# Patient Record
Sex: FEMALE | Race: WHITE | NOT HISPANIC OR LATINO | Employment: OTHER | URBAN - METROPOLITAN AREA
[De-identification: names, ages, dates, MRNs, and addresses within clinical notes are randomized per-mention and may not be internally consistent; named-entity substitution may affect disease eponyms.]

---

## 2016-06-28 LAB
HPV_EXT - HISTORICAL: NORMAL
PAP SMEAR - HIM PATIENT REPORTED: NORMAL

## 2017-02-07 ENCOUNTER — COMMUNICATION - HEALTHEAST (OUTPATIENT)
Dept: FAMILY MEDICINE | Facility: CLINIC | Age: 72
End: 2017-02-07

## 2017-02-07 DIAGNOSIS — F32.A DEPRESSION: ICD-10-CM

## 2017-02-16 ENCOUNTER — RECORDS - HEALTHEAST (OUTPATIENT)
Dept: ADMINISTRATIVE | Facility: OTHER | Age: 72
End: 2017-02-16

## 2017-02-19 ENCOUNTER — COMMUNICATION - HEALTHEAST (OUTPATIENT)
Dept: FAMILY MEDICINE | Facility: CLINIC | Age: 72
End: 2017-02-19

## 2017-02-19 DIAGNOSIS — N94.819 VULVODYNIA: ICD-10-CM

## 2017-04-11 ENCOUNTER — COMMUNICATION - HEALTHEAST (OUTPATIENT)
Dept: FAMILY MEDICINE | Facility: CLINIC | Age: 72
End: 2017-04-11

## 2017-04-11 DIAGNOSIS — N94.819 VULVODYNIA: ICD-10-CM

## 2017-06-05 ENCOUNTER — OFFICE VISIT - HEALTHEAST (OUTPATIENT)
Dept: FAMILY MEDICINE | Facility: CLINIC | Age: 72
End: 2017-06-05

## 2017-06-05 DIAGNOSIS — Z13.9 SCREENING: ICD-10-CM

## 2017-06-05 DIAGNOSIS — N94.819 VULVODYNIA: ICD-10-CM

## 2017-06-05 DIAGNOSIS — Z78.0 POSTMENOPAUSAL: ICD-10-CM

## 2017-06-05 DIAGNOSIS — F32.A DEPRESSION: ICD-10-CM

## 2017-06-05 DIAGNOSIS — G47.00 INSOMNIA: ICD-10-CM

## 2017-06-05 DIAGNOSIS — R53.83 FATIGUE: ICD-10-CM

## 2017-06-21 ENCOUNTER — RECORDS - HEALTHEAST (OUTPATIENT)
Dept: BONE DENSITY | Facility: CLINIC | Age: 72
End: 2017-06-21

## 2017-06-21 ENCOUNTER — RECORDS - HEALTHEAST (OUTPATIENT)
Dept: ADMINISTRATIVE | Facility: OTHER | Age: 72
End: 2017-06-21

## 2017-06-21 ENCOUNTER — HOSPITAL ENCOUNTER (OUTPATIENT)
Dept: MAMMOGRAPHY | Facility: CLINIC | Age: 72
Discharge: HOME | End: 2017-06-21
Attending: FAMILY MEDICINE

## 2017-06-21 DIAGNOSIS — Z78.0 ASYMPTOMATIC MENOPAUSAL STATE: ICD-10-CM

## 2017-06-21 DIAGNOSIS — Z13.9 SCREENING: ICD-10-CM

## 2017-07-19 ENCOUNTER — RECORDS - HEALTHEAST (OUTPATIENT)
Dept: ADMINISTRATIVE | Facility: OTHER | Age: 72
End: 2017-07-19

## 2017-07-19 LAB
HPV_EXT - HISTORICAL: NORMAL
PAP SMEAR - HIM PATIENT REPORTED: NORMAL

## 2017-08-19 ENCOUNTER — COMMUNICATION - HEALTHEAST (OUTPATIENT)
Dept: FAMILY MEDICINE | Facility: CLINIC | Age: 72
End: 2017-08-19

## 2017-08-19 DIAGNOSIS — N94.819 VULVODYNIA: ICD-10-CM

## 2017-09-07 ENCOUNTER — OFFICE VISIT - HEALTHEAST (OUTPATIENT)
Dept: FAMILY MEDICINE | Facility: CLINIC | Age: 72
End: 2017-09-07

## 2017-09-07 DIAGNOSIS — R63.4 WEIGHT LOSS: ICD-10-CM

## 2017-09-07 DIAGNOSIS — R19.7 DIARRHEA: ICD-10-CM

## 2017-09-07 ASSESSMENT — MIFFLIN-ST. JEOR: SCORE: 1049.72

## 2017-09-11 ENCOUNTER — HOSPITAL ENCOUNTER (OUTPATIENT)
Dept: CT IMAGING | Facility: CLINIC | Age: 72
Discharge: HOME | End: 2017-09-11
Attending: FAMILY MEDICINE

## 2017-09-11 ENCOUNTER — AMBULATORY - HEALTHEAST (OUTPATIENT)
Dept: LAB | Facility: CLINIC | Age: 72
End: 2017-09-11

## 2017-09-11 DIAGNOSIS — R19.7 DIARRHEA: ICD-10-CM

## 2017-09-11 DIAGNOSIS — R63.4 WEIGHT LOSS: ICD-10-CM

## 2017-09-11 LAB
GLIADIN IGA SER-ACNC: 0.9 U/ML
GLIADIN IGG SER-ACNC: <0.4 U/ML
IGA SERPL-MCNC: 377 MG/DL (ref 65–400)
TTG IGA SER-ACNC: 0.6 U/ML
TTG IGG SER-ACNC: <0.6 U/ML

## 2017-09-14 ENCOUNTER — AMBULATORY - HEALTHEAST (OUTPATIENT)
Dept: FAMILY MEDICINE | Facility: CLINIC | Age: 72
End: 2017-09-14

## 2017-09-14 DIAGNOSIS — R63.4 WEIGHT LOSS: ICD-10-CM

## 2017-09-14 DIAGNOSIS — R19.7 DIARRHEA: ICD-10-CM

## 2017-10-16 ENCOUNTER — RECORDS - HEALTHEAST (OUTPATIENT)
Dept: ADMINISTRATIVE | Facility: OTHER | Age: 72
End: 2017-10-16

## 2017-10-30 ENCOUNTER — COMMUNICATION - HEALTHEAST (OUTPATIENT)
Dept: FAMILY MEDICINE | Facility: CLINIC | Age: 72
End: 2017-10-30

## 2017-10-30 DIAGNOSIS — N94.819 VULVODYNIA: ICD-10-CM

## 2017-12-01 ENCOUNTER — COMMUNICATION - HEALTHEAST (OUTPATIENT)
Dept: FAMILY MEDICINE | Facility: CLINIC | Age: 72
End: 2017-12-01

## 2017-12-01 DIAGNOSIS — F32.A DEPRESSION: ICD-10-CM

## 2017-12-13 ENCOUNTER — OFFICE VISIT - HEALTHEAST (OUTPATIENT)
Dept: INTERNAL MEDICINE | Facility: CLINIC | Age: 72
End: 2017-12-13

## 2017-12-13 DIAGNOSIS — M75.41 IMPINGEMENT SYNDROME OF RIGHT SHOULDER: ICD-10-CM

## 2017-12-13 DIAGNOSIS — M75.42 IMPINGEMENT SYNDROME OF LEFT SHOULDER: ICD-10-CM

## 2018-01-16 ENCOUNTER — OFFICE VISIT - HEALTHEAST (OUTPATIENT)
Dept: FAMILY MEDICINE | Facility: CLINIC | Age: 73
End: 2018-01-16

## 2018-01-16 DIAGNOSIS — F32.A DEPRESSION: ICD-10-CM

## 2018-01-16 DIAGNOSIS — N94.819 VULVODYNIA: ICD-10-CM

## 2018-03-24 ENCOUNTER — COMMUNICATION - HEALTHEAST (OUTPATIENT)
Dept: FAMILY MEDICINE | Facility: CLINIC | Age: 73
End: 2018-03-24

## 2018-03-24 DIAGNOSIS — N94.819 VULVODYNIA: ICD-10-CM

## 2018-05-10 ENCOUNTER — OFFICE VISIT - HEALTHEAST (OUTPATIENT)
Dept: FAMILY MEDICINE | Facility: CLINIC | Age: 73
End: 2018-05-10

## 2018-05-10 DIAGNOSIS — N94.819 VULVODYNIA: ICD-10-CM

## 2018-05-10 DIAGNOSIS — F32.A DEPRESSION: ICD-10-CM

## 2018-06-21 ENCOUNTER — RECORDS - HEALTHEAST (OUTPATIENT)
Dept: ADMINISTRATIVE | Facility: OTHER | Age: 73
End: 2018-06-21

## 2018-07-19 ENCOUNTER — RECORDS - HEALTHEAST (OUTPATIENT)
Dept: ADMINISTRATIVE | Facility: OTHER | Age: 73
End: 2018-07-19

## 2018-08-07 ENCOUNTER — COMMUNICATION - HEALTHEAST (OUTPATIENT)
Dept: FAMILY MEDICINE | Facility: CLINIC | Age: 73
End: 2018-08-07

## 2018-09-04 ENCOUNTER — OFFICE VISIT - HEALTHEAST (OUTPATIENT)
Dept: FAMILY MEDICINE | Facility: CLINIC | Age: 73
End: 2018-09-04

## 2018-09-04 DIAGNOSIS — Z01.810 PREOP CARDIOVASCULAR EXAM: ICD-10-CM

## 2018-09-04 DIAGNOSIS — M75.101 TEAR OF RIGHT ROTATOR CUFF, UNSPECIFIED TEAR EXTENT: ICD-10-CM

## 2018-09-04 DIAGNOSIS — Z76.89 ENCOUNTER TO ESTABLISH CARE: ICD-10-CM

## 2018-09-04 DIAGNOSIS — N94.819 VULVODYNIA: ICD-10-CM

## 2018-09-04 DIAGNOSIS — F33.41 RECURRENT MAJOR DEPRESSIVE DISORDER, IN PARTIAL REMISSION (H): ICD-10-CM

## 2018-09-04 DIAGNOSIS — L94.0 CIRCUMSCRIBED SCLERODERMA: ICD-10-CM

## 2018-09-04 LAB
ANION GAP SERPL CALCULATED.3IONS-SCNC: 11 MMOL/L (ref 5–18)
ATRIAL RATE - MUSE: 76 BPM
BUN SERPL-MCNC: 15 MG/DL (ref 8–28)
CALCIUM SERPL-MCNC: 9.3 MG/DL (ref 8.5–10.5)
CHLORIDE BLD-SCNC: 104 MMOL/L (ref 98–107)
CO2 SERPL-SCNC: 27 MMOL/L (ref 22–31)
CREAT SERPL-MCNC: 0.79 MG/DL (ref 0.6–1.1)
DIASTOLIC BLOOD PRESSURE - MUSE: NORMAL MMHG
ERYTHROCYTE [DISTWIDTH] IN BLOOD BY AUTOMATED COUNT: 12.5 % (ref 11–14.5)
GFR SERPL CREATININE-BSD FRML MDRD: >60 ML/MIN/1.73M2
GLUCOSE BLD-MCNC: 71 MG/DL (ref 70–125)
HCT VFR BLD AUTO: 44.4 % (ref 35–47)
HGB BLD-MCNC: 15 G/DL (ref 12–16)
INTERPRETATION ECG - MUSE: NORMAL
MCH RBC QN AUTO: 31.9 PG (ref 27–34)
MCHC RBC AUTO-ENTMCNC: 33.7 G/DL (ref 32–36)
MCV RBC AUTO: 95 FL (ref 80–100)
P AXIS - MUSE: 56 DEGREES
PLATELET # BLD AUTO: 245 THOU/UL (ref 140–440)
PMV BLD AUTO: 6.9 FL (ref 7–10)
POTASSIUM BLD-SCNC: 4.3 MMOL/L (ref 3.5–5)
PR INTERVAL - MUSE: 122 MS
QRS DURATION - MUSE: 80 MS
QT - MUSE: 376 MS
QTC - MUSE: 423 MS
R AXIS - MUSE: 44 DEGREES
RBC # BLD AUTO: 4.69 MILL/UL (ref 3.8–5.4)
SODIUM SERPL-SCNC: 142 MMOL/L (ref 136–145)
SYSTOLIC BLOOD PRESSURE - MUSE: NORMAL MMHG
T AXIS - MUSE: 49 DEGREES
VENTRICULAR RATE- MUSE: 76 BPM
WBC: 5.7 THOU/UL (ref 4–11)

## 2018-09-04 ASSESSMENT — MIFFLIN-ST. JEOR: SCORE: 1078.52

## 2018-09-06 ENCOUNTER — COMMUNICATION - HEALTHEAST (OUTPATIENT)
Dept: FAMILY MEDICINE | Facility: CLINIC | Age: 73
End: 2018-09-06

## 2018-09-28 ENCOUNTER — RECORDS - HEALTHEAST (OUTPATIENT)
Dept: ADMINISTRATIVE | Facility: OTHER | Age: 73
End: 2018-09-28
Payer: COMMERCIAL

## 2018-10-15 ENCOUNTER — COMMUNICATION - HEALTHEAST (OUTPATIENT)
Dept: FAMILY MEDICINE | Facility: CLINIC | Age: 73
End: 2018-10-15

## 2018-10-15 DIAGNOSIS — N94.819 VULVODYNIA: ICD-10-CM

## 2018-10-16 ENCOUNTER — RECORDS - HEALTHEAST (OUTPATIENT)
Dept: ADMINISTRATIVE | Facility: OTHER | Age: 73
End: 2018-10-16

## 2018-10-16 ENCOUNTER — AMBULATORY - HEALTHEAST (OUTPATIENT)
Dept: ADMINISTRATIVE | Facility: REHABILITATION | Age: 73
End: 2018-10-16

## 2018-10-16 DIAGNOSIS — Z98.890 STATUS POST ARTHROSCOPY OF SHOULDER: ICD-10-CM

## 2018-11-01 ENCOUNTER — OFFICE VISIT - HEALTHEAST (OUTPATIENT)
Dept: PHYSICAL THERAPY | Facility: REHABILITATION | Age: 73
End: 2018-11-01

## 2018-11-01 ENCOUNTER — COMMUNICATION - HEALTHEAST (OUTPATIENT)
Dept: FAMILY MEDICINE | Facility: CLINIC | Age: 73
End: 2018-11-01

## 2018-11-01 DIAGNOSIS — M25.611 SHOULDER STIFFNESS, RIGHT: ICD-10-CM

## 2018-11-01 DIAGNOSIS — Z98.890 S/P ROTATOR CUFF REPAIR: ICD-10-CM

## 2018-11-01 DIAGNOSIS — G89.18 ACUTE POST-OPERATIVE PAIN: ICD-10-CM

## 2018-11-01 DIAGNOSIS — R53.1 DECREASED STRENGTH: ICD-10-CM

## 2018-11-05 ENCOUNTER — OFFICE VISIT - HEALTHEAST (OUTPATIENT)
Dept: PHYSICAL THERAPY | Facility: REHABILITATION | Age: 73
End: 2018-11-05

## 2018-11-05 DIAGNOSIS — G89.18 ACUTE POST-OPERATIVE PAIN: ICD-10-CM

## 2018-11-05 DIAGNOSIS — R53.1 DECREASED STRENGTH: ICD-10-CM

## 2018-11-05 DIAGNOSIS — M25.611 SHOULDER STIFFNESS, RIGHT: ICD-10-CM

## 2018-11-05 DIAGNOSIS — Z98.890 S/P ROTATOR CUFF REPAIR: ICD-10-CM

## 2018-11-07 ENCOUNTER — AMBULATORY - HEALTHEAST (OUTPATIENT)
Dept: NURSING | Facility: CLINIC | Age: 73
End: 2018-11-07

## 2018-11-07 DIAGNOSIS — Z23 FLU VACCINE NEED: ICD-10-CM

## 2018-11-08 ENCOUNTER — OFFICE VISIT - HEALTHEAST (OUTPATIENT)
Dept: PHYSICAL THERAPY | Facility: REHABILITATION | Age: 73
End: 2018-11-08

## 2018-11-08 DIAGNOSIS — R53.1 DECREASED STRENGTH: ICD-10-CM

## 2018-11-08 DIAGNOSIS — M25.611 SHOULDER STIFFNESS, RIGHT: ICD-10-CM

## 2018-11-08 DIAGNOSIS — Z98.890 S/P ROTATOR CUFF REPAIR: ICD-10-CM

## 2018-11-08 DIAGNOSIS — G89.18 ACUTE POST-OPERATIVE PAIN: ICD-10-CM

## 2018-11-12 ENCOUNTER — OFFICE VISIT - HEALTHEAST (OUTPATIENT)
Dept: PHYSICAL THERAPY | Facility: REHABILITATION | Age: 73
End: 2018-11-12

## 2018-11-12 DIAGNOSIS — R53.1 DECREASED STRENGTH: ICD-10-CM

## 2018-11-12 DIAGNOSIS — Z98.890 S/P ROTATOR CUFF REPAIR: ICD-10-CM

## 2018-11-12 DIAGNOSIS — M25.611 SHOULDER STIFFNESS, RIGHT: ICD-10-CM

## 2018-11-12 DIAGNOSIS — G89.18 ACUTE POST-OPERATIVE PAIN: ICD-10-CM

## 2018-11-13 ENCOUNTER — RECORDS - HEALTHEAST (OUTPATIENT)
Dept: ADMINISTRATIVE | Facility: OTHER | Age: 73
End: 2018-11-13

## 2018-11-16 ENCOUNTER — OFFICE VISIT - HEALTHEAST (OUTPATIENT)
Dept: PHYSICAL THERAPY | Facility: REHABILITATION | Age: 73
End: 2018-11-16

## 2018-11-16 DIAGNOSIS — R53.1 DECREASED STRENGTH: ICD-10-CM

## 2018-11-16 DIAGNOSIS — Z98.890 S/P ROTATOR CUFF REPAIR: ICD-10-CM

## 2018-11-16 DIAGNOSIS — G89.18 ACUTE POST-OPERATIVE PAIN: ICD-10-CM

## 2018-11-16 DIAGNOSIS — M25.611 SHOULDER STIFFNESS, RIGHT: ICD-10-CM

## 2018-11-19 ENCOUNTER — OFFICE VISIT - HEALTHEAST (OUTPATIENT)
Dept: PHYSICAL THERAPY | Facility: REHABILITATION | Age: 73
End: 2018-11-19

## 2018-11-19 DIAGNOSIS — R53.1 DECREASED STRENGTH: ICD-10-CM

## 2018-11-19 DIAGNOSIS — M25.611 SHOULDER STIFFNESS, RIGHT: ICD-10-CM

## 2018-11-19 DIAGNOSIS — G89.18 ACUTE POST-OPERATIVE PAIN: ICD-10-CM

## 2018-11-19 DIAGNOSIS — Z98.890 S/P ROTATOR CUFF REPAIR: ICD-10-CM

## 2018-11-23 ENCOUNTER — OFFICE VISIT - HEALTHEAST (OUTPATIENT)
Dept: PHYSICAL THERAPY | Facility: REHABILITATION | Age: 73
End: 2018-11-23

## 2018-11-23 DIAGNOSIS — M25.611 SHOULDER STIFFNESS, RIGHT: ICD-10-CM

## 2018-11-23 DIAGNOSIS — Z98.890 S/P ROTATOR CUFF REPAIR: ICD-10-CM

## 2018-11-23 DIAGNOSIS — G89.18 ACUTE POST-OPERATIVE PAIN: ICD-10-CM

## 2018-11-23 DIAGNOSIS — R53.1 DECREASED STRENGTH: ICD-10-CM

## 2018-11-26 ENCOUNTER — OFFICE VISIT - HEALTHEAST (OUTPATIENT)
Dept: PHYSICAL THERAPY | Facility: REHABILITATION | Age: 73
End: 2018-11-26

## 2018-11-26 ENCOUNTER — OFFICE VISIT - HEALTHEAST (OUTPATIENT)
Dept: FAMILY MEDICINE | Facility: CLINIC | Age: 73
End: 2018-11-26

## 2018-11-26 DIAGNOSIS — R53.1 DECREASED STRENGTH: ICD-10-CM

## 2018-11-26 DIAGNOSIS — M25.611 SHOULDER STIFFNESS, RIGHT: ICD-10-CM

## 2018-11-26 DIAGNOSIS — F11.90 CHRONIC, CONTINUOUS USE OF OPIOIDS: ICD-10-CM

## 2018-11-26 DIAGNOSIS — N94.819 VULVODYNIA: ICD-10-CM

## 2018-11-26 DIAGNOSIS — Z76.89 ENCOUNTER TO ESTABLISH CARE: ICD-10-CM

## 2018-11-26 DIAGNOSIS — Z98.890 S/P ROTATOR CUFF REPAIR: ICD-10-CM

## 2018-11-26 DIAGNOSIS — G89.18 ACUTE POST-OPERATIVE PAIN: ICD-10-CM

## 2018-11-26 ASSESSMENT — MIFFLIN-ST. JEOR: SCORE: 1076.94

## 2018-11-28 ENCOUNTER — COMMUNICATION - HEALTHEAST (OUTPATIENT)
Dept: FAMILY MEDICINE | Facility: CLINIC | Age: 73
End: 2018-11-28

## 2018-11-28 DIAGNOSIS — N94.819 VULVODYNIA: ICD-10-CM

## 2018-11-29 ENCOUNTER — OFFICE VISIT - HEALTHEAST (OUTPATIENT)
Dept: PHYSICAL THERAPY | Facility: REHABILITATION | Age: 73
End: 2018-11-29

## 2018-11-29 DIAGNOSIS — R53.1 DECREASED STRENGTH: ICD-10-CM

## 2018-11-29 DIAGNOSIS — G89.18 ACUTE POST-OPERATIVE PAIN: ICD-10-CM

## 2018-11-29 DIAGNOSIS — M25.611 SHOULDER STIFFNESS, RIGHT: ICD-10-CM

## 2018-11-29 DIAGNOSIS — Z98.890 S/P ROTATOR CUFF REPAIR: ICD-10-CM

## 2018-12-03 ENCOUNTER — OFFICE VISIT - HEALTHEAST (OUTPATIENT)
Dept: PHYSICAL THERAPY | Facility: REHABILITATION | Age: 73
End: 2018-12-03

## 2018-12-03 DIAGNOSIS — M25.611 SHOULDER STIFFNESS, RIGHT: ICD-10-CM

## 2018-12-03 DIAGNOSIS — Z98.890 S/P ROTATOR CUFF REPAIR: ICD-10-CM

## 2018-12-03 DIAGNOSIS — G89.18 ACUTE POST-OPERATIVE PAIN: ICD-10-CM

## 2018-12-03 DIAGNOSIS — R53.1 DECREASED STRENGTH: ICD-10-CM

## 2018-12-07 ENCOUNTER — OFFICE VISIT - HEALTHEAST (OUTPATIENT)
Dept: PHYSICAL THERAPY | Facility: REHABILITATION | Age: 73
End: 2018-12-07

## 2018-12-07 DIAGNOSIS — M25.611 SHOULDER STIFFNESS, RIGHT: ICD-10-CM

## 2018-12-07 DIAGNOSIS — R53.1 DECREASED STRENGTH: ICD-10-CM

## 2018-12-07 DIAGNOSIS — G89.18 ACUTE POST-OPERATIVE PAIN: ICD-10-CM

## 2018-12-07 DIAGNOSIS — Z98.890 S/P ROTATOR CUFF REPAIR: ICD-10-CM

## 2018-12-10 ENCOUNTER — OFFICE VISIT - HEALTHEAST (OUTPATIENT)
Dept: PHYSICAL THERAPY | Facility: REHABILITATION | Age: 73
End: 2018-12-10

## 2018-12-10 DIAGNOSIS — M25.611 SHOULDER STIFFNESS, RIGHT: ICD-10-CM

## 2018-12-10 DIAGNOSIS — Z98.890 S/P ROTATOR CUFF REPAIR: ICD-10-CM

## 2018-12-10 DIAGNOSIS — G89.18 ACUTE POST-OPERATIVE PAIN: ICD-10-CM

## 2018-12-14 ENCOUNTER — OFFICE VISIT - HEALTHEAST (OUTPATIENT)
Dept: PHYSICAL THERAPY | Facility: REHABILITATION | Age: 73
End: 2018-12-14

## 2018-12-14 DIAGNOSIS — G89.18 ACUTE POST-OPERATIVE PAIN: ICD-10-CM

## 2018-12-14 DIAGNOSIS — Z98.890 S/P ROTATOR CUFF REPAIR: ICD-10-CM

## 2018-12-14 DIAGNOSIS — M25.611 SHOULDER STIFFNESS, RIGHT: ICD-10-CM

## 2018-12-14 DIAGNOSIS — R53.1 DECREASED STRENGTH: ICD-10-CM

## 2018-12-18 ENCOUNTER — OFFICE VISIT - HEALTHEAST (OUTPATIENT)
Dept: PHYSICAL THERAPY | Facility: REHABILITATION | Age: 73
End: 2018-12-18

## 2018-12-18 ENCOUNTER — RECORDS - HEALTHEAST (OUTPATIENT)
Dept: ADMINISTRATIVE | Facility: OTHER | Age: 73
End: 2018-12-18

## 2018-12-18 DIAGNOSIS — Z98.890 S/P ROTATOR CUFF REPAIR: ICD-10-CM

## 2018-12-18 DIAGNOSIS — G89.18 ACUTE POST-OPERATIVE PAIN: ICD-10-CM

## 2018-12-18 DIAGNOSIS — M25.611 SHOULDER STIFFNESS, RIGHT: ICD-10-CM

## 2018-12-18 DIAGNOSIS — R53.1 DECREASED STRENGTH: ICD-10-CM

## 2018-12-21 ENCOUNTER — OFFICE VISIT - HEALTHEAST (OUTPATIENT)
Dept: PHYSICAL THERAPY | Facility: REHABILITATION | Age: 73
End: 2018-12-21

## 2018-12-21 DIAGNOSIS — Z98.890 S/P ROTATOR CUFF REPAIR: ICD-10-CM

## 2018-12-21 DIAGNOSIS — M25.611 SHOULDER STIFFNESS, RIGHT: ICD-10-CM

## 2018-12-21 DIAGNOSIS — G89.18 ACUTE POST-OPERATIVE PAIN: ICD-10-CM

## 2018-12-21 DIAGNOSIS — R53.1 DECREASED STRENGTH: ICD-10-CM

## 2018-12-28 ENCOUNTER — OFFICE VISIT - HEALTHEAST (OUTPATIENT)
Dept: PHYSICAL THERAPY | Facility: REHABILITATION | Age: 73
End: 2018-12-28

## 2018-12-28 DIAGNOSIS — R53.1 DECREASED STRENGTH: ICD-10-CM

## 2018-12-28 DIAGNOSIS — G89.18 ACUTE POST-OPERATIVE PAIN: ICD-10-CM

## 2018-12-28 DIAGNOSIS — M25.611 SHOULDER STIFFNESS, RIGHT: ICD-10-CM

## 2018-12-28 DIAGNOSIS — Z98.890 S/P ROTATOR CUFF REPAIR: ICD-10-CM

## 2019-02-09 ENCOUNTER — COMMUNICATION - HEALTHEAST (OUTPATIENT)
Dept: FAMILY MEDICINE | Facility: CLINIC | Age: 74
End: 2019-02-09

## 2019-02-09 DIAGNOSIS — F33.41 RECURRENT MAJOR DEPRESSIVE DISORDER, IN PARTIAL REMISSION (H): ICD-10-CM

## 2019-03-04 ENCOUNTER — COMMUNICATION - HEALTHEAST (OUTPATIENT)
Dept: FAMILY MEDICINE | Facility: CLINIC | Age: 74
End: 2019-03-04

## 2019-03-04 DIAGNOSIS — F32.A DEPRESSION: ICD-10-CM

## 2019-03-27 ENCOUNTER — OFFICE VISIT - HEALTHEAST (OUTPATIENT)
Dept: FAMILY MEDICINE | Facility: CLINIC | Age: 74
End: 2019-03-27

## 2019-03-27 ENCOUNTER — AMBULATORY - HEALTHEAST (OUTPATIENT)
Dept: FAMILY MEDICINE | Facility: CLINIC | Age: 74
End: 2019-03-27

## 2019-03-27 DIAGNOSIS — Z12.31 VISIT FOR SCREENING MAMMOGRAM: ICD-10-CM

## 2019-03-27 DIAGNOSIS — F11.90 CHRONIC, CONTINUOUS USE OF OPIOIDS: ICD-10-CM

## 2019-03-27 DIAGNOSIS — F40.243 FEAR OF FLYING: ICD-10-CM

## 2019-03-27 DIAGNOSIS — F33.42 RECURRENT MAJOR DEPRESSIVE DISORDER, IN FULL REMISSION (H): ICD-10-CM

## 2019-03-27 DIAGNOSIS — N94.819 VULVODYNIA: ICD-10-CM

## 2019-03-27 LAB
AMPHETAMINES UR QL SCN: NORMAL
BARBITURATES UR QL: NORMAL
BENZODIAZ UR QL: NORMAL
CANNABINOIDS UR QL SCN: NORMAL
COCAINE UR QL: NORMAL
CREAT UR-MCNC: 138 MG/DL
OPIATES UR QL SCN: NORMAL
OXYCODONE UR QL: NORMAL
PCP UR QL SCN: NORMAL

## 2019-03-29 LAB
O-NORTRAMADOL UR CFM-MCNC: NORMAL NG/ML
TRAMADOL UR-MCNC: NORMAL NG/ML

## 2019-04-11 ENCOUNTER — RECORDS - HEALTHEAST (OUTPATIENT)
Dept: HEALTH INFORMATION MANAGEMENT | Facility: CLINIC | Age: 74
End: 2019-04-11

## 2019-05-14 ENCOUNTER — HOSPITAL ENCOUNTER (OUTPATIENT)
Dept: MAMMOGRAPHY | Facility: CLINIC | Age: 74
Discharge: HOME | End: 2019-05-14
Attending: FAMILY MEDICINE

## 2019-05-14 DIAGNOSIS — Z12.31 VISIT FOR SCREENING MAMMOGRAM: ICD-10-CM

## 2019-08-26 ENCOUNTER — COMMUNICATION - HEALTHEAST (OUTPATIENT)
Dept: FAMILY MEDICINE | Facility: CLINIC | Age: 74
End: 2019-08-26

## 2019-08-26 DIAGNOSIS — F11.90 CHRONIC, CONTINUOUS USE OF OPIOIDS: ICD-10-CM

## 2019-08-26 DIAGNOSIS — N94.819 VULVODYNIA: ICD-10-CM

## 2019-08-30 ENCOUNTER — OFFICE VISIT - HEALTHEAST (OUTPATIENT)
Dept: FAMILY MEDICINE | Facility: CLINIC | Age: 74
End: 2019-08-30

## 2019-08-30 DIAGNOSIS — F11.90 CHRONIC, CONTINUOUS USE OF OPIOIDS: ICD-10-CM

## 2019-08-30 DIAGNOSIS — L94.0 CIRCUMSCRIBED SCLERODERMA: ICD-10-CM

## 2019-08-30 DIAGNOSIS — R53.82 CHRONIC FATIGUE: ICD-10-CM

## 2019-08-30 DIAGNOSIS — F40.243 FEAR OF FLYING: ICD-10-CM

## 2019-08-30 DIAGNOSIS — G47.00 INSOMNIA, UNSPECIFIED TYPE: ICD-10-CM

## 2019-08-30 DIAGNOSIS — F33.42 RECURRENT MAJOR DEPRESSIVE DISORDER, IN FULL REMISSION (H): ICD-10-CM

## 2019-08-30 DIAGNOSIS — N94.819 VULVODYNIA: ICD-10-CM

## 2019-08-30 LAB
ANION GAP SERPL CALCULATED.3IONS-SCNC: 9 MMOL/L (ref 5–18)
BUN SERPL-MCNC: 14 MG/DL (ref 8–28)
CALCIUM SERPL-MCNC: 9.5 MG/DL (ref 8.5–10.5)
CHLORIDE BLD-SCNC: 104 MMOL/L (ref 98–107)
CO2 SERPL-SCNC: 29 MMOL/L (ref 22–31)
CREAT SERPL-MCNC: 0.86 MG/DL (ref 0.6–1.1)
ERYTHROCYTE [DISTWIDTH] IN BLOOD BY AUTOMATED COUNT: 12.2 % (ref 11–14.5)
GFR SERPL CREATININE-BSD FRML MDRD: >60 ML/MIN/1.73M2
GLUCOSE BLD-MCNC: 84 MG/DL (ref 70–125)
HCT VFR BLD AUTO: 44.8 % (ref 35–47)
HGB BLD-MCNC: 15.4 G/DL (ref 12–16)
MCH RBC QN AUTO: 32 PG (ref 27–34)
MCHC RBC AUTO-ENTMCNC: 34.3 G/DL (ref 32–36)
MCV RBC AUTO: 93 FL (ref 80–100)
PLATELET # BLD AUTO: 255 THOU/UL (ref 140–440)
PMV BLD AUTO: 6.8 FL (ref 7–10)
POTASSIUM BLD-SCNC: 4.3 MMOL/L (ref 3.5–5)
RBC # BLD AUTO: 4.81 MILL/UL (ref 3.8–5.4)
SODIUM SERPL-SCNC: 142 MMOL/L (ref 136–145)
TSH SERPL DL<=0.005 MIU/L-ACNC: 1.54 UIU/ML (ref 0.3–5)
WBC: 5.6 THOU/UL (ref 4–11)

## 2019-10-10 ENCOUNTER — COMMUNICATION - HEALTHEAST (OUTPATIENT)
Dept: FAMILY MEDICINE | Facility: CLINIC | Age: 74
End: 2019-10-10

## 2019-10-10 DIAGNOSIS — F43.21 GRIEF REACTION: ICD-10-CM

## 2019-11-04 ENCOUNTER — COMMUNICATION - HEALTHEAST (OUTPATIENT)
Dept: FAMILY MEDICINE | Facility: CLINIC | Age: 74
End: 2019-11-04

## 2019-11-04 DIAGNOSIS — F33.41 RECURRENT MAJOR DEPRESSIVE DISORDER, IN PARTIAL REMISSION (H): ICD-10-CM

## 2019-11-06 ENCOUNTER — OFFICE VISIT - HEALTHEAST (OUTPATIENT)
Dept: FAMILY MEDICINE | Facility: CLINIC | Age: 74
End: 2019-11-06

## 2019-11-06 DIAGNOSIS — F43.21 GRIEF REACTION: ICD-10-CM

## 2019-11-06 DIAGNOSIS — F43.21 GRIEF: ICD-10-CM

## 2019-11-06 DIAGNOSIS — F40.243 FEAR OF FLYING: ICD-10-CM

## 2019-11-06 DIAGNOSIS — Z23 NEED FOR VACCINATION: ICD-10-CM

## 2019-11-06 ASSESSMENT — MIFFLIN-ST. JEOR: SCORE: 1083.74

## 2019-11-06 ASSESSMENT — ANXIETY QUESTIONNAIRES
7. FEELING AFRAID AS IF SOMETHING AWFUL MIGHT HAPPEN: SEVERAL DAYS
GAD7 TOTAL SCORE: 18
6. BECOMING EASILY ANNOYED OR IRRITABLE: MORE THAN HALF THE DAYS
3. WORRYING TOO MUCH ABOUT DIFFERENT THINGS: NEARLY EVERY DAY
1. FEELING NERVOUS, ANXIOUS, OR ON EDGE: NEARLY EVERY DAY
2. NOT BEING ABLE TO STOP OR CONTROL WORRYING: NEARLY EVERY DAY
5. BEING SO RESTLESS THAT IT IS HARD TO SIT STILL: NEARLY EVERY DAY
4. TROUBLE RELAXING: NEARLY EVERY DAY
IF YOU CHECKED OFF ANY PROBLEMS ON THIS QUESTIONNAIRE, HOW DIFFICULT HAVE THESE PROBLEMS MADE IT FOR YOU TO DO YOUR WORK, TAKE CARE OF THINGS AT HOME, OR GET ALONG WITH OTHER PEOPLE: VERY DIFFICULT

## 2019-11-06 ASSESSMENT — PATIENT HEALTH QUESTIONNAIRE - PHQ9: SUM OF ALL RESPONSES TO PHQ QUESTIONS 1-9: 19

## 2019-11-25 ENCOUNTER — OFFICE VISIT - HEALTHEAST (OUTPATIENT)
Dept: FAMILY MEDICINE | Facility: CLINIC | Age: 74
End: 2019-11-25

## 2019-11-25 DIAGNOSIS — Z79.899 CONTROLLED SUBSTANCE AGREEMENT SIGNED: ICD-10-CM

## 2019-11-25 DIAGNOSIS — N94.819 VULVODYNIA: ICD-10-CM

## 2019-11-25 DIAGNOSIS — F32.A DEPRESSION: ICD-10-CM

## 2019-11-25 DIAGNOSIS — F11.90 CHRONIC, CONTINUOUS USE OF OPIOIDS: ICD-10-CM

## 2019-11-25 DIAGNOSIS — F33.41 RECURRENT MAJOR DEPRESSIVE DISORDER, IN PARTIAL REMISSION (H): ICD-10-CM

## 2019-11-25 DIAGNOSIS — F43.21 GRIEF REACTION: ICD-10-CM

## 2019-11-25 ASSESSMENT — ANXIETY QUESTIONNAIRES
6. BECOMING EASILY ANNOYED OR IRRITABLE: MORE THAN HALF THE DAYS
2. NOT BEING ABLE TO STOP OR CONTROL WORRYING: NEARLY EVERY DAY
GAD7 TOTAL SCORE: 18
7. FEELING AFRAID AS IF SOMETHING AWFUL MIGHT HAPPEN: MORE THAN HALF THE DAYS
3. WORRYING TOO MUCH ABOUT DIFFERENT THINGS: NEARLY EVERY DAY
1. FEELING NERVOUS, ANXIOUS, OR ON EDGE: NEARLY EVERY DAY
5. BEING SO RESTLESS THAT IT IS HARD TO SIT STILL: MORE THAN HALF THE DAYS
IF YOU CHECKED OFF ANY PROBLEMS ON THIS QUESTIONNAIRE, HOW DIFFICULT HAVE THESE PROBLEMS MADE IT FOR YOU TO DO YOUR WORK, TAKE CARE OF THINGS AT HOME, OR GET ALONG WITH OTHER PEOPLE: VERY DIFFICULT
4. TROUBLE RELAXING: NEARLY EVERY DAY

## 2019-11-25 ASSESSMENT — PATIENT HEALTH QUESTIONNAIRE - PHQ9: SUM OF ALL RESPONSES TO PHQ QUESTIONS 1-9: 17

## 2019-11-27 ENCOUNTER — COMMUNICATION - HEALTHEAST (OUTPATIENT)
Dept: FAMILY MEDICINE | Facility: CLINIC | Age: 74
End: 2019-11-27

## 2019-11-27 DIAGNOSIS — F32.A DEPRESSION: ICD-10-CM

## 2020-01-06 ENCOUNTER — OFFICE VISIT - HEALTHEAST (OUTPATIENT)
Dept: FAMILY MEDICINE | Facility: CLINIC | Age: 75
End: 2020-01-06

## 2020-01-06 DIAGNOSIS — F11.90 CHRONIC, CONTINUOUS USE OF OPIOIDS: ICD-10-CM

## 2020-01-06 DIAGNOSIS — N94.819 VULVODYNIA: ICD-10-CM

## 2020-01-06 DIAGNOSIS — F33.41 RECURRENT MAJOR DEPRESSIVE DISORDER, IN PARTIAL REMISSION (H): ICD-10-CM

## 2020-01-06 ASSESSMENT — PATIENT HEALTH QUESTIONNAIRE - PHQ9: SUM OF ALL RESPONSES TO PHQ QUESTIONS 1-9: 10

## 2020-01-06 ASSESSMENT — ANXIETY QUESTIONNAIRES
2. NOT BEING ABLE TO STOP OR CONTROL WORRYING: SEVERAL DAYS
4. TROUBLE RELAXING: SEVERAL DAYS
1. FEELING NERVOUS, ANXIOUS, OR ON EDGE: SEVERAL DAYS
6. BECOMING EASILY ANNOYED OR IRRITABLE: NOT AT ALL
GAD7 TOTAL SCORE: 5
IF YOU CHECKED OFF ANY PROBLEMS ON THIS QUESTIONNAIRE, HOW DIFFICULT HAVE THESE PROBLEMS MADE IT FOR YOU TO DO YOUR WORK, TAKE CARE OF THINGS AT HOME, OR GET ALONG WITH OTHER PEOPLE: SOMEWHAT DIFFICULT
3. WORRYING TOO MUCH ABOUT DIFFERENT THINGS: SEVERAL DAYS
5. BEING SO RESTLESS THAT IT IS HARD TO SIT STILL: NOT AT ALL
7. FEELING AFRAID AS IF SOMETHING AWFUL MIGHT HAPPEN: SEVERAL DAYS

## 2020-04-06 ENCOUNTER — OFFICE VISIT - HEALTHEAST (OUTPATIENT)
Dept: FAMILY MEDICINE | Facility: CLINIC | Age: 75
End: 2020-04-06

## 2020-04-06 DIAGNOSIS — F11.90 CHRONIC, CONTINUOUS USE OF OPIOIDS: ICD-10-CM

## 2020-04-06 DIAGNOSIS — F33.42 RECURRENT MAJOR DEPRESSIVE DISORDER, IN FULL REMISSION (H): ICD-10-CM

## 2020-04-06 DIAGNOSIS — N94.819 VULVODYNIA: ICD-10-CM

## 2020-04-06 ASSESSMENT — ANXIETY QUESTIONNAIRES
7. FEELING AFRAID AS IF SOMETHING AWFUL MIGHT HAPPEN: SEVERAL DAYS
1. FEELING NERVOUS, ANXIOUS, OR ON EDGE: NOT AT ALL
4. TROUBLE RELAXING: NOT AT ALL
6. BECOMING EASILY ANNOYED OR IRRITABLE: SEVERAL DAYS
GAD7 TOTAL SCORE: 4
IF YOU CHECKED OFF ANY PROBLEMS ON THIS QUESTIONNAIRE, HOW DIFFICULT HAVE THESE PROBLEMS MADE IT FOR YOU TO DO YOUR WORK, TAKE CARE OF THINGS AT HOME, OR GET ALONG WITH OTHER PEOPLE: NOT DIFFICULT AT ALL
3. WORRYING TOO MUCH ABOUT DIFFERENT THINGS: SEVERAL DAYS
2. NOT BEING ABLE TO STOP OR CONTROL WORRYING: SEVERAL DAYS
5. BEING SO RESTLESS THAT IT IS HARD TO SIT STILL: NOT AT ALL

## 2020-04-06 ASSESSMENT — PATIENT HEALTH QUESTIONNAIRE - PHQ9: SUM OF ALL RESPONSES TO PHQ QUESTIONS 1-9: 6

## 2020-08-12 ENCOUNTER — COMMUNICATION - HEALTHEAST (OUTPATIENT)
Dept: FAMILY MEDICINE | Facility: CLINIC | Age: 75
End: 2020-08-12

## 2020-08-12 DIAGNOSIS — N94.819 VULVODYNIA: ICD-10-CM

## 2020-08-12 DIAGNOSIS — F11.90 CHRONIC, CONTINUOUS USE OF OPIOIDS: ICD-10-CM

## 2020-08-20 ENCOUNTER — COMMUNICATION - HEALTHEAST (OUTPATIENT)
Dept: FAMILY MEDICINE | Facility: CLINIC | Age: 75
End: 2020-08-20

## 2020-08-20 DIAGNOSIS — F32.A DEPRESSION: ICD-10-CM

## 2020-09-16 ENCOUNTER — COMMUNICATION - HEALTHEAST (OUTPATIENT)
Dept: FAMILY MEDICINE | Facility: CLINIC | Age: 75
End: 2020-09-16

## 2020-09-16 DIAGNOSIS — N94.819 VULVODYNIA: ICD-10-CM

## 2020-09-16 DIAGNOSIS — F11.90 CHRONIC, CONTINUOUS USE OF OPIOIDS: ICD-10-CM

## 2020-10-20 ENCOUNTER — COMMUNICATION - HEALTHEAST (OUTPATIENT)
Dept: FAMILY MEDICINE | Facility: CLINIC | Age: 75
End: 2020-10-20

## 2020-10-20 DIAGNOSIS — N94.819 VULVODYNIA: ICD-10-CM

## 2020-10-20 DIAGNOSIS — F11.90 CHRONIC, CONTINUOUS USE OF OPIOIDS: ICD-10-CM

## 2020-11-20 ENCOUNTER — OFFICE VISIT - HEALTHEAST (OUTPATIENT)
Dept: FAMILY MEDICINE | Facility: CLINIC | Age: 75
End: 2020-11-20

## 2020-11-20 DIAGNOSIS — N94.819 VULVODYNIA: ICD-10-CM

## 2020-11-20 DIAGNOSIS — L85.3 XEROSIS CUTIS: ICD-10-CM

## 2020-11-20 DIAGNOSIS — J45.20 MILD INTERMITTENT ASTHMA WITHOUT COMPLICATION: ICD-10-CM

## 2020-11-20 DIAGNOSIS — F11.90 CHRONIC, CONTINUOUS USE OF OPIOIDS: ICD-10-CM

## 2020-11-20 LAB
ALBUMIN SERPL-MCNC: 4.1 G/DL (ref 3.5–5)
ALP SERPL-CCNC: 68 U/L (ref 45–120)
ALT SERPL W P-5'-P-CCNC: 17 U/L (ref 0–45)
AMPHETAMINES UR QL SCN: NORMAL
ANION GAP SERPL CALCULATED.3IONS-SCNC: 9 MMOL/L (ref 5–18)
AST SERPL W P-5'-P-CCNC: 18 U/L (ref 0–40)
BARBITURATES UR QL: NORMAL
BENZODIAZ UR QL: NORMAL
BILIRUB SERPL-MCNC: 0.3 MG/DL (ref 0–1)
BUN SERPL-MCNC: 14 MG/DL (ref 8–28)
CALCIUM SERPL-MCNC: 9.8 MG/DL (ref 8.5–10.5)
CANNABINOIDS UR QL SCN: NORMAL
CHLORIDE BLD-SCNC: 101 MMOL/L (ref 98–107)
CO2 SERPL-SCNC: 29 MMOL/L (ref 22–31)
COCAINE UR QL: NORMAL
CREAT SERPL-MCNC: 0.87 MG/DL (ref 0.6–1.1)
CREAT UR-MCNC: 133.1 MG/DL
ERYTHROCYTE [DISTWIDTH] IN BLOOD BY AUTOMATED COUNT: 12.1 % (ref 11–14.5)
GFR SERPL CREATININE-BSD FRML MDRD: >60 ML/MIN/1.73M2
GLUCOSE BLD-MCNC: 84 MG/DL (ref 70–125)
HCT VFR BLD AUTO: 44.8 % (ref 35–47)
HGB BLD-MCNC: 15 G/DL (ref 12–16)
MCH RBC QN AUTO: 32.9 PG (ref 27–34)
MCHC RBC AUTO-ENTMCNC: 33.6 G/DL (ref 32–36)
MCV RBC AUTO: 98 FL (ref 80–100)
OPIATES UR QL SCN: NORMAL
OXYCODONE UR QL: NORMAL
PCP UR QL SCN: NORMAL
PLATELET # BLD AUTO: 241 THOU/UL (ref 140–440)
PMV BLD AUTO: 7.2 FL (ref 7–10)
POTASSIUM BLD-SCNC: 5 MMOL/L (ref 3.5–5)
PROT SERPL-MCNC: 6.8 G/DL (ref 6–8)
RBC # BLD AUTO: 4.57 MILL/UL (ref 3.8–5.4)
SODIUM SERPL-SCNC: 139 MMOL/L (ref 136–145)
WBC: 6.1 THOU/UL (ref 4–11)

## 2020-11-20 ASSESSMENT — ANXIETY QUESTIONNAIRES
6. BECOMING EASILY ANNOYED OR IRRITABLE: NOT AT ALL
3. WORRYING TOO MUCH ABOUT DIFFERENT THINGS: NOT AT ALL
4. TROUBLE RELAXING: NOT AT ALL
2. NOT BEING ABLE TO STOP OR CONTROL WORRYING: NOT AT ALL
1. FEELING NERVOUS, ANXIOUS, OR ON EDGE: SEVERAL DAYS
7. FEELING AFRAID AS IF SOMETHING AWFUL MIGHT HAPPEN: NOT AT ALL
5. BEING SO RESTLESS THAT IT IS HARD TO SIT STILL: NOT AT ALL
GAD7 TOTAL SCORE: 1
IF YOU CHECKED OFF ANY PROBLEMS ON THIS QUESTIONNAIRE, HOW DIFFICULT HAVE THESE PROBLEMS MADE IT FOR YOU TO DO YOUR WORK, TAKE CARE OF THINGS AT HOME, OR GET ALONG WITH OTHER PEOPLE: NOT DIFFICULT AT ALL

## 2020-11-20 ASSESSMENT — MIFFLIN-ST. JEOR: SCORE: 1081.02

## 2020-11-20 ASSESSMENT — PATIENT HEALTH QUESTIONNAIRE - PHQ9: SUM OF ALL RESPONSES TO PHQ QUESTIONS 1-9: 3

## 2020-11-22 LAB
O-NORTRAMADOL UR CFM-MCNC: 9219 NG/ML
TRAMADOL UR-MCNC: NORMAL NG/ML
TSH SERPL DL<=0.005 MIU/L-ACNC: 2.34 UIU/ML (ref 0.3–5)

## 2020-12-15 ENCOUNTER — COMMUNICATION - HEALTHEAST (OUTPATIENT)
Dept: FAMILY MEDICINE | Facility: CLINIC | Age: 75
End: 2020-12-15

## 2020-12-15 DIAGNOSIS — J45.20 MILD INTERMITTENT ASTHMA WITHOUT COMPLICATION: ICD-10-CM

## 2020-12-15 RX ORDER — ALBUTEROL SULFATE 90 UG/1
2 AEROSOL, METERED RESPIRATORY (INHALATION) EVERY 6 HOURS PRN
Qty: 1 EACH | Refills: 0 | Status: SHIPPED | OUTPATIENT
Start: 2020-12-15 | End: 2024-03-27

## 2020-12-23 ENCOUNTER — COMMUNICATION - HEALTHEAST (OUTPATIENT)
Dept: FAMILY MEDICINE | Facility: CLINIC | Age: 75
End: 2020-12-23

## 2020-12-23 DIAGNOSIS — F11.90 CHRONIC, CONTINUOUS USE OF OPIOIDS: ICD-10-CM

## 2020-12-23 DIAGNOSIS — N94.819 VULVODYNIA: ICD-10-CM

## 2021-01-24 ENCOUNTER — COMMUNICATION - HEALTHEAST (OUTPATIENT)
Dept: FAMILY MEDICINE | Facility: CLINIC | Age: 76
End: 2021-01-24

## 2021-01-28 ENCOUNTER — RECORDS - HEALTHEAST (OUTPATIENT)
Dept: FAMILY MEDICINE | Facility: CLINIC | Age: 76
End: 2021-01-28

## 2021-02-15 ENCOUNTER — COMMUNICATION - HEALTHEAST (OUTPATIENT)
Dept: FAMILY MEDICINE | Facility: CLINIC | Age: 76
End: 2021-02-15

## 2021-02-18 ENCOUNTER — COMMUNICATION - HEALTHEAST (OUTPATIENT)
Dept: FAMILY MEDICINE | Facility: CLINIC | Age: 76
End: 2021-02-18

## 2021-02-18 DIAGNOSIS — F33.41 RECURRENT MAJOR DEPRESSIVE DISORDER, IN PARTIAL REMISSION (H): ICD-10-CM

## 2021-02-18 RX ORDER — SERTRALINE HYDROCHLORIDE 100 MG/1
100 TABLET, FILM COATED ORAL DAILY
Qty: 90 TABLET | Refills: 3 | Status: SHIPPED | OUTPATIENT
Start: 2021-02-18 | End: 2022-03-08

## 2021-03-02 ENCOUNTER — COMMUNICATION - HEALTHEAST (OUTPATIENT)
Dept: SCHEDULING | Facility: CLINIC | Age: 76
End: 2021-03-02

## 2021-03-02 DIAGNOSIS — F11.90 CHRONIC, CONTINUOUS USE OF OPIOIDS: ICD-10-CM

## 2021-03-02 DIAGNOSIS — N94.819 VULVODYNIA: ICD-10-CM

## 2021-03-05 ENCOUNTER — AMBULATORY - HEALTHEAST (OUTPATIENT)
Dept: NURSING | Facility: CLINIC | Age: 76
End: 2021-03-05

## 2021-03-26 ENCOUNTER — AMBULATORY - HEALTHEAST (OUTPATIENT)
Dept: NURSING | Facility: CLINIC | Age: 76
End: 2021-03-26

## 2021-03-30 ENCOUNTER — COMMUNICATION - HEALTHEAST (OUTPATIENT)
Dept: SCHEDULING | Facility: CLINIC | Age: 76
End: 2021-03-30

## 2021-03-30 DIAGNOSIS — N94.819 VULVODYNIA: ICD-10-CM

## 2021-03-30 DIAGNOSIS — F11.90 CHRONIC, CONTINUOUS USE OF OPIOIDS: ICD-10-CM

## 2021-04-14 ENCOUNTER — OFFICE VISIT - HEALTHEAST (OUTPATIENT)
Dept: FAMILY MEDICINE | Facility: CLINIC | Age: 76
End: 2021-04-14

## 2021-04-14 DIAGNOSIS — F43.21 GRIEF REACTION: ICD-10-CM

## 2021-04-14 DIAGNOSIS — Z78.0 POST-MENOPAUSAL: ICD-10-CM

## 2021-04-14 DIAGNOSIS — J45.20 MILD INTERMITTENT ASTHMA WITHOUT COMPLICATION: ICD-10-CM

## 2021-04-14 DIAGNOSIS — F11.90 CHRONIC, CONTINUOUS USE OF OPIOIDS: ICD-10-CM

## 2021-04-14 DIAGNOSIS — F32.A DEPRESSION: ICD-10-CM

## 2021-04-14 DIAGNOSIS — Z00.00 MEDICARE ANNUAL WELLNESS VISIT, SUBSEQUENT: ICD-10-CM

## 2021-04-14 DIAGNOSIS — N94.819 VULVODYNIA: ICD-10-CM

## 2021-04-14 DIAGNOSIS — F40.243 FEAR OF FLYING: ICD-10-CM

## 2021-04-14 DIAGNOSIS — L94.0 CIRCUMSCRIBED SCLERODERMA: ICD-10-CM

## 2021-04-14 DIAGNOSIS — F33.41 RECURRENT MAJOR DEPRESSIVE DISORDER, IN PARTIAL REMISSION (H): ICD-10-CM

## 2021-04-14 DIAGNOSIS — I73.00 RAYNAUD'S DISEASE WITHOUT GANGRENE: ICD-10-CM

## 2021-04-14 DIAGNOSIS — F33.42 RECURRENT MAJOR DEPRESSIVE DISORDER, IN FULL REMISSION (H): ICD-10-CM

## 2021-04-14 LAB
CHOLEST SERPL-MCNC: 222 MG/DL
FASTING STATUS PATIENT QL REPORTED: YES
FASTING STATUS PATIENT QL REPORTED: YES
GLUCOSE BLD-MCNC: 94 MG/DL (ref 70–125)
HDLC SERPL-MCNC: 66 MG/DL
LDLC SERPL CALC-MCNC: 135 MG/DL
TRIGL SERPL-MCNC: 103 MG/DL

## 2021-04-14 RX ORDER — DULOXETIN HYDROCHLORIDE 60 MG/1
60 CAPSULE, DELAYED RELEASE ORAL DAILY
Qty: 90 CAPSULE | Refills: 1 | Status: SHIPPED | OUTPATIENT
Start: 2021-04-14 | End: 2021-11-16

## 2021-04-14 ASSESSMENT — ANXIETY QUESTIONNAIRES
5. BEING SO RESTLESS THAT IT IS HARD TO SIT STILL: NOT AT ALL
2. NOT BEING ABLE TO STOP OR CONTROL WORRYING: SEVERAL DAYS
4. TROUBLE RELAXING: NOT AT ALL
3. WORRYING TOO MUCH ABOUT DIFFERENT THINGS: SEVERAL DAYS
IF YOU CHECKED OFF ANY PROBLEMS ON THIS QUESTIONNAIRE, HOW DIFFICULT HAVE THESE PROBLEMS MADE IT FOR YOU TO DO YOUR WORK, TAKE CARE OF THINGS AT HOME, OR GET ALONG WITH OTHER PEOPLE: NOT DIFFICULT AT ALL
GAD7 TOTAL SCORE: 3
7. FEELING AFRAID AS IF SOMETHING AWFUL MIGHT HAPPEN: SEVERAL DAYS
1. FEELING NERVOUS, ANXIOUS, OR ON EDGE: NOT AT ALL
6. BECOMING EASILY ANNOYED OR IRRITABLE: NOT AT ALL

## 2021-04-14 ASSESSMENT — PATIENT HEALTH QUESTIONNAIRE - PHQ9: SUM OF ALL RESPONSES TO PHQ QUESTIONS 1-9: 3

## 2021-04-14 ASSESSMENT — MIFFLIN-ST. JEOR: SCORE: 1079.43

## 2021-04-26 ENCOUNTER — RECORDS - HEALTHEAST (OUTPATIENT)
Dept: BONE DENSITY | Facility: CLINIC | Age: 76
End: 2021-04-26

## 2021-04-26 ENCOUNTER — RECORDS - HEALTHEAST (OUTPATIENT)
Dept: ADMINISTRATIVE | Facility: OTHER | Age: 76
End: 2021-04-26

## 2021-04-26 DIAGNOSIS — Z78.0 ASYMPTOMATIC MENOPAUSAL STATE: ICD-10-CM

## 2021-05-26 ASSESSMENT — PATIENT HEALTH QUESTIONNAIRE - PHQ9
SUM OF ALL RESPONSES TO PHQ QUESTIONS 1-9: 17
SUM OF ALL RESPONSES TO PHQ QUESTIONS 1-9: 10
SUM OF ALL RESPONSES TO PHQ QUESTIONS 1-9: 19

## 2021-05-27 ASSESSMENT — PATIENT HEALTH QUESTIONNAIRE - PHQ9
SUM OF ALL RESPONSES TO PHQ QUESTIONS 1-9: 6
SUM OF ALL RESPONSES TO PHQ QUESTIONS 1-9: 3
SUM OF ALL RESPONSES TO PHQ QUESTIONS 1-9: 3

## 2021-05-27 NOTE — PROGRESS NOTES
Assessment/plan   Demetrice Garcia is a 73 y.o. female who is established to my practice.    Demetrice was seen today for medication refill and anxiety.    Diagnoses and all orders for this visit:    Chronic pain syndrome.   Vulvodynia s/t lichen sclerosis.  On continuous tramadol.    See my note from 11/26/18 reviewing her notable hx/work up/alternative pain therapies attempted in the past.   Reviewed how patient has tolerated tramadol 100 mg daily.  She takes this as two 50 mg tablets each morning (and once per month or less: needs to repeat this dose for max of 6 tabs that day if traveling/prolonged sitting).    - CSA was signed 11/26/18-  q4 months office visit. (will space to 5-6 months given my upcoming maternity leave)   - Reviewed  record which was not pulling up data so we did call her pharmacy in [place] and confirmed last fill was on 3/11/19.   - PEG score today is 1.   - Subject to random urine screens in the future.- will check today  - She declined exam today- no physical concerns/lesions ever, just the pain  - BRADLEY signed today for [facility] to review her records  - Did educate that if this summer she is active and moving around more she could consider decreasing the tramadol to 1 tablet in the morning and to repeat later in the daytime as opposed to taking 2 at the same time.      Fear of flying.  Reviewed options for flight anxiety including benzodiazepine versus beta-blocker.  After discussion of risk-benefit profile in context of chronic tramadol use, I do feel would be reasonable to start with the beta-blocker idea and proceed to a benzodiazepine as a second option if the beta-blocker is not sufficient.  We will to the lower dose and she can take 1 or 2 tablets about an hour before her flight.   -     propranolol (INDERAL) 20 MG tablet; Take 1-2 tablets (20-40 mg total) by mouth 3 (three) times a day.    Recurrent major depressive disorder, in full remission (H)  Stable, doing really  "well on her fluoxetine and sertraline.    Visit for screening mammogram  -     Mammo Screening Bilateral; Future    Follow up: in September after my maternity leave (this will be about 5-6 months out).     Gogo Nam MD    Subjective:      HPI: Demetrice Garcia is a 73 y.o. female who is here for:    Chief Complaint   Patient presents with     Medication Refill     Anxiety     has a fear of flying and would like to discuss options       Chronic pain follow-up: See my note from 11/26/18 which reviewed her diagnosis of lichen sclerosus and vulvodynia diagnosed over 20 years ago for which she has been taking tramadol this past 20 years and tolerating without side effect.     Doing well- had 6 weeks of travel to [facility] and \"life was good\".   Taking the tramadol 2 tablets per day in the morning.  During travel the most she needed was 4 tablets per day. She had to lay flat in the back. Winter is harder for her because she is more sedentary.    Last dose tramadol this AM.   She did provide the name of her gynecologist, Dr. Hirsch with [facility] so we will attempt to obtain records today.    History: Has had spine injections 4x at [facility] to try to help with the pain, but now the pain is managed best with the tramadol.  She explains she was recommended at the [facility] to go see a \"super specialist \"though that specialist stated there was nothing she could do for her regarding the pain.  Patient states there has not been any rash or physical concerns, but mostly sensation of pain with sitting for prolonged periods of time.  She declined exam today.  She reports she has also been on Prempro, clobetasol, and Estrace in the years past for this condition which were not helpful.    Fear of flying:  When they travel they usually drive because she has such strong fear of driving. Hasn't flown in years.  Her daughter asked her to ask the doctor about potentially using a beta-blocker because her 's family reunion " is coming up- in [place] in May.     Depression: doing really well. on duloxetine 60 mg daily and sertraline 100 mg daily.  Her PHQ 9 score is 0.    Review of Systems:  12 point comprehensive review of systems was negative except as noted and HPI     Social History:  Social History     Social History Narrative    Lives with her , followed at [facility] for vulvodynia and on chronic pain med- tramadol.     2 grown sons    2 grand kids (26 yr old GD and 23 yr old GS)        Gogo Nam MD       Medical History:  Patient Active Problem List   Diagnosis     Lichen Sclerosus Et Atrophicus     Mild Intermittent Asthma     Vulvodynia     Insomnia     Recurrent major depressive disorder, in full remission (H)     Chronic, continuous use of tramadol for vulvodynia/lichen sclerosis     Past Medical History:   Diagnosis Date     Collagenous colitis 9/4/2018     CXR Lungs Solitary Pulmonary Nodule (___ Cm)      CT from 11/2015: 1.3 x 0.9 cm left lower lobe pulmonary nodule, stable to minimally decreased when compared to 6/24/2014. Recommend one additional follow-up to document 2 years of stability. Pt declines repeat CT as of 11/2018.     Past Surgical History:   Procedure Laterality Date     BREAST BIOPSY Left 2012 ?     ROTATOR CUFF REPAIR       Shellfish containing products and Naproxen  Family History   Problem Relation Age of Onset     Pancreatic cancer Father 86       Medications:  Current Outpatient Medications   Medication Sig Dispense Refill     calcium carbonate (OS-TRINY) 600 mg calcium (1,500 mg) tablet Take 1 tablet (600 mg total) by mouth daily. 100 tablet 2     DULoxetine (CYMBALTA) 60 MG capsule Take 1 capsule (60 mg total) by mouth daily. 90 capsule 2     sertraline (ZOLOFT) 100 MG tablet Take 1 tablet (100 mg total) by mouth daily. 90 tablet 2     [START ON 4/11/2019] traMADol (ULTRAM) 50 mg tablet Take 2 tablets (100 mg total) by mouth daily. Can take up to 6 tablets max on days of travel. (2 tabs  every 8 hours) 60 tablet 3     propranolol (INDERAL) 20 MG tablet Take 1-2 tablets (20-40 mg total) by mouth 3 (three) times a day. 20 tablet 0     No current facility-administered medications for this visit.        Imaging & Labs reviewed this visit: none      Objective:      Vitals:    03/27/19 0937   BP: 102/64   Pulse: 88   Weight: 136 lb 8 oz (61.9 kg)       Physical Exam:   General: Alert, no acute distress. Petite elderly woman, well dressed.   HEENT: normocephalic conjunctivae are clear. EOMI  Neck: supple   Lungs: Normal effort  Heart: regular rate  Abdomen: soft   Skin: clear without rash or lesions  Neuro: alert, oriented  Psych: mood good, affect appropriate, good eye contact, insight and judgment intact, normal speech pattern.    PHQ9 score 0       GAD7 score 0      Gogo Nam MD

## 2021-05-28 ASSESSMENT — ANXIETY QUESTIONNAIRES
GAD7 TOTAL SCORE: 5
GAD7 TOTAL SCORE: 3
GAD7 TOTAL SCORE: 1
GAD7 TOTAL SCORE: 18
GAD7 TOTAL SCORE: 4
GAD7 TOTAL SCORE: 18

## 2021-05-28 ASSESSMENT — ASTHMA QUESTIONNAIRES
ACT_TOTALSCORE: 20
ACT_TOTALSCORE: 25
ACT_TOTALSCORE: 22

## 2021-05-30 ENCOUNTER — RECORDS - HEALTHEAST (OUTPATIENT)
Dept: ADMINISTRATIVE | Facility: CLINIC | Age: 76
End: 2021-05-30

## 2021-05-31 VITALS — BODY MASS INDEX: 22.78 KG/M2 | WEIGHT: 128.6 LBS

## 2021-05-31 VITALS — WEIGHT: 129.1 LBS | HEIGHT: 63 IN | BODY MASS INDEX: 22.88 KG/M2

## 2021-05-31 VITALS — WEIGHT: 132 LBS | BODY MASS INDEX: 23.38 KG/M2

## 2021-05-31 VITALS — BODY MASS INDEX: 22.57 KG/M2 | WEIGHT: 132.5 LBS

## 2021-05-31 NOTE — PATIENT INSTRUCTIONS - HE
Thank you for discussing your sleep concerns in clinic today. In order to improve your sleep, I recommend:     --No pets in the bedroom  --No caffeine consumption after 4 p.m.  --Keep bedroom cool and conducive to sleep  --No watching the bedroom clock  --No nicotine use, especially in the evening  --No exercising within 2 to 3 hours before bedtime    Avoid excessive stimulation by doing the following:  --Go to bed only when sleepy  --Use the bedroom only for sleep and sex  --Go to another room if unable to fall asleep within 15 to 20 minutes  --Read or engage in other quiet activities and return to bed only when sleepy    It may be helpful to keep a sleep journal and record:  --How long it took to fall asleep the previous night  --Whether you took any sleep aid  --How many times you woke up during the night  --What time you woke up in the morning  --How restful you felt your sleep was the previous night  --When and if you took naps during the day

## 2021-05-31 NOTE — TELEPHONE ENCOUNTER
Controlled Substance Refill Request  Medication:   Requested Prescriptions     Pending Prescriptions Disp Refills     traMADol (ULTRAM) 50 mg tablet [Pharmacy Med Name: TRAMADOL HCL 50 MG TABLET] 60 tablet 3     Sig: TAKE 2 TABLETS BY MOUTH EVERY DAY MAY TAKE UP TO 6 TABS ON TRAVEL DAYS (2 TABS EVERY 8 HRS)     Date Last Fill: 3/27/19  [id number]   Submit electronically to pharmacy  Controlled Substance Agreement on File:   Encounter-Level CSA Scan Date:    There are no encounter-level csa scan date.       Last office visit: Last office visit pertaining to requested medication was 3/27/19.

## 2021-05-31 NOTE — TELEPHONE ENCOUNTER
Called and informed pt and informed she still needs to be seen in clinic tomorrow. Pt verbalized understanding and said she's been about 4 days w/o her meds but appreciates the refill and she will be into the clinic in the AM.

## 2021-05-31 NOTE — TELEPHONE ENCOUNTER
Patient Returning Call  Reason for call:  Patient returning call  Information relayed to patient:  I want to let Gogo Nam MD know I am scheduled to seen her on 8/30/19 but request a few pills to get me to this appointment.   Patient has additional questions:  Yes  If YES, what are your questions/concerns:  I took my last 2 pills today and need this ASAP  Okay to leave a detailed message?: Yes

## 2021-05-31 NOTE — PROGRESS NOTES
Assessment/plan   Demetrice Garcia is a 73 y.o. female who is established to my practice.    Demetrice was seen today for medication check and fatigue.     Diagnoses and all orders for this visit:    Chronic pain syndrome.   Vulvodynia s/t lichen sclerosis.  On continuous tramadol.    See my note from 11/26/18 reviewing her notable hx/work up/alternative pain therapies attempted in the past.   Reviewed how patient has tolerated tramadol 100 mg daily.  She takes this as two 50 mg tablets each morning (and once per month or less: needs to repeat this dose for max of 6 tabs that day if traveling/prolonged sitting-such as recently while her  was in the hospital for his CABG and multiple  healthcomplications and 6-week +recovery).    - CSA was signed 11/26/18-  q4 months office visit, resign at next visit, ~Dec   - Reviewed  record which was not pulling up data so we called her pharmacy in [place] and confirmed last fill was on 7/23, 6/17, 5/7 all from the original prescription I sent in March. Appropriate fills.   - PEG score today is 1.   - Subject to random urine screens in the future, done at 3/2019 visit.   - She declined exam today- no physical concerns/lesions ever, just the pain    Fatigue.  Discussed the significant health condition of her  at the end of May is likely contributing to her lingering fatigue.  We did review low likelihood for sleep apnea or sleep disorder at this point.  Starting with a few basic labs to evaluate the fatigue further.  She will see me back if this is not improving with more time.  Sleep hygiene tips reviewed.  I do think she has some room to change her routine from 7 to 10 PM while she is waiting to fall asleep and we discussed this in greater detail.  -CBC, BMP, TSH    Fear of flying.  Was prescribed propranolol based on family hx of success and this was likely helpful for her but due to health stressors of her  during plane flight it was hard for her to know if  the medicine really helped or not. We will keep this on her med list for future trials in less stressful situations.     Recurrent major depressive disorder, in full remission (H)  Stable, doing really well on her Cymbalta and sertraline despite all her 's health concerns. Not yet due for med refills      I spent 25 minutes with the patient, >50% of which was in counseling regarding patient's medical issues as noted above.    Follow up: in 4 months, ~Dec    Gogo Nam MD    Subjective:      HPI: Demetrice Garcia is a 73 y.o. female who is here for:    Chief Complaint   Patient presents with     Medication Management     Fatigue     very tired recently and wondering if she needs to do blood work, pt is not fasting        Chronic pain follow-up: See my note from 11/26/18 which reviewed her diagnosis of lichen sclerosus and vulvodynia diagnosed over 20 years ago for which she has been taking tramadol this past 20 years and tolerating without side effect.     Doing well.   Taking the tramadol 2 tablets per day in the morning.   Her  was [place] the [place] another state while they were vacationing in [place] because he developed heart attack and underwent CABG.  During this time she did have more prolonged sitting as she was by his side 24/7.  For this reason she did use about 6 tablets/day which is how she tends to need her medication during times of prolonged sitting.  She found this was effective and no side effects reported.     Depression: doing really well. on duloxetine 60 mg daily and sertraline 100 mg daily.  Her PHQ 9 score is 4.    Fatigue: seems tired right after she wakes up; wants to lay down for bed at 630pm but doesn't because she isn't fully tired/ready.   Sx began end of May- her  had 3 major cardiac vessels blocked- CABG done, 6 week recovery, only just now feeling well. Was on a feeding tube and dialysis and fluid in lungs removed. She was tired from this ordeal.  "But then recovered and feels he has improved from health standpoint so wondering why her own fatigue is lingering. Her eyes feel heavy.  No nighttime snoring.  H/o rotator cuff surgery on right arm so does cause tossing/turning at night.   Wakes at 6:30am, bed at 7pm but asleep by 10:30.   Constantly active \"all day\"- plants [employer], [place]    Review of Systems:  + fear of flying- used the propranolol before her flights, but not sure if it helped because she was flying out of state to get to her sick  who just had a heart attack. Did take it on the trip home but again couldn't wait to be home so not sure if it helped.   12 point comprehensive review of systems was negative except as noted and HPI     Social History:  Social History     Social History Narrative    Lives with her , followed at [facility] for vulvodynia and on chronic pain med- tramadol.     2 grown sons    2 grand kids (26 yr old GD and 23 yr old GS)        Gogo Nam MD       Medical History:  Patient Active Problem List   Diagnosis     Lichen Sclerosus Et Atrophicus     Asthma     Vulvodynia     Insomnia     Recurrent major depressive disorder, in full remission (H)     Chronic, continuous use of tramadol for vulvodynia/lichen sclerosis     Fear of flying     Past Medical History:   Diagnosis Date     Collagenous colitis 9/4/2018     CXR Lungs Solitary Pulmonary Nodule (___ Cm)      CT from 11/2015: 1.3 x 0.9 cm left lower lobe pulmonary nodule, stable to minimally decreased when compared to 6/24/2014. Recommend one additional follow-up to document 2 years of stability. Pt declines repeat CT as of 11/2018.     Past Surgical History:   Procedure Laterality Date     BREAST BIOPSY Right 2012 ?     ROTATOR CUFF REPAIR       Shellfish containing products and Naproxen  Family History   Problem Relation Age of Onset     Pancreatic cancer Father 86       Medications:  Current Outpatient Medications   Medication Sig Dispense Refill "     calcium carbonate (OS-TRINY) 600 mg calcium (1,500 mg) tablet Take 1 tablet (600 mg total) by mouth daily. 100 tablet 2     DULoxetine (CYMBALTA) 60 MG capsule Take 1 capsule (60 mg total) by mouth daily. 90 capsule 2     sertraline (ZOLOFT) 100 MG tablet Take 1 tablet (100 mg total) by mouth daily. 90 tablet 2     traMADol (ULTRAM) 50 mg tablet TAKE 2 TABLETS BY MOUTH EVERY DAY MAY TAKE UP TO 6 TABS ON TRAVEL DAYS (2 TABS EVERY 8 HRS) 60 tablet 3     propranolol (INDERAL) 20 MG tablet Take 1-2 tablets (20-40 mg total) by mouth 3 (three) times a day. 20 tablet 0     No current facility-administered medications for this visit.        Imaging & Labs reviewed this visit: CBC and BMP normal 9/2018      Objective:      Vitals:    08/30/19 0940   BP: 112/60   Pulse: 100   Weight: 134 lb (60.8 kg)       Physical Exam:   General: Alert, no acute distress. Petite elderly woman, well dressed.   HEENT: normocephalic conjunctivae are clear. EOMI  Neck: supple   Lungs: Normal effort, CTAB  Heart: regular rate and rhythm, no murmurs  Abdomen: soft   Skin: clear without rash or lesions  Neuro: alert, oriented  Psych: mood good, affect appropriate, good eye contact, insight and judgment intact, normal speech pattern.    PHQ9 score 4  (primarily for fatigue)     GAD7 score 4      Gogo Nam MD

## 2021-06-01 VITALS — BODY MASS INDEX: 22.83 KG/M2 | HEIGHT: 64 IN | WEIGHT: 133.7 LBS

## 2021-06-01 VITALS — WEIGHT: 133 LBS | BODY MASS INDEX: 23.56 KG/M2

## 2021-06-02 VITALS — BODY MASS INDEX: 23.94 KG/M2 | HEIGHT: 63 IN | WEIGHT: 135.1 LBS

## 2021-06-02 VITALS — BODY MASS INDEX: 24.18 KG/M2 | WEIGHT: 136.5 LBS

## 2021-06-02 NOTE — TELEPHONE ENCOUNTER
"12:05 PM  Info to relay to pt:     I left a voicemail on Demetrice's home number to relay my sincerest condolences at the recent events as well as indicated I would be sending a medication in and also I would call [employer] cell phone.  When I called Stanton cell phone it said the message box is \"not active \".  Unable to leave message at that part.      I am willing to prescribe a short-term prescription of  Ativan to take twice daily as needed. Risks of this benzodiazepine  medication that can be relayed to pt would include risk for sedation, confusion, sleepiness, and risk for addiction if using more than prescribed.      If patient calls back please indicate that we have sent this in for her which can help with acute anxiety such as what she is experiencing, but I would like to see her in clinic before any more refills are given as this is a controlled substance and she is currently on tramadol which is a chronic and stable medication for her. She is already on some additional mood medications including cymbalta and Zoloft which help for maintenance.  Also, her propranolol can be used three times a day to help with anxiety.   Thanks,   Gogo Nam MD      "

## 2021-06-02 NOTE — TELEPHONE ENCOUNTER
Patient Returning Call  Reason for call:  Stanton called back.  Information relayed to patient:  Informed of Dr Nam's message listed below.  Stanton states understanding and agrees with plan.  Patient has additional questions:  No  If YES, what are your questions/concerns:    Okay to leave a detailed message?: No call back needed

## 2021-06-02 NOTE — TELEPHONE ENCOUNTER
Reached out to the [facility] who took the initial message. [facility] stated it is the patient Demetrice who is requesting the medication. Please advise. Thank you, Esmer Marino

## 2021-06-02 NOTE — TELEPHONE ENCOUNTER
Medication Request  Medication name: Anti-anxiety medication  Pharmacy Name and Location: [id number]  Reason for request: Patient's 55 year old son  last evening of a probable heart attack.  Patient is understandable upset and asking for a medication to help manage through this unexpected tragedy.  Stanton is tearful as we speak.  When did you use medication last?:  Has not used anything yet  Patient offered appointment:  Patient declined  Okay to leave a detailed message: Yes.  Please try Stanton's cell phone if he does not answer on the home phone.  Cell # [phone].

## 2021-06-03 VITALS
DIASTOLIC BLOOD PRESSURE: 64 MMHG | WEIGHT: 136.6 LBS | HEART RATE: 84 BPM | BODY MASS INDEX: 24.2 KG/M2 | SYSTOLIC BLOOD PRESSURE: 130 MMHG | HEIGHT: 63 IN

## 2021-06-03 VITALS — BODY MASS INDEX: 23.74 KG/M2 | WEIGHT: 134 LBS

## 2021-06-03 NOTE — TELEPHONE ENCOUNTER
Refill Approved    Rx renewed per Medication Renewal Policy. Medication was last renewed on 8.30.19.    Monika Hernandez, [facility] Triage/Med Refill 11/4/2019     Requested Prescriptions   Pending Prescriptions Disp Refills     sertraline (ZOLOFT) 100 MG tablet [Pharmacy Med Name: SERTRALINE  MG TABLET] 90 tablet 2     Sig: TAKE 1 TABLET BY MOUTH EVERY DAY       [facility] Refill Protocol  Passed - 11/4/2019  2:19 AM        Passed - PCP or prescribing provider visit in last year     Last office visit with prescriber/PCP: 8/30/2019 Gogo Nam MD OR same dept: 8/30/2019 Gogo Nam MD OR same specialty: 8/30/2019 Gogo Nam MD  Last physical: Visit date not found Last MTM visit: Visit date not found   Next visit within 3 mo: Visit date not found  Next physical within 3 mo: Visit date not found  Prescriber OR PCP: Gogo Nam MD  Last diagnosis associated with med order: 1. Recurrent major depressive disorder, in partial remission (H)  - sertraline (ZOLOFT) 100 MG tablet [Pharmacy Med Name: SERTRALINE  MG TABLET]; TAKE 1 TABLET BY MOUTH EVERY DAY  Dispense: 90 tablet; Refill: 2    If protocol passes may refill for 12 months if within 3 months of last provider visit (or a total of 15 months).

## 2021-06-03 NOTE — PROGRESS NOTES
"Assessment/plan   Demetrice Garcia is a 74 y.o. female who is established to my practice.    Demetrice was seen today for follow-up.    Diagnoses and all orders for this visit:    Grief in setting of    Recurrent major depressive disorder, previously in full remission (H)  PHQ-9 Total Score: 19 (11/6/2019 12:00 PM)  . JOSÉ LUIS-7 Total: 18 (11/6/2019 12:00 PM)  JOSÉ LUIS 7 Total Score: 4 (8/30/2019  1:00 PM)  High PHQ9/GAD7 scores, previously depression in remission.   Today's visit revolved around her recent experience of her son's unexpected death last month. She is incredibly tearful and reports feelings of not wanting to \"go on\", but denies active plans for SI/HI and vouches for safety with her other family supports. She is agreeable to counseling and referral placed. I've agreed to refill the Ativan at this time as she is tolerating it without side effects, and she does find this helps to calm the anxiety and panicky feeling regarding her grief reaction and adjustment. She can also take her propranolol twice daily for anxiety which was previously prescribed for anxiety prior to flying (with goal at that time to avoid benzodiazepines due to age and being on SSRI already).  She will continue her Cymbalta and sertraline which was prescribed for her for many years by another provider and has been doing well on that dose previously without side effects of these agents.   -     Ambulatory referral to Psychology  -     propranolol (INDERAL) 20 MG tablet; Take 1 tablet (20 mg total) by mouth 2 (two) times a day.  -     LORazepam (ATIVAN) 0.5 MG tablet; Take 1 tablet (0.5 mg total) by mouth 2 (two) times a day as needed for anxiety. Max 2 per day.    Need for vaccination  -     Varicella Zoster, Recombinant Vaccine IM  -     Influenza High Dose, Seasonal 65+ yrs      I spent 25 minutes with the patient, >50% of which was in counseling regarding patient's medical issues as noted above.      Follow up: Due to all the serotonergic agents she " is on, I have avoided escalating her medication routine with trazodone, hydroxyzine or increasing her SSRIs.  At this point I will continue the Ativan for another month but we will see her in the next 2 to 3 weeks for follow-up.  Consider trazodone for sleep    She will need a new CSA at that time, time did not permit this conversation today; for her chronic pain/tramadol for vulvodynia      Gogo aNm MD    Subjective:      HPI: Demetrice Garcia is a 74 y.o. female who is here for:    Chief Complaint   Patient presents with     Follow-up     med check/ son passed 1 month ago wants med to handle grief        Med check- discuss grief:    On 10/10/2019 there was a phone call encounter as she was requesting medication to help with significant grief at the recent death of her son.  I had given a one-time prescription for Ativan and asked her to follow-up so that we could discuss this further as she is already on Cymbalta, Zoloft, tramadol.    She was incredibly tearful this entire encounter, and states she cannot really function at all because she is always crying.  She states she no longer drives herself because she will get in the car and just start crying and cannot see because of the tears.  She does live with her other son and daughter-in-law and she dog sits for them.  She finds these encounters refreshing and something to look forward to.    Her  has been through significant CABG and other medical issues and he is coping okay with the loss at this time.    She is interested in meeting with a grief counselor, hopefully group sessions for the loss of a child.  We will help her look into this.    She has been using the Ativan twice a day as prescribed and has about 10 tablets left.  She is not experiencing any side effects and does find this helpful.    She is agreeable to try propranolol during the daytime.  Prefers to avoid gabapentin due to history of side effects in other family members who have  taken this.       Review of Systems:  12 point comprehensive review of systems was negative except as noted and HPI     Social History:  Social History     Patient does not qualify to have social determinant information on file (likely too young).   Social History Narrative    Lives with her , followed at [facility] for vulvodynia and on chronic pain med- tramadol.     2 grown sons    2 grand kids (26 yr old GD and 23 yr old GS)        Gogo Nam MD       Medical History:  Patient Active Problem List   Diagnosis     Lichen Sclerosus Et Atrophicus     Asthma     Vulvodynia     Insomnia     Recurrent major depressive disorder, in full remission (H)     Chronic, continuous use of tramadol for vulvodynia/lichen sclerosis     Fear of flying     Past Medical History:   Diagnosis Date     Collagenous colitis 9/4/2018     CXR Lungs Solitary Pulmonary Nodule (___ Cm)      CT from 11/2015: 1.3 x 0.9 cm left lower lobe pulmonary nodule, stable to minimally decreased when compared to 6/24/2014. Recommend one additional follow-up to document 2 years of stability. Pt declines repeat CT as of 11/2018.     Past Surgical History:   Procedure Laterality Date     BREAST BIOPSY Right 2012 ?     ROTATOR CUFF REPAIR       Shellfish containing products and Naproxen  Family History   Problem Relation Age of Onset     Pancreatic cancer Father 86       Medications:  Current Outpatient Medications   Medication Sig Dispense Refill     sertraline (ZOLOFT) 100 MG tablet TAKE 1 TABLET BY MOUTH EVERY DAY 90 tablet 0     traMADol (ULTRAM) 50 mg tablet TAKE 2 TABLETS BY MOUTH EVERY DAY MAY TAKE UP TO 6 TABS ON TRAVEL DAYS (2 TABS EVERY 8 HRS) 60 tablet 3     calcium carbonate (OS-TRINY) 600 mg calcium (1,500 mg) tablet Take 1 tablet (600 mg total) by mouth daily. 100 tablet 2     DULoxetine (CYMBALTA) 60 MG capsule Take 1 capsule (60 mg total) by mouth daily. 90 capsule 2     LORazepam (ATIVAN) 0.5 MG tablet Take 1 tablet (0.5 mg total) by  "mouth 2 (two) times a day as needed for anxiety. Max 2 per day. 60 tablet 0     propranolol (INDERAL) 20 MG tablet Take 1 tablet (20 mg total) by mouth 2 (two) times a day. 20 tablet 0     No current facility-administered medications for this visit.        Imaging & Labs reviewed this visit: none      Objective:      Vitals:    11/06/19 1047   BP: 130/64   Pulse: 84   Weight: 136 lb 9.6 oz (62 kg)   Height: 5' 3\" (1.6 m)       Physical Exam:   General: Alert, moderate emotional acute distress.   HEENT: normocephalic conjunctivae are clear. EOMI  Neck: supple   Lungs: Normal effort  Heart: regular rate  Abdomen: soft   Skin: clear without rash or lesions  Neuro: alert, oriented  Psych: well dressed, mood depressed, tearful, affect congruent with mood, appropriate eye contact, insight and judgment intact, normal speech pattern. No active or passive SI/HI.     PHQ9 score PHQ-9 Total Score: 19 (11/6/2019 12:00 PM)    Little interest or pleasure in doing things: Nearly every day  Feeling down, depressed, or hopeless: Nearly every day  Trouble falling or staying asleep, or sleeping too much: Nearly every day  Feeling tired or having little energy: Nearly every day  Poor appetite or overeating: Several days  Feeling bad about yourself - or that you are a failure or have let yourself or your family down: More than half the days  Trouble concentrating on things, such as reading the newspaper or watching television: More than half the days  Moving or speaking so slowly that other people could have noticed. Or the opposite - being so fidgety or restless that you have been moving around a lot more than usual: Several days  Thoughts that you would be better off dead, or of hurting yourself in some way: Several days  PHQ-9 Total Score: 19  If you checked off any problems, how difficult have these problems made it for you to do your work, take care of things at home, or get along with other people?: Very difficult    GAD7 score " 59 year old female with a pressure ulcer of the chin s/p ET tube and being prone. Over the last few weeks the patient has recovered with local wound care.  She has no major issues with her wound except some mild raised scar. no open wounds or contractures noted. No pain noted.    Plans:)    1.) Recommend Silicone based scar sheets to the chin, Apply for 2-3 days and then replace with new sheets  2.) Improve nutrition  3.) No ointments needed over the wound  4.) Patient can follow up with me PRN as an outpatient if she wishes after discharge  5.) No plastic surgical intervention needed.     Please recall if needed JOSÉ LUIS-7 Total: 18 (11/6/2019 12:00 PM)  JOSÉ LUIS 7 Total Score: 4 (8/30/2019  1:00 PM)    No data recorded      Gogo Nam MD

## 2021-06-03 NOTE — PROGRESS NOTES
Assessment/plan   Demetrice Garcia is a 74 y.o. female who is established to my practice.    Demetrice was seen today for follow-up.    Diagnoses and all orders for this visit:    Grief reaction with insomnia in setting of   Recurrent major depressive disorder, in partial remission (H), previous to her son's death; depression was in remission/stable on meds  PHQ9 and GAD7 still remain high, just one point lower at 18/17 today. Depression triggered by son's unexpected death last month.  Today denies any thoughts of passive SI, never had any active SI. Continues to vouche for safety with her other family supports.   - She will continue her Cymbalta and sertraline which was prescribed for her for many years by another provider and has been doing well on that dose previously without side effects of these agents. I've addressed today we could consider decreasing one of these going forward with transition to an alternative medication such as trazodone to help with sleep. Right now she prefers to hold off.  - Continue propranolol in AM, 0.5mg Ativan in PM   - Continue with grief therapy  - Will help set up group or individual therapy going forward  - Encouraged to continue with exercise and time with family  - Discussed use of lavender essential oil and melatonin for sleep  - Sleep hygiene reviewed as well in efforts to avoid additional pharmacotherapy    Chronic, s/t vulvodynia/lichen sclerosis  Controlled substance agreement signed 11/25/19  See my note from 11/26/18 reviewing her notable hx/work up/alternative pain therapies attempted in the past.   Pt has tolerated tramadol 100 mg daily for 20+ years.   She takes this as two 50 mg tablets each morning (and generally once per month or less: needs to repeat this dose for max of 6 tabs that day if traveling/prolonged sitting).    - CSA was updated today 11/25/19-  q4 months office visit  - Reviewed  record which was not pulling up data as typical so we called her pharmacy in  "[place] and confirmed Appropriate fills. Due to her frustration with that pharmacy, she has elected to change pharmacies from [facility] to [facility] going forward  - PEG score today is 1 while using tramadol to control sx.   - Subject to random urine screens in the future, done at 3/2019 visit.   - She declined exam today- no physical concerns/lesions ever, just the pain  - Due for refill today:  -     traMADol (ULTRAM) 50 mg tablet; TAKE 2 TABLETS BY MOUTH EVERY DAY MAY TAKE UP TO 6 TABS ON TRAVEL DAYS (2 TABS EVERY 8 HRS)      Follow up: 4 months or sooner    Gogo Nam MD    Subjective:      HPI: Demetrice Garcia is a 74 y.o. female who is here for:    Chief Complaint   Patient presents with     Follow-up     medications/ mood       F/u grief, with previous depression: Her son  recently which had worsened her depression. Following up today.  Feeling a little better. The tearfulness is reducing, to a point now that she can drive herself places. She has been dog sitting for her family.  She no longer has feelings of \"not wanting to go on\".  She did see a grief counselor through the .  With the help of the counselor, she is now understanding she has family here on earth still and she needs to be present for them at this point. She would still like to meet with a group counselor for sessions on loss of a child.     Has trouble to sleep at night. \"sleeping but I feel awake, did I even sleep an hour?\". She is now at least staying in bed, not up crying as she was before. Ruminating.   Has been taking Ativan just at bedtime and propranolol was twice daily but now just once daily.      She was given an Rx of #60 tablets of 0.5 mg Ativan was sent .  To use up to twice daily as needed.  She is not experiencing any side effects and does find this helpful.  Prefers to avoid gabapentin due to history of side effects in other family members who have taken this.     Chronic pain s/t vulvodynia: lichen sclerosus " "and vulvodynia diagnosed over 20 years ago for which she has been taking tramadol this past 20 years and tolerating without side effect.   Taking the tramadol 100mg per day in the morning (as two 50mg tabs)  During travel the most she needed was 4 tablets per day. She had to lay flat in the back. Winter is harder for her because she is more sedentary.    Last dose tramadol this AM. She understands eventual goal to wean down on the dose.    History: Has had spine injections 4x at [place] to try to help with the pain, but now the pain is managed best with the tramadol.  She explains she was recommended at the [facility] to go see a \"super specialist \"though that specialist stated there was nothing she could do for her regarding the pain.  Patient states there has not been any rash or physical concerns, but mostly sensation of pain with sitting for prolonged periods of time.  She declined exam today.  She reports she has also been on Prempro, clobetasol, and Estrace in the years past for this condition which were not helpful. I was able to reconciled these meds through careeverywhere to confirm this.      Review of Systems:  12 point comprehensive review of systems was negative except as noted and HPI     Social History:  Social History     Social History Narrative    Lives with her , followed at [place] for vulvodynia and on chronic pain med- tramadol.     2 grown sons    2 grand kids (26 yr old GD and 23 yr old GS)        Gogo Nam MD       Medical History:  Patient Active Problem List   Diagnosis     Lichen Sclerosus Et Atrophicus     Asthma     Vulvodynia     Insomnia     Recurrent major depressive disorder, in full remission (H)     Chronic, continuous use of tramadol for vulvodynia/lichen sclerosis     Fear of flying     Controlled substance agreement signed 11/25/19- Tramadol 50mg tabs- takes 100mg daily w/ PRN during travel 50mg q6 hr as needed, MELECIO Duval, f/u q6 months     Past Medical " "History:   Diagnosis Date     Collagenous colitis 9/4/2018     CXR Lungs Solitary Pulmonary Nodule (___ Cm)      CT from 11/2015: 1.3 x 0.9 cm left lower lobe pulmonary nodule, stable to minimally decreased when compared to 6/24/2014. Recommend one additional follow-up to document 2 years of stability. Pt declines repeat CT as of 11/2018.     Past Surgical History:   Procedure Laterality Date     BREAST BIOPSY Right 2012 ?     ROTATOR CUFF REPAIR       Shellfish containing products and Naproxen  Family History   Problem Relation Age of Onset     Pancreatic cancer Father 86       Medications:  Current Outpatient Medications   Medication Sig Dispense Refill     calcium carbonate (OS-TRINY) 600 mg calcium (1,500 mg) tablet Take 1 tablet (600 mg total) by mouth daily. 100 tablet 2     LORazepam (ATIVAN) 0.5 MG tablet Take 1 tablet (0.5 mg total) by mouth 2 (two) times a day as needed for anxiety. Max 2 per day. 60 tablet 0     propranolol (INDERAL) 20 MG tablet Take 1 tablet (20 mg total) by mouth 2 (two) times a day. 20 tablet 0     DULoxetine (CYMBALTA) 60 MG capsule Take 1 capsule (60 mg total) by mouth daily. 90 capsule 2     sertraline (ZOLOFT) 100 MG tablet Take 1 tablet (100 mg total) by mouth daily. 90 tablet 0     traMADol (ULTRAM) 50 mg tablet TAKE 2 TABLETS BY MOUTH EVERY DAY MAY TAKE UP TO 6 TABS ON TRAVEL DAYS (2 TABS EVERY 8 HRS) 60 tablet 3     No current facility-administered medications for this visit.        Imaging & Labs reviewed this visit: none      Objective:      Vitals:    11/25/19 1056   BP: 122/62   Pulse: 88   Weight: 137 lb 1.6 oz (62.2 kg)       Physical Exam:     General: Alert, mild emotional distress.   HEENT: normocephalic conjunctivae are clear. EOMI  Neck: supple   Lungs: Normal effort  Heart: regular rate  Abdomen: soft   Skin: clear without rash or lesions  Neuro: alert, oriented  Psych: well dressed, mood \" a little down still\", much less tearful, affect congruent with mood, " appropriate eye contact, insight and judgment intact, normal speech pattern. No active or passive SI/HI.    PHQ9 score PHQ-9 Total Score: 17 (11/25/2019 11:00 AM)    Little interest or pleasure in doing things: More than half the days  Feeling down, depressed, or hopeless: Nearly every day  Trouble falling or staying asleep, or sleeping too much: Nearly every day  Feeling tired or having little energy: Nearly every day  Poor appetite or overeating: Several days  Feeling bad about yourself - or that you are a failure or have let yourself or your family down: More than half the days  Trouble concentrating on things, such as reading the newspaper or watching television: More than half the days  Moving or speaking so slowly that other people could have noticed. Or the opposite - being so fidgety or restless that you have been moving around a lot more than usual: Several days  Thoughts that you would be better off dead, or of hurting yourself in some way: Not at all  PHQ-9 Total Score: 17  If you checked off any problems, how difficult have these problems made it for you to do your work, take care of things at home, or get along with other people?: Very difficult         Gogo Nam MD

## 2021-06-03 NOTE — PATIENT INSTRUCTIONS - HE
Try melatonin 3 mg 1-2 hours before bedtime.   Try lavender essential oil for sleep.     Thank you for discussing your sleep concerns in clinic today. In order to improve your sleep, I recommend:     --No pets in the bedroom  --No caffeine consumption after 4 p.m.  --Keep bedroom cool and conducive to sleep  --No watching the bedroom clock  --No nicotine use, especially in the evening  --No exercising within 2 to 3 hours before bedtime    Avoid excessive stimulation by doing the following:  --Go to bed only when sleepy  --Use the bedroom only for sleep and sex  --Go to another room if unable to fall asleep within 15 to 20 minutes  --Read or engage in other quiet activities and return to bed only when sleepy    It may be helpful to keep a sleep journal and record:  --How long it took to fall asleep the previous night  --Whether you took any sleep aid  --How many times you woke up during the night  --What time you woke up in the morning  --How restful you felt your sleep was the previous night  --When and if you took naps during the day

## 2021-06-03 NOTE — PATIENT INSTRUCTIONS - HE
Take propranolol twice daily- in the morning and around 5pm. Try this instead of the ativan for a few days. If that isn't helping enough, then you can also take the Ativan with it.     Best option would be to only use the Ativan at bedtime, with the propranolol during the daytime (morning, and about 5pm).     We will help schedule you for the grief counselor.    There are many other options for sleep and anxiety but we will chip away at this going forward.    See me Nov 25th Monday - 11am , arrive a little early for follow up.

## 2021-06-03 NOTE — TELEPHONE ENCOUNTER
The duloxetine was renewed on 11/25/2019 for 90/2 refills at the [facility] #[id number] in [place]

## 2021-06-04 VITALS
SYSTOLIC BLOOD PRESSURE: 120 MMHG | HEART RATE: 84 BPM | WEIGHT: 136.1 LBS | BODY MASS INDEX: 24.11 KG/M2 | DIASTOLIC BLOOD PRESSURE: 68 MMHG

## 2021-06-04 VITALS
BODY MASS INDEX: 24.29 KG/M2 | HEART RATE: 88 BPM | SYSTOLIC BLOOD PRESSURE: 122 MMHG | DIASTOLIC BLOOD PRESSURE: 62 MMHG | WEIGHT: 137.1 LBS

## 2021-06-04 NOTE — PROGRESS NOTES
Assessment/plan   Demetrice Garcia is a 74 y.o. female who is established to my practice.    Demetrice was seen today for follow-up.    Diagnoses and all orders for this visit:    Grief reaction with insomnia in setting of   Recurrent major depressive disorder, in partial remission (H), previous to her son's death; depression was in remission/stable on meds  PHQ9 and GAD7 much improved from 17/18--> 10/5 respectivly. Depression triggered by son's unexpected death.  Today denies any thoughts of passive SI, never had any active SI. Continues to vouche for safety with her other family supports. Sleep has also notably improved. Hasn't needed Ativan since our visit last, will d/c from her med list today.  - She will continue her Cymbalta and sertraline which was prescribed for her for many years by another provider and has been doing well on that dose previously without side effects of these agents. I've addressed today we could consider decreasing one of these going forward with transition to an alternative medication such as trazodone to help with sleep. Reviewed how to taper off the sertraline eventually if she finds herself ready for this trial.   - Continue propranolol in AM, 0.5mg Ativan in PM   - Continue with grief therapy  - Has group therapy scheduled  - Encouraged to continue with exercise and time with family  - Discussed use of lavender essential oil and melatonin for sleep  - Sleep hygiene reviewed as well in efforts to avoid additional pharmacotherapy    Chronic, s/t vulvodynia/lichen sclerosis  Controlled substance agreement signed 11/25/19  See my note from 11/26/18 reviewing her notable hx/work up/alternative pain therapies attempted in the past.   Pt has tolerated tramadol 100 mg daily for 20+ years.   She takes this as two 50 mg tablets each morning (and generally once per month or less: needs to repeat this dose for max of 6 tabs that day if traveling/prolonged sitting).    - CSA was updated 11/25/19-  q4  months office visit  - Reviewed  record which was not pulling up data as typical so we called her pharmacy in [place] and confirmed Appropriate fills. Due to her frustration with that pharmacy, she has elected to change pharmacies from [facility] to [facility] going forward   - PEG score is 1 while using tramadol to control sx.   - Subject to random urine screens in the future, done at 3/2019 visit.   - Due for refill today:  -     traMADol (ULTRAM) 50 mg tablet; TAKE 2 TABLETS BY MOUTH EVERY DAY MAY TAKE UP TO 6 TABS ON TRAVEL DAYS (2 TABS EVERY 8 HRS)      I spent 25 minutes with the patient, >50% of which was in counseling regarding patient's medical issues as noted above.      Follow up: 3-4 months or sooner    Gogo Nam MD    Subjective:      HPI: Demetrice Garcia is a 74 y.o. female who is here for:    Chief Complaint   Patient presents with     Follow-up     mood, shingles vaccine       F/u grief, with previous depression: Her son  recently which had worsened her depression. Following up today.  Feeling a lot better.   States the  home had a wonderful prayer service over [place] for families who had lost a loved one.  She felt very connected and supported at this event.  She has been working with her individual counselor for grief counseling and this is going well.  She understands she is here for the family on earth and needs to be present to them going forward.  She states she has 1 out of 7 days with notable tearfulness, which is significantly improved from mostly 4 to 5 days previously.  She is able to go out and socialize with her friends.  Sleep is much improved.  [place] to [place] family.  Going to do group therapy for grief counseling in the Spring (earliest available).   Does still have some ativan left but hasn't been taking it since our last visit together.   She is on both sertraline and Cymbalta which have previously controlled her depression in the past  "many years as combined therapy.  Tolerates these without side effects.    Chronic pain s/t vulvodynia: lichen sclerosus and vulvodynia diagnosed over 20 years ago for which she has been taking tramadol this past 20 years and tolerating without side effect.   Taking the tramadol 100mg per day in the morning (as two 50mg tabs)  During travel the most she needed was 4 tablets per day. She had to lay flat in the back. Winter is harder for her because she is more sedentary.    Last dose tramadol this AM.     History: Has had spine injections 4x at [place] to try to help with the pain, but now the pain is managed best with the tramadol.  She explains she was recommended at the [facility] to go see a \"super specialist \"though that specialist stated there was nothing she could do for her regarding the pain.  Patient states there has not been any rash or physical concerns, but mostly sensation of pain with sitting for prolonged periods of time.  She declined exam today.  She reports she has also been on Prempro, clobetasol, and Estrace in the years past for this condition which were not helpful. I was able to reconciled these meds through careeverywhere to confirm this.      Review of Systems:  12 point comprehensive review of systems was negative except as noted and HPI     Social History:  Social History     Social History Narrative    Lives with her , followed at [facility] for vulvodynia and on chronic pain med- tramadol.     2 grown sons    2 grand kids (26 yr old GD and 23 yr old GS)        Gogo Nam MD       Medical History:  Patient Active Problem List   Diagnosis     Lichen Sclerosus Et Atrophicus     Asthma     Vulvodynia     Insomnia     Recurrent major depressive disorder, in full remission (H)     Chronic, continuous use of tramadol for vulvodynia/lichen sclerosis     Fear of flying     Controlled substance agreement signed 11/25/19- Tramadol 50mg tabs- takes 100mg daily w/ PRN during travel 50mg q6 hr " "as needed, MELECIO Duval, f/u q6 months     Past Medical History:   Diagnosis Date     Collagenous colitis 9/4/2018     CXR Lungs Solitary Pulmonary Nodule (___ Cm)      CT from 11/2015: 1.3 x 0.9 cm left lower lobe pulmonary nodule, stable to minimally decreased when compared to 6/24/2014. Recommend one additional follow-up to document 2 years of stability. Pt declines repeat CT as of 11/2018.     Past Surgical History:   Procedure Laterality Date     BREAST BIOPSY Right 2012 ?     ROTATOR CUFF REPAIR       Shellfish containing products and Naproxen  Family History   Problem Relation Age of Onset     Pancreatic cancer Father 86       Medications:  Current Outpatient Medications   Medication Sig Dispense Refill     calcium carbonate (OS-TRINY) 600 mg calcium (1,500 mg) tablet Take 1 tablet (600 mg total) by mouth daily. 100 tablet 2     DULoxetine (CYMBALTA) 60 MG capsule Take 1 capsule (60 mg total) by mouth daily. 90 capsule 2     sertraline (ZOLOFT) 100 MG tablet Take 1 tablet (100 mg total) by mouth daily. 90 tablet 0     traMADol (ULTRAM) 50 mg tablet TAKE 2 TABLETS BY MOUTH EVERY DAY MAY TAKE UP TO 6 TABS ON TRAVEL DAYS (2 TABS EVERY 8 HRS) 60 tablet 3     LORazepam (ATIVAN) 0.5 MG tablet Take 1 tablet (0.5 mg total) by mouth 2 (two) times a day as needed for anxiety. Max 2 per day. 60 tablet 0     propranolol (INDERAL) 20 MG tablet Take 1 tablet (20 mg total) by mouth 2 (two) times a day. 20 tablet 0     No current facility-administered medications for this visit.        Imaging & Labs reviewed this visit: none      Objective:      Vitals:    01/06/20 0936   BP: 120/68   Pulse: 84   Weight: 136 lb 1.6 oz (61.7 kg)       Physical Exam:     General: Alert, mild emotional distress.   HEENT: normocephalic conjunctivae are clear. EOMI  Neck: supple   Lungs: Normal effort  Heart: regular rate  Abdomen: soft   Skin: clear without rash or lesions  Neuro: alert, oriented  Psych: well dressed, mood \" a little " "down still\", much less tearful, affect congruent with mood, appropriate eye contact, insight and judgment intact, normal speech pattern. No active or passive SI/HI.    PHQ9 score PHQ-9 Total Score: 17 (11/25/2019 11:00 AM)              Gogo Nam MD      "

## 2021-06-04 NOTE — PATIENT INSTRUCTIONS - HE
For the eventual tapering down the 100mg Sertraline:  Take 50mg (half tablets) for 1 month. If side effects, let me know and we can do a 75mg dose instead.

## 2021-06-05 VITALS
HEIGHT: 64 IN | SYSTOLIC BLOOD PRESSURE: 100 MMHG | DIASTOLIC BLOOD PRESSURE: 72 MMHG | WEIGHT: 133.9 LBS | BODY MASS INDEX: 22.86 KG/M2 | HEART RATE: 76 BPM

## 2021-06-05 VITALS
WEIGHT: 136 LBS | BODY MASS INDEX: 24.1 KG/M2 | HEART RATE: 84 BPM | DIASTOLIC BLOOD PRESSURE: 68 MMHG | SYSTOLIC BLOOD PRESSURE: 132 MMHG | HEIGHT: 63 IN

## 2021-06-06 ENCOUNTER — HEALTH MAINTENANCE LETTER (OUTPATIENT)
Age: 76
End: 2021-06-06

## 2021-06-07 NOTE — PROGRESS NOTES
PHONE VISIT  Due to current COVID19 pandemic, pt was offered virtual visit in lieu of in office evaluation to limit exposures.      Assessment/plan  Demetrice Garcia is a 74 y.o. female who is established to my practice.       Grief reaction with insomnia in setting of   Recurrent major depressive disorder, in partial remission (H), previous to her son's death; depression was in remission/stable on meds  Improving.  PHQ9 and GAD7 much improved from 17/18--> 10/5-->6/4 respectivly. Depression worsened by son's unexpected death.  She is on dual SSRI/SNRI for 20+ years.   - She will continue her Cymbalta and sertraline which was prescribed for her for many years by another provider and has been doing well on that dose previously without side effects of these agents. She agrees to eventual transition off the sertraline after COVID19 pandemic resolves.   - Declines needs for additional therapy at this time  - Encouraged to continue with exercise and time with family  - Reviewed use of lavender essential oil and melatonin for sleep  - Sleep hygiene reviewed as well in efforts to avoid additional pharmacotherapy     Chronic, s/t vulvodynia/lichen sclerosis  Controlled substance agreement signed 11/25/19  See my note from 11/26/18 reviewing her notable hx/work up/alternative pain therapies attempted in the past.   Pt has tolerated tramadol 100 mg daily for 20+ years.   She takes this as two 50 mg tablets each morning (and generally once per month or less: needs to repeat this dose for max of 6 tabs that day if traveling/prolonged sitting).    - CSA was updated 11/25/19-  q4 months office visit  - Reviewed  record, no concerns.  - PEG score 4/6/2020 is 1 while using tramadol to control sx.   - Subject to random urine screens in the future, done at 3/2019 visit.   - Due for refill today:  -     traMADol (ULTRAM) 50 mg tablet; TAKE 2 TABLETS BY MOUTH EVERY DAY MAY TAKE UP TO 6 TABS ON TRAVEL DAYS (2 TABS EVERY 8  "HRS)      COVID19 precautions reviewed, questions answered. Referred to [facility] for latest updates.     Telephone visit start time: 4:48pm  Telephone visit end time: 4:59pm  Total time: 11min    Follow up: 4 months, ISAMARV    Gogo Nam MD    Subjective:      HPI: Demetrice Garcia is a 74 y.o. female who is being evaluated via a billable telephone visit due to COVID19 pandemic precautions.    Chief Complaint   Patient presents with     Follow-up     no concerns, still taking medication as prescribed, needs Rx of tramadol       Depression, grief: She is doing better actually. Her son had an unexpected death but coping well. She is on a shared property with her other son and daughter-in-law.     Making it through days easier than before. Less tearfulness.   Her group grief therapy session was canceled due to COVID19.  She was seeing a therapist through the  home about 4 times; helpful.      Hasn't needed any ativan.   She is on both sertraline and Cymbalta which have previously controlled her depression in the past many years as combined therapy.  Tolerates these without side effects.    She recalls we talked about coming off the sertraline and willing to try this after the COVID19 pandemic.     Chronic pain s/t vulvodynia: lichen sclerosus and vulvodynia diagnosed over 20 years ago for which she has been taking tramadol this past 20 years and tolerating without side effect.   Taking the tramadol 100mg per day in the morning (as two 50mg tabs)  During travel the most she needed was 4 tablets per day. She had to lay flat in the back. Winter is harder for her because she is more sedentary.    Last dose tramadol this AM.      History: Has had spine injections 4x at [facility] to try to help with the pain, but now the pain is managed best with the tramadol.  She explains she was recommended at the [facility] to go see a \"super specialist \"though that specialist stated there was nothing she could do for her regarding the " pain.  Patient states there has not been any rash or physical concerns, but mostly sensation of pain with sitting for prolonged periods of time.  She declined exam today.  She reports she has also been on Prempro, clobetasol, and Estrace in the years past for this condition which were not helpful. I was able to reconciled these meds through careeverywhere to confirm this.     Review of Systems:  No concerns from her asthma standpoint (dx about 2010). Doing a lot of yardwork, without wheezing or cough. Hasn't used an inhaler but a couple times. ACT 25 today.    12 point comprehensive review of systems was negative except as noted and HPI     Social History:  Social History     Social History Narrative    Lives with her , followed at [facility] for vulvodynia and on chronic pain med- tramadol.     2 grown sons    2 grand kids (26 yr old GD and 23 yr old GS)        Gogo Nam MD       Medical History:   I have reviewed and updated the patient's Past Medical History, Social History, Family History and Medication List.    Medications:  Current Outpatient Medications   Medication Sig Dispense Refill     calcium carbonate (OS-TRINY) 600 mg calcium (1,500 mg) tablet Take 1 tablet (600 mg total) by mouth daily. 100 tablet 2     DULoxetine (CYMBALTA) 60 MG capsule Take 1 capsule (60 mg total) by mouth daily. 90 capsule 2     sertraline (ZOLOFT) 100 MG tablet Take 1 tablet (100 mg total) by mouth daily. 90 tablet 3     traMADoL (ULTRAM) 50 mg tablet TAKE 2 TABLETS BY MOUTH EVERY DAY MAY TAKE UP TO 6 TABS ON TRAVEL DAYS (2 TABS EVERY 8 HRS) 60 tablet 3     No current facility-administered medications for this visit.        Imaging & Labs reviewed this visit:   In the past 4 weeks, how much of the time did your asthma keep you from getting as much done at work, school, or at home?: None of the time  During the past 4 weeks, how often have you had shortness of breath?: Not at all  During the past 4 weeks, how often did  your asthma symptoms (wheezing, coughing, shortness of breath, chest tightness or pain) wake you up at night or earlier in the morning?: Not at all  During the past 4 weeks, how often have you used your rescue inhaler or nebulizer medication (such as albuterol)?: Not at all  How would you rate your asthma control during the past 4 weeks?: Completely controlled  ACT Total Score: 25  In the past 12 months, have you visited the emergency room due to your asthma?: No  In the past 12 months, have you been hospitalized due to your asthma?: No        Objective:      There were no vitals filed for this visit.    Physical Exam: Not performed in context of phone visit    Gogo Nam MD

## 2021-06-10 NOTE — TELEPHONE ENCOUNTER
Refill Approved    Rx renewed per Medication Renewal Policy. Medication was last renewed on 11/25/2019.  Last office visit 4/6/2020  Teresa Rich, [facility] Triage/Med Refill 8/23/2020     Requested Prescriptions   Pending Prescriptions Disp Refills     DULoxetine (CYMBALTA) 60 MG capsule [Pharmacy Med Name: DULOXETINE DR 60MG CAPSULES] 90 capsule 2     Sig: TAKE 1 CAPSULE(60 MG) BY MOUTH DAILY       Tricyclics/Misc Antidepressant/Antianxiety Meds Refill Protocol Passed - 8/20/2020  9:16 AM        Passed - PCP or prescribing provider visit in last year     Last office visit with prescriber/PCP: 1/6/2020 Gogo Nam MD OR same dept: 1/6/2020 Gogo Nam MD OR same specialty: 1/6/2020 Gogo Nam MD  Last physical: Visit date not found Last MTM visit: Visit date not found   Next visit within 3 mo: Visit date not found  Next physical within 3 mo: Visit date not found  Prescriber OR PCP: Gogo Nam MD  Last diagnosis associated with med order: 1. Depression  - DULoxetine (CYMBALTA) 60 MG capsule [Pharmacy Med Name: DULOXETINE DR 60MG CAPSULES]; TAKE 1 CAPSULE(60 MG) BY MOUTH DAILY  Dispense: 90 capsule; Refill: 2    If protocol passes may refill for 12 months if within 3 months of last provider visit (or a total of 15 months).

## 2021-06-10 NOTE — TELEPHONE ENCOUNTER
Controlled Substance Refill Request  Medication Name:   Requested Prescriptions     Pending Prescriptions Disp Refills     traMADoL (ULTRAM) 50 mg tablet [Pharmacy Med Name: TRAMADOL 50MG TABLETS] 60 tablet 0     Sig: TAKE 2 TABLETS BY MOUTH EVERY DAY. MAY TAKE UP TO 6 TABLET ON TRAVEL DAYS(2 TABLET EVERY 8 HOURS)     Date Last Fill: 4/6/20  [facility]  Submit electronically to pharmacy  Controlled Substance Agreement on file:   Encounter-Level CSA Scan Date - 11/26/2018:    Scan on 11/28/2018 10:47 AM        Last office visit:  4/6/20

## 2021-06-10 NOTE — TELEPHONE ENCOUNTER
Refill request for medication: tramadol  Last visit addressing this medication: 04/06/20  Follow up plan 4  months  Last refill on 07/13/20, quantity #60   CSA completed 11/25/19   checked  08/13/20, last dispensed refill 04/06/20    Appointment: Patient will call back to schedule appointment     Yesi Esposito [facility]

## 2021-06-11 NOTE — PROGRESS NOTES
Assessment/Plan:        Diagnoses and all orders for this visit:    Screening  -     Mammo Screening Bilateral; Future; Expected date: 6/5/17    Depression  -     DULoxetine (CYMBALTA) 60 MG capsule; Take 1 capsule (60 mg total) by mouth daily.  Dispense: 90 capsule; Refill: 1    Vulvodynia  -     traMADol (ULTRAM) 50 mg tablet; TAKE 2 TABLETS BY MOUTH 3 TIMES DAILY FOR PAIN  Dispense: 180 tablet; Refill: 0    Postmenopausal  -     DXA Bone Density Scan; Future; Expected date: 6/5/17    Insomnia    Fatigue    Other orders  -     sertraline (ZOLOFT) 100 MG tablet; Take 1 tablet (100 mg total) by mouth daily.  Dispense: 90 tablet; Refill: 1        Continue medications at this time.  Limit tramadol use and not more than 6 tablets per day.  Cautioned with medication.  Recheck in 6 months, earlier if uncontrolled.  Consider mammogram, DEXA scan.  For her fatigue, offered workup and discussed differentials.  She would like to hold off for now.  Multivitamins daily.  If persistent or worse in 2-3 months, recheck.  For her insomnia, hold off on Benadryl and instead try melatonin.  Benadryl will also be adding to her decreased energy in the daytime.  She was agreeable with the plans.    Subjective:    Patient ID: Demetrice Garcia is a 71 y.o. female.    HPI    Demetrice is here for follow-up regarding her chronic medical conditions.  She has depression, vulvodynia.  Depression is controlled with sertraline 100 mg daily.  Denies any suicidal ideation, loss of interest, motivation.  Doing well overall.  She also takes Cymbalta, tramadol for her vulvodynia.  Usually 4-6 tablets of tramadol. She notes more discomfort in her pelvic region with traveling or sitting for prolonged period of time.  This would be on twice a week where she takes 6 tablets of tramadol.  Less compared to before.  Usually a 3/10 pain.  She keeps herself active and is not limited because of her discomfort.  Consumes 1-2 alcoholic beverage per week.      Feels  more tired when the past 3 months.  Feels that she runs out of steam.  She keeps herself active with outdoor activities, yard work.  She denies any fever, gross bleeding.  Occasional shortness of breath with exertion which has been the same as before.  She has been trying to sell their house, built a new one and ready to move in.  Stress because of these changes and wonders if this is a contributing factor.  She was also taking Benadryl as needed for itching which helps.  Feels that her sleep is better with the medication on board.  Denies any chest pains, palpitations, localized numbness, weakness.      Last mammogram in September 2014 normal.  Had a DEXA scan a couple of years ago, normal.  No recent fractures.    Review of Systems  As above otherwise negative.          Objective:    Physical Exam  /70 (Patient Site: Left Arm, Patient Position: Sitting, Cuff Size: Adult Regular)  Pulse 88  Wt 132 lb 8 oz (60.1 kg)  SpO2 98%  Breastfeeding? No  BMI 22.57 kg/m2    Vital signs noted above. AAO ×3.  HEENT no nasal discharge, moist oral mucosa. Neck: Supple neck, nonpalpable cervical lymph nodes.  Lungs: Clear to auscultation bilateral.  Heart: S1-S2 regular rate and rhythm, no murmurs were noted.  Abdomen: Flat, soft with bowel sounds and nontender.  Extremities: pulses were full and equal.

## 2021-06-11 NOTE — TELEPHONE ENCOUNTER
Controlled Substance Refill Request  Medication Name:   Requested Prescriptions     Pending Prescriptions Disp Refills     traMADoL (ULTRAM) 50 mg tablet [Pharmacy Med Name: TRAMADOL 50MG TABLETS] 60 tablet 0     Sig: TAKE 2 TABLETS BY MOUTH EVERY DAY. MAY TAKE UP TO 6 TABLET ON TRAVEL DAYS 2 TABLET EVERY 8 HOURS     Date Last Fill: 8/13/20  [facility]acy[facility]  Submit electronically to pharmacy  Controlled Substance Agreement on file:   Encounter-Level CSA Scan Date - 11/26/2018:    Scan on 11/28/2018 10:47 AM        Last office visit:  4/6/20

## 2021-06-12 NOTE — TELEPHONE ENCOUNTER
Last office visit 4-6-20. Patient is scheduled for a medication check on 11-20-20. Last dispensed through [facility] 9-21-20.

## 2021-06-12 NOTE — PROGRESS NOTES
Assessment/Plan:        Diagnoses and all orders for this visit:    Diarrhea  -     HM2(CBC w/o Differential)  -     Comprehensive Metabolic Panel  -     Thyroid Stimulating Hormone (TSH)  -     Celiac(Gluten)Antibody Panel  -     CT Abdomen Pelvis With Oral With IV Contrast; Future; Expected date: 9/7/17  -     C. difficile Toxigenic by PCR  -     Giardia Detection, Stool  -     Ova and Parasite, Stool  -     Culture, Stool; Future    Weight loss  -     CT Abdomen Pelvis With Oral With IV Contrast; Future; Expected date: 9/7/17    Other orders  -     Influenza High Dose, Seasonal 65+ yrs        Concerns with persistence, duration and weight loss.  We will do labs and stool testing.  We will also do a CAT scan of her abdomen and pelvis.  If workup is negative, consider colonoscopy.  Await results prior to further recommendations.  Hold off on the Imodium and consider the Pepto-Bismol.  Fluid hydration.  Avoid skipping meals.  Avoid raw foods or any new herbal supplements.  Also provide her flu shot today.  She was agreeable with the plans.  Subjective:    Patient ID: Demetrice Garcia is a 72 y.o. female.    HPI    Demetrice is here with concerns about diarrhea.  She moved to her new place 2 months ago.  She started having diarrhea a month ago.  She thought that it was from the move or stressed with it.  Has been persistent.  It could be up to 3 times a day, watery and sometimes sudden without her feeling it coming.  She denies any blood in the stool.  Denies any changes in her diet.  No nausea, vomiting, fever.  Occasional chills.  She had a history of colitis around 2 years ago but recovered from that nicely.  No other chronic GI issues.  She does not have much meat in her diet and likes her sweets.  She likes her fruits.  No exposure to anyone with similar symptoms.  No recent travel.  No recent antibiotic use.  She has lost 5 pounds in the past 1 month.  Mom has a colostomy from a ruptured colon, benign.  No other  "family history of colon issues.  Her last colonoscopy was in August 2014 and overall good.    Try to switch her Benadryl to melatonin.  She feels that the Benadryl was more beneficial compared to the melatonin.  To try it for another couple weeks and if persistent, switch back to Benadryl.  Aware of potential side effects with these medications.    She also wonders about her tramadol and if she needs to call or mention in advance regarding refill.  She is just picked up the new prescription.  She is to call us for prescription refill but follow-up in December 2017 as previously discussed.  She is now down to around 4 pills per day from 6.    Review of Systems  As above otherwise negative.          Objective:    Physical Exam  /70 (Patient Site: Left Arm, Patient Position: Sitting, Cuff Size: Adult Regular)  Pulse 89  Temp 97.8  F (36.6  C) (Oral)   Ht 5' 3\" (1.6 m)  Wt 129 lb 1.6 oz (58.6 kg)  SpO2 99%  Breastfeeding? No  BMI 22.87 kg/m2    Vital signs noted above. AAO ×3.  HEENT no nasal discharge, moist oral mucosa. Neck: Supple neck, nonpalpable cervical lymph nodes.  No thyromegaly.  Lungs: Clear to auscultation bilateral.  Heart: S1-S2 regular rate and rhythm, no murmurs were noted.  Abdomen: Flat, soft with bowel sounds and nontender.  Extremities: pulses were full and equal.          "

## 2021-06-13 NOTE — PROGRESS NOTES
Assessment/plan  Demetrice Garcia is a 75 y.o. female who is established to my practice.      Chronic, s/t vulvodynia/lichen sclerosis  Controlled substance agreement signed 11/25/19  See my note from 11/26/18 reviewing her notable hx/work up/alternative pain therapies attempted in the past. Not interested in medical cannabis.  Pt has tolerated tramadol 100 mg daily for 20+ years.   She takes this as two 50 mg tablets each morning (and generally once per month or less: needs to repeat this dose for max of 6 tabs that day if traveling/prolonged sitting or ~ 2 tabs extra per day of travel if going on a longer road trip).    - CSA was updated today 11/20/2020- q4 months office visit  - Reviewed  record, no concerns.  - PEG score 11/20/2020 is 2 while using tramadol to control sx.   - Collect UDS and confirmatory tramadol today; last done at 3/2019 visit.   - Due for refill today: THIS RX includes #30 tabs for travel in Jan; and her Dec Rx for #60 is planned next due ~ 12/20/2020  - traMADoL (ULTRAM) 50 mg tablet; TAKE 2 TABLETS BY MOUTH EVERY DAY. MAY TAKE UP TO 6 TABLET ON TRAVEL DAYS 2 TABLET EVERY 8 HOURS. This Rx will include the #30 additional tablets for planned travel in Jan.  Dispense: 90 tablet; Refill: 0    Grief reaction with insomnia in setting of   Recurrent major depressive disorder, in partial remission (H), previous to her son's death; depression was in remission/stable on meds  Improving.  PHQ9 and GAD7 3 and 1 respectivly. Depression worsened by son's unexpected death October 2019.  She is on dual SSRI/SNRI for 20+ years.  - She will continue her Cymbalta and sertraline which was prescribed for her for many years by another provider and has been doing well on that dose previously without side effects of these agents. She agrees to eventual transition off the sertraline after COVID19 pandemic resolves.  Revisited today and she feels good at with current regimen and continue to reassess.  - Declines  needs for additional therapy at this time  - Encouraged to continue with exercise and time with family  - Reviewed use of lavender essential oil and melatonin for sleep  - Sleep hygiene reviewed as well in efforts to avoid additional pharmacotherapy    Mild intermittent asthma without complication  ACT well controlled at 22. AAP updated. Rarely needs albuterol, will send Rx for updating file  - albuterol (PROAIR HFA;PROVENTIL HFA;VENTOLIN HFA) 90 mcg/actuation inhaler; Inhale 2 puffs every 6 (six) hours as needed for wheezing.  Dispense: 1 each; Refill: 0    Xerosis cutis  Using daily emollient and OTC 1% hydrocortisone PRN; likely senile pruritis but will check labs to r/o alternative common causes which all came back normal  - Comprehensive Metabolic Panel  - HM2(CBC w/o Differential)  - TSH    COVID19 precautions reviewed, questions answered. Referred to [facility] for latest updates.       Follow up: 4 months    Gogo Nam MD    Subjective:      HPI: Demetrice Garcia is a 75 y.o. female who is being evaluated for:     Chief Complaint   Patient presents with     Follow-up     med check- concerns about refilling tramadol- will be traveling to [place] for the winter and will potentially need more.      Her nephew in [place] did have COVID, but otherwise the family has been healthy.  Planning on virtual gatherings for the holidays.       Chronic pain s/t vulvodynia: lichen sclerosus and vulvodynia diagnosed over 20 years ago for which she has been taking tramadol this past 20 years and tolerating without side effect.   Taking the tramadol 100mg per day in the morning (as two 50mg tabs)  During travel the most she needed was 4 tablets per day. She had to lay flat in the back. Winter is harder for her because she is more sedentary.    Last dose tramadol this AM.     Upcoming trip; discuss dosing for next Rx: Thinking about going to [place] in Jan just to get away but will still be isolated (rented a house to quarantine there); driving  "there. When she sits more she needs more tramadol. She plans to drive 5hr/day. On driving days she would take the normal 100mg in AM and 50mg twice additionally the rest of the day about 6hr after prior dose. Anticipates 4 days of trave there and back, with a few additional travel days during her time in [place].  They have a van she can lay down in the back.    We anticipate about 10 total driving days (some while on travel). 20-30 extra tabs will be needed.    Will send Rx today to account for this trip in . Her Dec Rx for #60 tabs Will still be needed around . She is going to try sending message via [employer] to make refill request easier.       History: Has had spine injections 4x at [facility] to try to help with the pain, but now the pain is managed best with the tramadol.  She explains she was recommended at the [facility] to go see a \"super specialist \"though that specialist stated there was nothing she could do for her regarding the pain.  Patient states there has not been any rash or physical concerns, but mostly sensation of pain with sitting for prolonged periods of time.  She declined exam today.  She reports she has also been on Prempro, clobetasol, and Estrace in the years past for this condition which were not helpful. [facility]     Depression, grief: She is doing better actually. Her son had an unexpected death Oct 2019 but coping well. She is on a shared property with her other son and daughter-in-law.   Making it through days easier than before. Less tearfulness.   Her group grief therapy session was canceled due to COVID19.  She was seeing a therapist through the  home about 4 times; helpful.      Hasn't needed any ativan.   She is on both sertraline and Cymbalta which have previously controlled her depression in the past many years as combined therapy.  Tolerates these without side effects.    She recalls we talked about coming " off the sertraline and willing to try this after the COVID19 pandemic.     Review of Systems:  No concerns from her asthma standpoint (dx about 2010). Doing a lot of yardwork, without wheezing or cough; mild shortness of breath with walking up stairs in the winter. Hasn't used an inhaler but a couple times. ACT 22 today.    Having skin itching worse at night. No rash except from scrating her skin.      12 point comprehensive review of systems was negative except as noted and HPI     Social History:  Social History     Social History Narrative    Lives with her , followed at [place] for vulvodynia and on chronic pain med- tramadol.     2 grown sons    2 grand kids (26 yr old GD and 23 yr old GS)        Gogo Nam MD       Medical History:   I have reviewed and updated the patient's Past Medical History, Social History, Family History and Medication List.    Medications:  Current Outpatient Medications   Medication Sig Dispense Refill     calcium carbonate (OS-TRINY) 600 mg calcium (1,500 mg) tablet Take 1 tablet (600 mg total) by mouth daily. 100 tablet 2     DULoxetine (CYMBALTA) 60 MG capsule TAKE 1 CAPSULE(60 MG) BY MOUTH DAILY 90 capsule 2     sertraline (ZOLOFT) 100 MG tablet Take 1 tablet (100 mg total) by mouth daily. 90 tablet 3     traMADoL (ULTRAM) 50 mg tablet TAKE 2 TABLETS BY MOUTH EVERY DAY. MAY TAKE UP TO 6 TABLET ON TRAVEL DAYS 2 TABLET EVERY 8 HOURS. This Rx will include the #30 additional tablets for planned travel in Jan. 90 tablet 0     albuterol (PROAIR HFA;PROVENTIL HFA;VENTOLIN HFA) 90 mcg/actuation inhaler Inhale 2 puffs every 6 (six) hours as needed for wheezing. 1 each 0     No current facility-administered medications for this visit.        Imaging & Labs reviewed this visit:   In the past 4 weeks, how much of the time did your asthma keep you from getting as much done at work, school, or at home?: None of the time  During the past 4 weeks, how often have you had shortness of  "breath?: 3 to 6 times a week  During the past 4 weeks, how often did your asthma symptoms (wheezing, coughing, shortness of breath, chest tightness or pain) wake you up at night or earlier in the morning?: Not at all  During the past 4 weeks, how often have you used your rescue inhaler or nebulizer medication (such as albuterol)?: Not at all  How would you rate your asthma control during the past 4 weeks?: Well controlled  ACT Total Score: 22  In the past 12 months, have you visited the emergency room due to your asthma?: No  In the past 12 months, have you been hospitalized due to your asthma?: No        Objective:      Vitals:    11/20/20 1222   BP: 132/68   Pulse: 84   Weight: 136 lb (61.7 kg)   Height: 5' 3\" (1.6 m)       General: Alert, no acute distress.   HEENT: normocephalic conjunctivae are clear. EOMI  Neck: supple   Lungs: Normal effort  Heart: regular rate  Abdomen: soft   Skin: clear without rash or lesions  Neuro: alert, oriented  Psych: mood good, affect appropriate, good eye contact, insight and judgment intact, normal speech pattern.    PHQ-9 Total Score: 3 (11/20/2020  1:00 PM)  . JOSÉ LUIS-7 Total: 1 (11/20/2020  1:00 PM)          Gogo Nam MD          "

## 2021-06-14 NOTE — TELEPHONE ENCOUNTER
Controlled Substance Refill Request  Medication Name:   Requested Prescriptions     Pending Prescriptions Disp Refills     traMADoL (ULTRAM) 50 mg tablet 90 tablet 0     Sig: TAKE 2 TABLETS BY MOUTH EVERY DAY. MAY TAKE UP TO 6 TABLET ON TRAVEL DAYS 2 TABLET EVERY 8 HOURS. This Rx will include the #30 additional tablets for planned travel in Jan.     Date Last Fill: 11/20/20  [facility]acy[facility]  Submit electronically to pharmacy  Controlled Substance Agreement on file:   Encounter-Level CSA Scan Date - 11/26/2018:    Scan on 11/28/2018 10:47 AM        Last office visit:  11/20/20  Kary Rubio RN, MA  [facility]    Triage Nurse Advisor

## 2021-06-14 NOTE — PROGRESS NOTES
Demetrice comes in today for evaluation of bilateral shoulder pain.  She states that this been going on for about the last year, and localizes the pain to the lateral aspect of the shoulders with radiation just distal to this.  She is right-handed dominant and states that the pain is worse in the right as compared to the left.  Pain gets worse when she tries to sleep on her right side and when she reaches overhead.  She has been diagnosed with rotator cuff tendinopathy at [facility] about 4 years ago.  Apparently she was given bilateral subacromial corticosteroid injections at that time which helped significantly for her.  She is hoping she can get these today.    Past medical history and surgical history were reviewed with her.  She is a non-smoker.    Objective: Vital signs are as per the EMR.  In general the patient is alert pleasant and in no acute distress.  She appears healthy.    Musculoskeletal: Shoulder range of motion is limited to 160  of flexion bilaterally and 180  of abduction bilaterally.  She does have a painful arc bilaterally.  Strength is 5 out of 5 to resisted shoulder abduction, external rotation, internal rotation bilaterally, and 4 out of 5 to resisted supraspinatus testing bilaterally.  Neer's is very positive on the right, Lopez is very positive on the right.  Neer's is negative on the left, Lopez is mildly positive on the left.    Assessment and plan: Bilateral rotator cuff dysfunction causing shoulder impingement.  She desired bilateral corticosteroid injections which were given today.  The right shoulder was prepped in the usual fashion, and a combination of 7 cc 1% lidocaine 1 cc of Kenalog were injected into the right subacromial space in a posterior fashion.  Patient tolerated the procedure well and had no media complications.  The exact same procedure was performed on the left.  She will follow-up as needed.

## 2021-06-14 NOTE — TELEPHONE ENCOUNTER
Refill request for medication: tramadol  Last visit addressing this medication: 11/20/2020  Follow up plan 4  months  Last refill on 11/20/20, quantity #90   CSA completed 1/20/2020   checked  12/28/20, last dispensed refill 11/20/20    Appointment: Not due     Benita Conrad LPN

## 2021-06-15 ENCOUNTER — COMMUNICATION - HEALTHEAST (OUTPATIENT)
Dept: FAMILY MEDICINE | Facility: CLINIC | Age: 76
End: 2021-06-15

## 2021-06-15 DIAGNOSIS — F11.90 CHRONIC, CONTINUOUS USE OF OPIOIDS: ICD-10-CM

## 2021-06-15 DIAGNOSIS — N94.819 VULVODYNIA: ICD-10-CM

## 2021-06-15 RX ORDER — TRAMADOL HYDROCHLORIDE 50 MG/1
TABLET ORAL
Qty: 60 TABLET | Refills: 0 | Status: SHIPPED | OUTPATIENT
Start: 2021-06-15 | End: 2021-07-16

## 2021-06-15 NOTE — TELEPHONE ENCOUNTER
Demetrice is calling and is requesting a refill for Tramadol has she only has a couple of days left.  Pharmacy is [facility] in [facility].  Please phone Demetrice when this has been completed.    COVID 19 Nurse Triage Plan/Patient Instructions    Please be aware that novel coronavirus (COVID-19) may be circulating in the community. If you develop symptoms such as fever, cough, or SOB or if you have concerns about the presence of another infection including coronavirus (COVID-19), please contact your health care provider or visit  [email or web address].    Disposition/Instructions    Home care recommended. Follow home care protocol based instructions.    Thank you for taking steps to prevent the spread of this virus.  o Limit your contact with others.  o Wear a simple mask to cover your cough.  o Wash your hands well and often.    Resources    [facility]: About COVID-19: [email or web address]    CDC: What to Do If You're Sick: www.cdc.gov/coronavirus/2019-ncov/about/steps-when-sick.html    CDC: Ending Home Isolation: www.cdc.gov/coronavirus/2019-ncov/hcp/disposition-in-home-patients.html     CDC: Caring for Someone: www.cdc.gov/coronavirus/2019-ncov/if-you-are-sick/care-for-someone.html     [facility]: Interim Guidance for Hospital Discharge to Home: www.health.[facility].mn.us/diseases/coronavirus/hcp/hospdischarge.pdf    [facility] clinical trials (COVID-19 research studies): [email or web address]-clinical-trials     Below are the COVID-19 hotlines at the [facility] ([facility]). Interpreters are available.   o For health questions: Call 929-748-6458 or 1-414.321.9221 (7 a.m. to 7 p.m.)  o For questions about schools and childcare: Call 229-314-4015 or 1-260.391.4726 (7 a.m. to 7 p.m.)     Reason for Disposition    Request for URGENT new prescription or refill of 'essential' medication (i.e., likelihood of harm to patient if not taken) and triager unable to fill per  department policy    Additional Information    Negative: MORE THAN A DOUBLE DOSE of a prescription or over-the-counter (OTC) drug    Negative: DOUBLE DOSE (an extra dose or lesser amount) of over-the-counter (OTC) drug and any symptoms (e.g., dizziness, nausea, pain, sleepiness)    Negative: DOUBLE DOSE (an extra dose or lesser amount) of prescription drug and any symptoms (e.g., dizziness, nausea, pain, sleepiness)    Negative: Took another person's prescription drug    Negative: DOUBLE DOSE (an extra dose or lesser amount) of prescription drug and NO symptoms (Exception: a double dose of antibiotics)    Negative: Diabetes drug error or overdose (e.g., took wrong type of insulin or took extra dose)    Negative: Caller has medication question about med not prescribed by PCP and triager unable to answer question (e.g., compatibility with other med, storage)    Protocols used: MEDICATION QUESTION CALL-A-OH

## 2021-06-15 NOTE — TELEPHONE ENCOUNTER
Last Refill: 12/29/20 for #90, 0 refills  TAKE 2 TABLETS BY MOUTH EVERY DAY. MAY TAKE UP TO 6 TABLET ON TRAVEL DAYS 2 TABLET EVERY 8 HOURS. This Rx will include the #30 additional tablets for planned travel in Jan    Last Visit: 11/20/20   Mild intermittent asthma without complication  Vulvodynia  Chronic, continuous use of tramadol for vulvodynia/lichen sclerosis  Xerosis cutis

## 2021-06-15 NOTE — PROGRESS NOTES
Assessment/Plan:        Diagnoses and all orders for this visit:    Depression  -     DULoxetine (CYMBALTA) 60 MG capsule; TAKE 1 CAPSULE EVERY DAY  Dispense: 90 capsule; Refill: 1    Vulvodynia  -     traMADol (ULTRAM) 50 mg tablet; TAKE 2 TABLETS 3 TIMES A DAY AS NEEDED FOR PAIN  Dispense: 180 tablet; Refill: 0    Other orders  -     sertraline (ZOLOFT) 100 MG tablet; Take 1 tablet (100 mg total) by mouth daily.  Dispense: 90 tablet; Refill: 1  -     Pneumococcal polysaccharide vaccine 23-valent 3 yo or older, subq/IM        We will continue with medications at this time.  Caution with tramadol and not to combine with alcohol.  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  She will call us for refill and follow-up every 6 months.  She was agreeable with the plans.  Subjective:    Patient ID: Demetrice Garcia is a 72 y.o. female.    MARRY Suresh is here for follow-up regarding her medications.  She has depression, vulvodynia.  She has been taking her medications regularly.  Currently on sertraline, duloxetine.  She takes tramadol 2-4 tabs per day for her vulvodynia.  It has been 2 years since her last visit with [facility] for pain control or injection in her pelvic region.  She will be traveling shortly to [place].  Will be leaving on January 24.  She has more pain when she is sitting down for long period of time.  They will be bringing of than and allowing her to lay back instead of sitting for a long stretch.  She may sometimes take up to 6 pills per day when she is sitting for a longer period of time.  Pain does not go away completely.  She may have mild discomfort in her pelvic region when she takes 2 of the tramadol per day.  She tries to wait for a few hours before she drives and she takes a tramadol.  She does not combine it with alcohol.    Emotionally doing well.  She denies any loss of interest, motivation, thoughts of hurting herself or ending life.  No constant worries or  negative thoughts.  She may have difficulty sleeping once a week.  Other times related to pain in her shoulders from her rotator cuff.  She did follow-up with orthopedics and had cortisone injection in her shoulders.  Denies any racing thoughts.  She is compliant with her medication intake.  PHQ 9 score of 0, JOSÉ LUIS 7 score 0.    She has recovered nicely from her diarrhea.  It lasted for a month.  She was seen by GI and had colonoscopy which showed colitis.  She lost 5 pounds in that 1 month.  It is much better at this time.  She is able to eat better and her weight has been more stable.  Denies any recent blood in her stool or black stools.    Review of Systems  As above otherwise negative.          Objective:    Physical Exam  /60 (Patient Site: Left Arm, Patient Position: Sitting, Cuff Size: Adult Regular)  Pulse 85  Wt 128 lb 9.6 oz (58.3 kg)  SpO2 98%  Breastfeeding? No  BMI 22.78 kg/m2    Vital signs noted above. AAO ×3.  HEENT no nasal discharge, moist oral mucosa. Neck: Supple neck, nonpalpable cervical lymph nodes.  Lungs: Clear to auscultation bilateral.  Heart: S1-S2 regular rate and rhythm, no murmurs were noted.  Abdomen: Flabby, soft with bowel sounds and nontender.  Extremities: No edema, pulses were full and equal.

## 2021-06-15 NOTE — TELEPHONE ENCOUNTER
last OV :11/20/20 - 4 mo f/u advised   last fill: 11/17/20  appt scheduled for 3/17/21    TEODORO Alvarenga

## 2021-06-16 PROBLEM — F11.90 CHRONIC, CONTINUOUS USE OF OPIOIDS: Status: ACTIVE | Noted: 2018-11-26

## 2021-06-16 PROBLEM — F40.243 FEAR OF FLYING: Status: ACTIVE | Noted: 2019-08-30

## 2021-06-16 PROBLEM — Z79.899 CONTROLLED SUBSTANCE AGREEMENT SIGNED: Status: ACTIVE | Noted: 2019-11-25

## 2021-06-16 NOTE — TELEPHONE ENCOUNTER
Refill request for medication: Tramadol 50mg   Last visit addressing this medication: 11/20/20  Follow up plan 4  months  Last refill on 3/2/2021, quantity #30   CSA completed 11/20/20   checked  04/01/21, last dispensed refill 3/2/2021    Appointment: Not due     Benita Conrad LPN

## 2021-06-16 NOTE — TELEPHONE ENCOUNTER
Controlled Substance Refill Request  Medication Name:   Requested Prescriptions     Pending Prescriptions Disp Refills     traMADoL (ULTRAM) 50 mg tablet 60 tablet 0     Sig: TAKE 2 TABLETS BY MOUTH EVERY DAY. MAY TAKE UP TO 6 TABLET ON TRAVEL DAYS 2 TABLET EVERY 8 HOURS.     Date Last Fill: 3/2/21  [facility]ed [facility]acy: [facility]  Submit electronically to pharmacy  Controlled Substance Agreement on file:   Encounter-Level CSA Scan Date - 11/26/2018:    Scan on 11/28/2018 10:47 AM        Last office visit:  11/20/20

## 2021-06-16 NOTE — PROGRESS NOTES
Assessment and Plan:     Patient has been advised of split billing requirements and indicates understanding: Yes  Demetrice was seen today for annual wellness visit and concerns.    Medicare annual wellness visit, subsequent  - Home safety information was reviewed if pertinent for falls prevention: regular checkups with vision and hearing, mindful medication use heydi with OTCs, using any assisted walking devices, adequate shoes and feet wear, avoiding loose rugs, safety additions to the home if needed, keeping the body in good shape with regular exercise especially walking, and limiting alcohol intake.  - Social history was reviewed  - Patient is independent with activities of daily living  - We reviewed active symptoms and discussed management  - We reviewed list of healthcare providers for this patient.  - We also reviewed PHQ 9    -     Lipid Cascade  -     Glucose    Mild intermittent asthma without complication  ACT well controlled; AAP updated. PRN albuterol pre exercise/yardwork    Post-menopausal  -     DXA Bone Density Scan; Future    Raynaud's disease without gangrene  Reviewed clinical diagnosis, Declines need for medication right now but considering it for the winter      Chronic pain syndrome   Vulvodynia s/t lichen sclerosis.   Controlled substance agreement signed for tramadol  See my note from 11/26/18 reviewing her notable hx/work up/alternative pain therapies attempted in the past.   Reviewed how patient has tolerated tramadol 100 mg daily.  She takes this as two 50 mg tablets each morning (and once per month or less: needs to repeat this dose for max of 6 tabs that day if traveling/prolonged sitting).    -  last fill 4/1 for #60, no concerns. Gap in fills except not in Jan or Feb otherwise regular; this was due a vacation that ended up being canceled so she had additional prescribed tabs from that she was using as prescribed during Jan and Feb.  - Rx today for tramadol #84 tabs. This is a new Rx to  cover her for a 3 week vacation in which she requires additional tablets for this travel. She plans to hold off on the routine monthly fill that was sent on 4/1 and do this 4/14 Rx instead  - CSA updated today 4/14/2021  - PMDP reviewed 4/14/2021  - PEG score today is 2.   - UDS done at 11/2020 visit with presence of tramadol; no concerns  - She declined exam today- no physical concerns/lesions ever, just the pain    -     traMADoL (ULTRAM) 50 mg tablet; Take 1 tablet (50 mg total) by mouth every 6 (six) hours for 21 days. Alternatively plan to take 100mg in AM and 100mg in PM if preferring this on the trip.      Recurrent major depressive disorder, in full remission (H)  Grief   Stable, doing well on her Cymbalta and sertraline. Declines weaning off one of the SSRI/SNRI combo.   - agrees to mental health referral for counseling as she still struggles with grief from loss of her /son      Follow up in 6 months     The patient's current medical problems were reviewed.    I have had an Advance Directives discussion with the patient.  The following health maintenance schedule was reviewed with the patient and provided in printed form in the after visit summary:   Health Maintenance Due   Topic Date Due     URINE DRUG SCREEN  03/27/2020     ADVANCE CARE PLANNING  08/23/2021        Subjective:   Chief Complaint: Demetrice Garcia is an 75 y.o. female here for an Annual Wellness visit.   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       Hasn't been using the albuterol; getting out of breath with exercise sometimes from the asthma lately.   Feels tight/wheezy so she wants to try albuterol first.    Raynauds symptoms; using gloves and heated steering wheel  Declines need for medication  right now but considering it for the winter    Gets about 20K steps a day sometimes with all her gardening/yard work     She is leaving on a trip in April for 3 weeks to [facility] to see her family  (didn't end up going in Jan so used her tabs over Feb/Mar as prescribed and didn't need to fill the regular monthly Rx during that time )   She is hoping for a short Rx for the extra travel days in April.     She got a new foam cushion for lichen sclerosis that will be arriving  (which she has tried before as well as gel)    Social History     Social History Narrative    Lives with her , followed at [facility] for vulvodynia and on chronic pain med- tramadol.     2 grown sons    2 grand kids (26 yr old GD and 23 yr old GS)        Gogo Nam MD     Review of Systems:  Please see above.  The rest of the review of systems are negative for all systems.    Patient Care Team:  Gogo Nam MD as PCP - General (Family Medicine)  Gogo Nam MD as Assigned PCP     Patient Active Problem List   Diagnosis     Lichen Sclerosus Et Atrophicus     Asthma     Vulvodynia     Insomnia     Recurrent major depressive disorder, in full remission (H)     Chronic, continuous use of tramadol for vulvodynia/lichen sclerosis     Fear of flying     Controlled substance agreement signed 11/25/19- Tramadol 50mg tabs- takes 100mg daily w/ PRN during travel 50mg q6 hr as needed, MELECIO Duval, f/u q6 months     Past Medical History:   Diagnosis Date     Collagenous colitis 9/4/2018     CXR Lungs Solitary Pulmonary Nodule (___ Cm)      CT from 11/2015: 1.3 x 0.9 cm left lower lobe pulmonary nodule, stable to minimally decreased when compared to 6/24/2014. Recommend one additional follow-up to document 2 years of stability. Pt declines repeat CT as of 11/2018.      Past Surgical History:   Procedure Laterality Date     BREAST BIOPSY Right 2012 ?     ROTATOR CUFF REPAIR        Family History   Problem Relation Age of  Onset     Pancreatic cancer Father 86      Social History     Socioeconomic History     Marital status:      Spouse name: Not on file     Number of children: Not on file     Years of education: Not on file     Highest education level: Not on file   Occupational History     Not on file   Social Needs     Financial resource strain: Not on file     Food insecurity     Worry: Not on file     Inability: Not on file     Transportation needs     Medical: Not on file     Non-medical: Not on file   Tobacco Use     Smoking status: Former Smoker     Smokeless tobacco: Never Used   Substance and Sexual Activity     Alcohol use: No     Frequency: Never     Drug use: No     Sexual activity: Not on file   Lifestyle     Physical activity     Days per week: Not on file     Minutes per session: Not on file     Stress: Not on file   Relationships     Social connections     Talks on phone: Not on file     Gets together: Not on file     Attends Mandaen service: Not on file     Active member of club or organization: Not on file     Attends meetings of clubs or organizations: Not on file     Relationship status: Not on file     Intimate partner violence     Fear of current or ex partner: Not on file     Emotionally abused: Not on file     Physically abused: Not on file     Forced sexual activity: Not on file   Other Topics Concern     Not on file   Social History Narrative    Lives with her , followed at [facility] for vulvodynia and on chronic pain med- tramadol.     2 grown sons    2 grand kids (26 yr old GD and 23 yr old GS)        Gogo Nam MD      Current Outpatient Medications   Medication Sig Dispense Refill     albuterol (PROAIR HFA;PROVENTIL HFA;VENTOLIN HFA) 90 mcg/actuation inhaler Inhale 2 puffs every 6 (six) hours as needed for wheezing. 1 each 0     DULoxetine (CYMBALTA) 60 MG capsule Take 1 capsule (60 mg total) by mouth daily. 90 capsule 1     sertraline (ZOLOFT) 100 MG tablet Take 1 tablet (100 mg  "total) by mouth daily. 90 tablet 3     traMADoL (ULTRAM) 50 mg tablet TAKE 2 TABLETS BY MOUTH EVERY DAY. MAY TAKE UP TO 6 TABLET ON TRAVEL DAYS 2 TABLET EVERY 8 HOURS. 60 tablet 0     calcium carbonate (OS-TRINY) 600 mg calcium (1,500 mg) tablet Take 1 tablet (600 mg total) by mouth daily. 100 tablet 2     traMADoL (ULTRAM) 50 mg tablet Take 1 tablet (50 mg total) by mouth every 6 (six) hours for 21 days. Alternatively plan to take 100mg in AM and 100mg in PM if preferring this on the trip. 84 tablet 0     No current facility-administered medications for this visit.       Objective:   Vital Signs:   Visit Vitals  /72 (Patient Site: Left Arm, Patient Position: Sitting, Cuff Size: Adult Regular)   Pulse 76   Ht 5' 3.5\" (1.613 m)   Wt 133 lb 14.4 oz (60.7 kg)   BMI 23.35 kg/m           VisionScreening:  No exam data present     PHYSICAL EXAM  Constitutional: Patient is oriented to person, place, and time. Patient appears well-developed and well-nourished. No distress.   Head: Normocephalic and atraumatic.   Ears: External ear and TMs normal bilaterally.  Nose: Nose normal.   Mouth/Throat: Oropharynx is clear and moist. No oropharyngeal exudate.   Eyes: Conjunctivae and EOM are normal. Pupils are equal, round, and reactive to light. No discharge. No scleral icterus.   Neck: Neck supple. No JVD present. No tracheal deviation present. No thyromegaly present.  Cardiovascular: Normal rate, regular rhythm, normal heart sounds and intact distal pulses. No murmur heard.   Pulmonary/Chest: Effort normal and breath sounds normal. Patient has no wheezes, no rales, exhibits no tenderness.   Abdominal: Soft. Bowel sounds are normal. No masses. There is no tenderness.   Lymphadenopathy:  Patient has no cervical adenopathy.   Neurological: Patient is alert and oriented to person, place, and time. Patient has normal reflexes. No cranial nerve deficit. Coordination normal.   Skin: Skin is warm and dry. No rash noted. No pallor. "   Psychiatric: Patient has good eye contact without any psychomotor retardation or stereotypic behaviors.  normal mood and affect. Judgment and thought content normal.   Speech is regular rate and rhythm.       Assessment Results 2021   Activities of Daily Living No help needed   Instrumental Activities of Daily Living No help needed   Mini Cog Total Score 5   Some recent data might be hidden     A Mini-Cog score of 0-2 suggests the possibility of dementia, score of 3-5 suggests no dementia    Identified Health Risks:   PHQ9 score PHQ-9 Total Score: 3 (2021  9:00 AM)    Little interest or pleasure in doing things: Several days  Feeling down, depressed, or hopeless: Several days  Trouble falling or staying asleep, or sleeping too much: Several days  Feeling tired or having little energy: Not at all  Poor appetite or overeating: Not at all  Feeling bad about yourself - or that you are a failure or have let yourself or your family down: Not at all  Trouble concentrating on things, such as reading the newspaper or watching television: Not at all  Moving or speaking so slowly that other people could have noticed. Or the opposite - being so fidgety or restless that you have been moving around a lot more than usual: Not at all  Thoughts that you would be better off dead, or of hurting yourself in some way: Not at all  PHQ-9 Total Score: 3  If you checked off any problems, how difficult have these problems made it for you to do your work, take care of things at home, or get along with other people?: Not difficult at all    GAD7 score JOSÉ LUIS-7 Total: 3 (2021  9:00 AM)    Feeling nervous, anxious or on edge: 0 (2021  9:00 AM)  Not being able to stop or control worry: 1 (2021  9:00 AM)  Worrying too much about different things: 1 (2021  9:00 AM)  Trouble relaxin (2021  9:00 AM)  Being so restless that is is hard to sit still: 0 (2021  9:00 AM)  Becoming easily annnoyed or irritable: 0  (4/14/2021  9:00 AM)  Feeling afraid as if something awful might happen: 1 (4/14/2021  9:00 AM)  JOSÉ LUIS-7 Total: 3 (4/14/2021  9:00 AM)  How difficult did these problems make it for you to do your work, take care of things at home or get along with other people? : Not difficult at all (4/14/2021  9:00 AM)  How difficult did these problems make it for you to do your work, take care of things at home or get along with other people? : Not difficult at all (4/14/2021  9:00 AM)        ACT  In the past 4 weeks, how much of the time did your asthma keep you from getting as much done at work, school, or at home?: None of the time  During the past 4 weeks, how often have you had shortness of breath?: Once a day  During the past 4 weeks, how often did your asthma symptoms (wheezing, coughing, shortness of breath, chest tightness or pain) wake you up at night or earlier in the morning?: Not at all  During the past 4 weeks, how often have you used your rescue inhaler or nebulizer medication (such as albuterol)?: Not at all  How would you rate your asthma control during the past 4 weeks?: Somewhat controlled  ACT Total Score: 20  In the past 12 months, have you visited the emergency room due to your asthma?: No  In the past 12 months, have you been hospitalized due to your asthma?: No      See flowsheets for PEG score

## 2021-06-17 NOTE — PROGRESS NOTES
Assessment/Plan:        Diagnoses and all orders for this visit:    Depression  -     DULoxetine (CYMBALTA) 60 MG capsule; TAKE 1 CAPSULE EVERY DAY  Dispense: 90 capsule; Refill: 1    Vulvodynia  -     traMADol (ULTRAM) 50 mg tablet; TAKE 2 TABLETS 3 TIMES A DAY AS NEEDED FOR PAIN  Dispense: 180 tablet; Refill: 0    Other orders  -     sertraline (ZOLOFT) 100 MG tablet; Take 1 tablet (100 mg total) by mouth daily.  Dispense: 90 tablet; Refill: 1        We will continue with her medications.  Will provide her with medication refill.  She is to continue to limit tramadol use and less if she feels that the pain is tolerable.  She is to recheck in 3 months time, earlier if symptomatic.  She was agreeable with the plans.  Subjective:    Patient ID: Demetrice Garcia is a 72 y.o. female.    MARRY Suresh is here for follow-up regarding her medication.  She takes her duloxetine, sertraline, tramadol regularly.  She notes that her emotions are stable.  There are days where she reflects on her life and feels sad but she has a good life and tries to distract herself and do things.  She tries to move a lot and she is happy as her kids are doing well and she has them to live for.  She denies any suicidal thoughts or ideation.  No constant negative thoughts, loss of interest or motivation.  JOSÉ LUIS 7 and PHQ 9 scores were 0.    She has been taking tramadol 2 tablets up to twice a day for her vulvodynia.  More when she is sitting for too long.  She traveled recently for 3 weeks on the road.  They were in a group and had a van.  She was able to lay which helps decrease the pressure in her pelvic region.  She tolerated it well.  She has days where she only takes 2 tramadol and which keeps it in good control.  She has not been back to [facility] for a repeat injection.  It has been stable at this time.  Denies any ill effects from the tramadol.  She has around 3 alcoholic beverage per week.  Does not combine her tramadol with alcohol.  She  does not feel foggy the next day.    Review of Systems  As above otherwise negative.          Objective:    Physical Exam  /60 (Patient Site: Left Arm, Patient Position: Sitting, Cuff Size: Adult Regular)  Pulse 95  Wt 133 lb (60.3 kg)  SpO2 95%  Breastfeeding? No  BMI 23.56 kg/m2    Vital signs noted above. AAO ×3.  HEENT no nasal discharge, moist oral mucosa. Neck: Supple neck, nonpalpable cervical lymph nodes.  Lungs: Clear to auscultation bilateral.  Heart: S1-S2 regular rate and rhythm, no murmurs were noted.  Abdomen: Flat, soft with bowel sounds and nontender.  Extremities: pulses were full and equal.

## 2021-06-19 NOTE — LETTER
Letter by Gogo Nam MD at      Author: Gogo Nam MD Service: -- Author Type: --    Filed:  Encounter Date: 11/25/2019 Status: Signed         [facility] FAMILY MEDICINE/OB  11/25/19    Patient: Demetrice Garcia  YOB: 1945  Medical Record Number: [id number]  CSN: [id number]                                                                              Non-opioid Controlled Substance Agreement  Tramadol 50mg tablets- takes 100mg daily, with PRN during travel 50mg q6hr as needed  [facility] [place] [facility]- generally call to confirm due to pharmacy not in     I understand that my care provider has prescribed a controlled substance to help manage my condition(s). I am taking this medicine to help me function or work. I know this is strong medicine, and that it can cause serious side effects. Controlled substances can be sedating, addicting and may cause a dependency on the drug. They can affect my ability to drive or think, and cause depression. They need to be taken exactly as prescribed. Combining controlled substances with certain medicines or chemicals (such as cocaine, sedatives and tranquilizers, sleeping pills, meth) can be dangerous or even fatal. Also, if I stop controlled substances suddenly, I may have severe withdrawal symptoms.  If not helpful, I may be asked to stop them.    The risks, benefits, and side effects of these medicine(s) were explained to me. I agree that:    1. I will take part in other treatments as advised by my care team. This may be psychiatry or counseling, physical therapy, behavioral therapy, group treatment or a referral to a pain clinic. I will reduce or stop my medicine when my care team tells me to do so.  2. I will take my medicines as prescribed. I will not change the dose or schedule unless my care team tells me to. There will be no refills if I run out early.  I may be contactedwithout warning and asked to complete a urine drug test  or pill count at any time.   3. I will keep all my appointments, and understand this is part of the monitoring of controlled substances. My care team may require an office visit for EVERY controlled substance refill. If I miss appointments or dont follow instructions, my care team may stop my medicine.  4. I will not ask other providers to prescribe controlled substances, and I will not accept controlled substances from other people. If I need another prescribed controlled substance for a new reason, I will tell my care team within 1 business day.  5. I will use one pharmacy to fill all of my controlled substance prescriptions, and it is up to me to make sure that I do not run out of my medicines on weekends or holidays. If my care team is willing to refill my controlled substance prescription without a visit, I must request refills only during office hours, refills may take up to 3 days to process, and it may take up to 5 to 7 days for my medicine to be mailed and ready at my pharmacy. Prescriptions will not be mailed anywhere except my pharmacy.    6. I am responsible for my prescriptions. If the medicine/prescription is lost or stolen, it will not be replaced. I also agree not to share controlled substance medicines with anyone.          [facility] FAMILY MEDICINE/OB  11/25/19  Patient:  Demetrice Garcia  YOB: 1945  Medical Record Number: [id number]  CSN: [id number]    7. I agree to not use ANY illegal or recreational drugs. This includes marijuana, cocaine, bath salts or other drugs. I agree not to use alcohol unless my care team says I may. I agree to give urine samples whenever asked. If I dont give a urine sample, the care team may stop my medicine.    8. If I enroll in the [place] Medical Marijuana program, I will tell my care team. I will also sign an agreement to share my medical records with my care team.    9. I will bring in my list of medicines (or my medicine bottles) each time I  come to the clinic.   10. I will tell my care team right away if I become pregnant or have a new medical problem treated outside of my regular clinic.  11. I understand that this medicine can affect my thinking and judgment. It may be unsafe for me to drive, use machinery and do dangerous tasks. I will not do any of these things until I know how the medicine affects me. If my dose changes, I will wait to see how it affects me. I will contact my care team if I have concerns about medicine side effects.    I understand that if I do not follow any of the conditions above, my prescriptions or treatment may be stopped.      I agree that my provider, clinic care team, and pharmacy may work with any city, state or federal law enforcement agency that investigates the misuse, sale, or other diversion of my controlled medicine. I will allow my provider to discuss my care with or share a copy of this agreement with any other treating provider, pharmacy or emergency room where I receive care. I agree to give up (waive) any right of privacy or confidentiality with respect to these consents.   I have read this agreement and have asked questions about anything I did not understand.    ___________________________________________________________________________  Patient signature - Date/Time  -Demetrice Garcia                                      ___________________________________________________________________________  Witness signature                                                                    ___________________________________________________________________________  Provider signature- Gogo Nam MD

## 2021-06-20 NOTE — PROGRESS NOTES
Preoperative Exam    Scheduled Procedure: Rotator Cuff surgery on the right shoulder  Surgery Date:  09/28/18  Surgery Location: [facility], fax [phone]    Surgeon:  Dr. Leyva    Assessment/Plan:     Demetrice was seen today for pre-op exam.    Preop cardiovascular exam  -     Electrocardiogram Perform - Clinic  -     HM2(CBC w/o Differential)  -     Basic Metabolic Panel    Encounter to establish care    Tear of right rotator cuff, unspecified tear extent  Comments:  pain going on for last 5 yr , did PT/ cortisone in the past     Lichen Sclerosus Et Atrophicus  Comments:  take tramadol for pain in vaginal area     Recurrent major depressive disorder, in partial remission (H)  Comments:  well controlled on current tx plan    Vulvodynia  Comments:  take tramadol daily    Other orders  -     calcium carbonate (OS-TRINY) 600 mg calcium (1,500 mg) tablet; Take 1 tablet (600 mg total) by mouth daily.        Surgical Procedure Risk: Low (reported cardiac risk generally < 1%)  Have you had prior anesthesia?: Yes  Have you or any family members had a previous anesthesia reaction:  No  Do you or any family members have a history of a clotting or bleeding disorder?: No  Cardiac Risk Assessment: no increased risk for major cardiac complications    Patient approved for surgery with general or local anesthesia.    Patient is taking medications for Chronic Pain.    Functional Status: Independent  Patient plans to recover at home with family.     Subjective:      Demetrice Garcia is a 73 y.o. female who presents for a preoperative consultation.to have rotator cuff tear   All other systems reviewed and are negative, other than those listed in the HPI.    Pertinent History  Do you have difficulty breathing or chest pain after walking up a flight of stairs: Yes: little trouble breathing  History of obstructive sleep apnea: No  Steroid use in the last 6 months: No  Frequent Aspirin/NSAID use:  No  Prior Blood Transfusion: No  Prior Blood Transfusion Reaction: No  If for some reason prior to, during or after the procedure, if it is medically indicated, would you be willing to have a blood transfusion?:  There is no transfusion refusal.    Current Outpatient Prescriptions   Medication Sig Dispense Refill     DULoxetine (CYMBALTA) 60 MG capsule TAKE 1 CAPSULE EVERY DAY 90 capsule 1     sertraline (ZOLOFT) 100 MG tablet Take 1 tablet (100 mg total) by mouth daily. 90 tablet 1     traMADol (ULTRAM) 50 mg tablet TAKE 2 TABLETS 3 TIMES A DAY AS NEEDED FOR PAIN 180 tablet 0     calcium carbonate (OS-TRINY) 600 mg calcium (1,500 mg) tablet Take 1 tablet (600 mg total) by mouth daily. 100 tablet 2     No current facility-administered medications for this visit.         Allergies   Allergen Reactions     Shellfish Containing Products      Stomach pain     Naproxen Rash       Patient Active Problem List   Diagnosis     Lichen Sclerosus Et Atrophicus     Mild Intermittent Asthma     Insomnia     Irritable Bowel Syndrome     CXR Lungs Solitary Pulmonary Nodule (___ Cm)     Depression     Collagenous colitis       No past medical history on file.    Past Surgical History:   Procedure Laterality Date     BREAST BIOPSY Left 2012 ?       Social History     Social History     Marital status:      Spouse name: N/A     Number of children: N/A     Years of education: N/A     Occupational History     Not on file.     Social History Main Topics     Smoking status: Former Smoker     Smokeless tobacco: Never Used     Alcohol use Not on file     Drug use: Not on file     Sexual activity: Not on file     Other Topics Concern     Not on file     Social History Narrative    Lives with her , followed at [facility] for vulvodynia and on chronic pain med    2 grown son    2 grand kids (26 yr old GD and 23 yr old GS)        Planning wedding for GD sep 15 2018        Rosio Zaragoza MD 9/4/2018 10:54 AM         Patient Care  "Team:  Rosio Zaragoza MD as PCP - General (Family Medicine)          Objective:     Vitals:    09/04/18 1024   BP: 112/60   Pulse: 70   Temp: 97.6  F (36.4  C)   TempSrc: Oral   Weight: 133 lb 11.2 oz (60.6 kg)   Height: 5' 3.5\" (1.613 m)         Physical Exam:    General: Alert, no acute distress.   HEENT: normocephalic conjunctivae are clear, Normal pearly TMs bilaterally without erythema, pus or fluid.   Nose is clear.  Oropharynx is moist and clear,   Neck: supple without adenopathy or thyromegaly.  Lungs: Good aeration bilaterally. No prolongation of expiratory phase.   No tachypnea, retractions, or increased work of breathing. Clear to auscultation without wheezes, rales or rhonci.    Heart: regular rate and rhythm, normal S1 and S2, no murmurs  Abdomen: soft and nontender, bowel sounds are present, no hepatosplenomegaly or mass palpable.  Skin: clear without rash or lesions  Neuro: alert, interactive moving all extremities equally, normal muscle tone in all 4 extremities, deep tendon reflexes 2+ symmetrically at the patella        EKG:  normal sinus rhythm     Labs:  Recent Results (from the past 240 hour(s))   Electrocardiogram Perform - Clinic    Collection Time: 09/04/18 10:34 AM   Result Value Ref Range    SYSTOLIC BLOOD PRESSURE  mmHg    DIASTOLIC BLOOD PRESSURE  mmHg    VENTRICULAR RATE 76 BPM    ATRIAL RATE 76 BPM    P-R INTERVAL 122 ms    QRS DURATION 80 ms    Q-T INTERVAL 376 ms    QTC CALCULATION (BEZET) 423 ms    P Axis 56 degrees    R AXIS 44 degrees    T AXIS 49 degrees    MUSE DIAGNOSIS       Normal sinus rhythm  Normal ECG  When compared with ECG of 24-JUN-2014 08:30,  No significant change was found  Confirmed by JERAMIE DEJESUS, NAYE LOC: ([id number]) on 9/4/2018 2:46:40 PM     HM2(CBC w/o Differential)    Collection Time: 09/04/18 10:51 AM   Result Value Ref Range    WBC 5.7 4.0 - 11.0 thou/uL    RBC 4.69 3.80 - 5.40 mill/uL    Hemoglobin 15.0 12.0 - 16.0 g/dL    Hematocrit 44.4 35.0 - 47.0 %    MCV 95 " 80 - 100 fL    MCH 31.9 27.0 - 34.0 pg    MCHC 33.7 32.0 - 36.0 g/dL    RDW 12.5 11.0 - 14.5 %    Platelets 245 140 - 440 thou/uL    MPV 6.9 (L) 7.0 - 10.0 fL       Immunization History   Administered Date(s) Administered     Hep A, historic 12/23/2009, 09/23/2010     Hep B, historic 12/23/2009, 06/21/2010, 09/23/2010     Influenza Z3g9-04, 12/23/2009     Influenza Whole 11/21/2008     Influenza high dose, seasonal 10/27/2015, 09/20/2016, 09/07/2017     Influenza, Seasonal, Inj PF IIV3 09/23/2010, 11/04/2011     Influenza, inj, historic,unspecified 11/13/2007, 10/12/2012     Influenza, seasonal,quad inj 6-35 mos 10/25/2013, 11/06/2014     Pneumo Conj 13-V (2010&after) 12/13/2016     Pneumo Polysac 23-V 01/16/2018     Td,adult,historic,unspecified 06/21/2010     Tdap 10/12/2012     Typhoid, historic, Unspecified 12/23/2009     ZOSTER, LIVE 07/23/2012           Electronically signed by Rosio Zaragoza MD 09/04/18 10:28 AM

## 2021-06-20 NOTE — LETTER
Letter by Gogo Nam MD at      Author: Gogo Nam MD Service: -- Author Type: --    Filed:  Encounter Date: 4/6/2020 Status: (Other)       My Asthma Action Plan     Name: Demetrice Garcia   YOB: 1945  Date: 4/6/2020   My doctor: Gogo Nam MD   My clinic: [facility]        My Rescue Medicine:   Declines need for Albuterol (Proair/Ventolin/Proventil HFA) 2-4 puffs EVERY 4 HOURS as needed. Use a spacer if recommended by your provider.   My Asthma Severity:   Intermittent/Exercise Induced  Know your asthma triggers: upper respiratory infections             GREEN ZONE   Good Control    I feel good    No cough or wheeze    Can work, sleep and play without asthma symptoms     Take your asthma control medicine every day.     1. If exercise triggers your asthma, take your rescue medication    15 minutes before exercise or sports, and    During exercise if you have asthma symptoms  2. Spacer to use with inhaler: If you have a spacer, make sure to use it with your inhaler             YELLOW ZONE Getting Worse  I have ANY of these:    I do not feel good    Cough or wheeze    Chest feels tight    Wake up at night 1. Keep taking your Green Zone medications  2. Start taking your rescue medicine:    every 20 minutes for up to 1 hour. Then every 4 hours for 24-48 hours.  3. If you stay in the Yellow Zone for more than 12-24 hours, contact your doctor.  4. If you do not return to the Green Zone in 12-24 hours or you get worse, start taking your oral steroid medicine if prescribed by your provider.           RED ZONE Medical Alert - Get Help  I have ANY of these:    I feel awful    Medicine is not helping    Breathing getting harder    Trouble walking or talking    Nose opens wide to breathe     1. Take your rescue medicine NOW  2. If your provider has prescribed an oral steroid medicine, start taking it NOW  3. Call your doctor NOW  4. If you are still in  the Red Zone after 20 minutes and you have not reached your doctor:    Take your rescue medicine again and    Call 911 or go to the emergency room right away    See your regular doctor within 2 weeks of an Emergency Room or Urgent Care visit for follow-up treatment.          Annual Reminders:  Meet with Asthma Educator,  Flu Shot in the Fall, consider Pneumonia Vaccination for patients with asthma (aged 19 and older).    Pharmacy:   [id number] - [place], [place] - [address] AT [facility] [address]  [address]  [facility] [address]  Phone: [phone] Fax: [phone]    [facility] [id number] [place] [facility] - [place], [place] - [address] [place] [address] ROAD  [address] [address]  Phone: [phone] Fax: [phone]      Electronically signed by Gogo Nam MD   Date: 04/06/20                      Asthma Triggers  How To Control Things That Make Your Asthma Worse    Triggers are things that make your asthma worse.  Look at the list below to help you find your triggers and what you can do about them.  You can help prevent asthma flare-ups by staying away from your triggers.      Trigger                                                          What you can do   Cigarette Smoke  Tobacco smoke can make asthma worse. Do not allow smoking in your home, car or around you.  Be sure no one smokes at a val day care or school.  If you smoke, ask your health care provider for ways to help you quit.  Ask family members to quit too.  Ask your health care provider for a referral to Quit Plan to help you quit smoking, or call 5-940-676-PLAN.     Colds, Flu, Bronchitis  These are common triggers of asthma. Wash your hands often.  Dont touch your eyes, nose or mouth.  Get a flu shot every year.     Dust Mites  These are tiny bugs that live in cloth or carpet. They are too small to see. Wash sheets and blankets in hot water every week.   Encase pillows and mattress in dust mite proof covers.  Avoid having carpet  if you can. If you have carpet, vacuum weekly.   Use a dust mask and HEPA vacuum.   Pollen and Outdoor Mold  Some people are allergic to trees, grass, or weed pollen, or molds. Try to keep your windows closed.  Limit time out doors when pollen count is high.   Ask you health care provider about taking medicine during allergy season.     Animal Dander  Some people are allergic to skin flakes, urine or saliva from pets with fur or feathers. Keep pets with fur or feathers out of your home.    If you cant keep the pet outdoors, then keep the pet out of your bedroom.  Keep the bedroom door closed.  Keep pets off cloth furniture and away from stuffed toys.     Mice, Rats, and Cockroaches  Some people are allergic to the waste from these pests.   Cover food and garbage.  Clean up spills and food crumbs.  Store grease in the refrigerator.   Keep food out of the bedroom.   Indoor Mold  This can be a trigger if your home has high moisture. Fix leaking faucets, pipes, or other sources of water.   Clean moldy surfaces.  Dehumidify basement if it is damp and smelly.   Smoke, Strong Odors, and Sprays  These can reduce air quality. Stay away from strong odors and sprays, such as perfume, powder, hair spray, paints, smoke incense, paint, cleaning products, candles and new carpet.   Exercise or Sports  Some people with asthma have this trigger. Be active!  Ask your doctor about taking medicine before sports or exercise to prevent symptoms.    Warm up for 5-10 minutes before and after sports or exercise.     Other Triggers of Asthma  Cold air:  Cover your nose and mouth with a scarf.  Sometimes laughing or crying can be a trigger.  Some medicines and food can trigger asthma.

## 2021-06-21 NOTE — PROGRESS NOTES
[facility] Daily Progress     Patient Name: Demetrice Garcia  Date: 11/6/2018  Visit #: 2/16  Evaluation dates: 11/1/18  Referral Diagnosis: Status post arthroscopy of shoulder  Referring provider: Yadi Novak PA-C  Visit Diagnosis:     ICD-10-CM    1. Acute post-operative pain G89.18    2. Shoulder stiffness, right M25.611    3. Decreased strength R53.1    4. S/P rotator cuff repair Z98.890        Precautions / Restrictions : Sling untill follow up at least until next MD follow up in 2 weeks, No active range, AAROM and PROM per order.     Assessment:     Response to Intervention:  Tolerated treatment well.  Improving PROM.    Symptoms are consistent with:  Medical diagnosis.  Patient is appropriate to continue with skilled physical therapy intervention, as indicated by initial plan of care.    Goal Status:  Pt. will demonstrate/verbalize independence in self-management of condition in : 12 weeks  Pt. will be independent with home exercise program in : 12 weeks  Pt. will have improved quality of sleep: with less pain;waking less times/night;in 6 weeks  Pt. will improve posture : and demonstrate posture with minimal to no cuing;in 6 weeks  Patient will reach / maintain arm movement: forward;overhead;behind;for home chores;for dressing;with partial ROM;in 4 weeks  No Data Recorded  Other functional progress:          Plan / Patient Education:     Continue with initial plan of care.  Progress with home program as tolerated.  Per protocol    Subjective:     Pain Rating:  Resting 2  Activity:  4    Response to last treatment: sore for a while but overall feeling good.  HEP- Frequency: 2-3x/day, Questions or difficulties:  none.    Patient reports:            Objective:            Manual Therapy  MFR right: deltoid, upper trap, pec major/minor, biceps.    Exercises:  Exercise #1: review and reinstruction for Butchman's 20 reps each direction with therapist demonstration, verbal and tactile cuing.   written/picture instructions provided.  Exercise #2: elbow flexion/extension   Comment #2: x 10 each  Exercise #3: wrist flexion/extension, pronation/supination   Comment #3: x 10 each  Exercise #4: PROM shoulder all directions       Treatment Today    TREATMENT MINUTES COMMENTS   Evaluation     Self-care/ Home management     Manual therapy 15 See above.   Neuromuscular Re-education     Therapeutic Activity     Therapeutic Exercises 10 See exercise flow-sheet for details.    Gait training     Modality__________________                Total 25    Blank areas are intentional and mean the treatment did not include these items.       Rex Mendoza, PT  11/6/2018

## 2021-06-21 NOTE — PROGRESS NOTES
[facility]   Shoulder Initial Evaluation    Patient Name: Demetrice Garcia  Date of evaluation: 11/1/2018  Referral Diagnosis: Status post arthroscopy of shoulder  Referring provider: Yadi Novak PA-C  Visit Diagnosis:     ICD-10-CM    1. Acute post-operative pain G89.18    2. Shoulder stiffness, right M25.611    3. Decreased strength R53.1    4. S/P rotator cuff repair Z98.890        Assessment:      Impairments in  pain, posture, ROM, joint mobility, strength  Patient's signs and symptoms are consistent with medical diagnosis.  Patient responded well to manual therapy and review of HEP.  Prognosis to achieve goals is  good   Pt. is appropriate for skilled PT intervention as outlined in the Plan of Care (POC).    Goals:  Pt. will demonstrate/verbalize independence in self-management of condition in : 12 weeks  Pt. will be independent with home exercise program in : 12 weeks  Pt. will have improved quality of sleep: with less pain;waking less times/night;in 6 weeks  Pt. will improve posture : and demonstrate posture with minimal to no cuing;in 6 weeks  Patient will reach / maintain arm movement: forward;overhead;behind;for home chores;for dressing;with partial ROM;in 4 weeks  No Data Recorded    Patient's expectations/goals are realistic.    Barriers to Learning or Achieving Goals:  No Barriers.       Plan / Patient Instructions:        Plan of Care:   Communication with: Referral Source;Patient Caregiver  Patient Related Instruction: Nature of Condition;Treatment plan and rationale;Self Care instruction;Basis of treatment;Body mechanics;Posture;Expected outcome;Next steps;Precautions  Times per Week: 2-1  Number of Weeks: 12  Number of Visits: 16  Discharge Planning: to include self management strategies and HEP.  PT will be discharged when goals are met or when patient is ready to continue with self care independently.  If condition declines or if the patient fails to progress, they will be  referred back to their PCP or other appropriate health care provider.   Precautions / Restrictions : Sling untill follow up at least until next MD follow up in 2 weeks, No active range, AAROM and PROM per order.  Therapeutic Exercise: ROM;Stretching;Strengthening  Neuromuscular Reeducation: posture;core;TNE  Manual Therapy: soft tissue mobilization;myofascial release;joint mobilization  Modalities: cold pack;hot pack    Pt. is in agreement with the Plan of Care  A Home Exercise Program (HEP) was initiated today.  Pt. was instructed in exercises by PT and patient was given a handout with detailed instructions.  Plan for next visit: review HEP, continue with PROM/AAROM, continue manual therapy.  .   Subjective:           History of Present Illness:    Demetrice is a 73 y.o. female who presents to therapy today with complaints of right shoulder pain, stiffness and strength deficit s/p right shoulder arthroscopic RCR.. Date of onset/duration of symptoms is 10/2018. Onset was surgery. Symptoms are constant and getting better. She denies history of similar symptoms. She describes their previous level of function as not limited    Pain Ratin  Pain rating at best: 2  Pain rating at worst: 8  Pain description: aching, burning, dull, pain, sharp, shooting, soreness and weakness    Functional limitations are described as occurring with:   lifting  personal cares dressing lower body, donning shoes and socks, donning shirt or jacket, donning bra, combing hair, washing hair and washing body  reaching at shoulder height, overhead and behind back  sleeping  transitional movements getting in  bed and getting out of  bed           Objective:      Note: Items left blank indicates the item was not performed or not indicated at the time of the evaluation.    Patient Outcome Measures :    Shoulder Pain and Disability Index (SPADI) in %: 70.77   Scores range from 0-100%, where a score of 0% represents minimal pain and maximal function. The  minimal clincically important difference is a score reduction of 10%.    Shoulder Examination  1. Acute post-operative pain     2. Shoulder stiffness, right     3. Decreased strength     4. S/P rotator cuff repair       Involved side: Right  Posture Observation:      General sitting posture is  fair.  General standing posture is fair.  Cervical:  Moderate forward head  Shoulder/Thoracic complex: Severe right scapular protraction  Moderate left scapular protraction       Upper Extremity ROM/Strength:            Right           Left     * indicates pain   AROM   PROM   MMT/5   AROM   PROM   MMT/5     Shoulder Flexion 180       90                 Shoulder Extension  60                        Shoulder Abduction  180       86                 Shoulder ER  70  @ side  5         Shoulder IR/Ext reach  T3           Shoulder GH ER  90            Shoulder GH IR  70            Elbow Flexion  150    WNL                    Elbow Extension 0    lacking 5                          Palpation: tender right: upper trap, scalene, pec major, minor, deltoid, biceps.    Joint Assessment:  Sternoclavicular Joint Assessment: Not Indicated  Acromioclavicular Joint Assessment: Not Indicated  Scapulothoracic Joint Assessment: Hypomobile.  Glenohumeral Joint Assessment:Hypomobile.    Shoulder Special Tests   NT  Impingement Cluster Right (+/-) Left (+/-) Rotator Cuff Tests Right (+/-) Left (+/-)   Lopez-Librado   Drop Arm Sign     Painful Arc   Hornblowers     Infraspinatus Test   ERLS     AC Tests Right (+/-) Left (+/-) IRLS     Active Compression   Labral Tests Right (+/-) Left (+/-)   Crossbody Adduction   Biceps Load Test II     AC Resisted Extension   Jerk Test     GH Instability Tests Right (+/-) Left (+/-) Daphney Test     Sulcus Sign   Biceps Tests Right (+/-) Left (+/-)   Apprehension   Speed     Relocation   Scott donovan     Other:   Other:     Other:   Other:       Exercises:  Exercise #1: review and reinstruction for [employer]'s 20 reps  each direction with therapist demonstration, verbal and tactile cuing.  written/picture instructions provided.  Exercise #2: elbow flexion/extension   Comment #2: x 10 each  Exercise #3: wrist flexion/extension, pronation/supination   Comment #3: x 10 each       Treatment Today      MFR layers 1-2 right upper trap, scalene, pec major, minor, deltoid, biceps.  TREATMENT MINUTES COMMENTS   Evaluation 20    Self-care/ Home management     Manual therapy 20    Neuromuscular Re-education     Therapeutic Activity     Therapeutic Exercises 10    Gait training     Modality__________________                Total 50    Blank areas are intentional and mean the treatment did not include these items.     PT Evaluation Code: (Please list factors)  Patient History/Comorbidities:   Patient Active Problem List   Diagnosis     Lichen Sclerosus Et Atrophicus     Mild Intermittent Asthma     Insomnia     Irritable Bowel Syndrome     CXR Lungs Solitary Pulmonary Nodule (___ Cm)     Depression     Collagenous colitis      Examination: as above  Clinical Presentation:  Stable   Clinical Decision Making: low    Patient History/  Comorbidities Examination  (body structures and functions, activity limitations, and/or participation restrictions) Clinical Presentation Clinical Decision Making (Complexity)   No documented Comorbidities or personal factors 1-2 Elements Stable and/or uncomplicated Low   1-2 documented comorbidities or personal factor 3 Elements Evolving clinical presentation with changing characteristics Moderate   3-4 documented comorbidities or personal factors 4 or more Unstable and unpredictable High              Rex Mendoza  11/1/2018  3:51 PM

## 2021-06-21 NOTE — PROGRESS NOTES
[facility] Daily Progress     Patient Name: Demetrice Garcia  Date: 11/12/2018  Visit #: 4/16  Evaluation dates: 11/1/18  Referral Diagnosis: Status post arthroscopy of shoulder  Referring provider: Yadi Novak PA-C  Visit Diagnosis:     ICD-10-CM    1. Acute post-operative pain G89.18    2. Shoulder stiffness, right M25.611    3. Decreased strength R53.1    4. S/P rotator cuff repair Z98.890        Precautions / Restrictions : Sling untill follow up at least until next MD follow up in 2 weeks, No active range, AAROM and PROM per order.       Assessment:     Response to Intervention:  Tolerated treatment well.  Improving PROM.    Symptoms are consistent with:  Medical diagnosis.  Patient is appropriate to continue with skilled physical therapy intervention, as indicated by initial plan of care.    Goal Status:  Pt. will demonstrate/verbalize independence in self-management of condition in : 12 weeks  Pt. will be independent with home exercise program in : 12 weeks  Pt. will have improved quality of sleep: with less pain;waking less times/night;in 6 weeks  Pt. will improve posture : and demonstrate posture with minimal to no cuing;in 6 weeks  Patient will reach / maintain arm movement: forward;overhead;behind;for home chores;for dressing;with partial ROM;in 4 weeks    No Data Recorded  Other functional progress:          Plan / Patient Education:     Continue with initial plan of care.  Progress with home program as tolerated.  Per protocol    Subjective:     Pain Rating:  Resting 2  Activity:  4    Response to last treatment: sore for a while but overall feeling good.  HEP- Frequency: 2-3x/day, Questions or difficulties:  none.    Patient reports:      Her shoulder is feeling good.      Objective:            Manual Therapy  MFR right: deltoid, upper trap, pec major/minor, biceps.    Exercises:  Exercise #1: review and reinstruction for Jeffy's 20 reps each direction with therapist demonstration,  verbal and tactile cuing.  written/picture instructions provided.  Exercise #2: elbow flexion/extension   Comment #2: x 10 each  Exercise #3: wrist flexion/extension, pronation/supination   Comment #3: x 10 each  Exercise #4: PROM shoulder all directions     PROM painfree  Flexion 135  Abduction 110  IR 40  ER 20  Treatment Today    TREATMENT MINUTES COMMENTS   Evaluation     Self-care/ Home management     Manual therapy 15 See above.   Neuromuscular Re-education     Therapeutic Activity     Therapeutic Exercises 10 See exercise flow-sheet for details.    Gait training     Modality__________________                Total 25    Blank areas are intentional and mean the treatment did not include these items.       Rex Mendoza, PT  11/12/2018

## 2021-06-21 NOTE — LETTER
Letter by Gogo Nam MD at      Author: Gogo Nam MD Service: -- Author Type: --    Filed:  Encounter Date: 4/14/2021 Status: (Other)       My Asthma Action Plan     Name: Demetrice Garcia   YOB: 1945  Date: 4/14/2021   My doctor: Gogo Nam MD   My clinic: [facility]        My Rescue Medicine:   Albuterol (Proair/Ventolin/Proventil HFA) 2-4 puffs EVERY 4 HOURS as needed. Use a spacer if recommended by your provider.   My Asthma Severity:   Intermittent/Exercise Induced  Know your asthma triggers: smoke, upper respiratory infections and pollens             GREEN ZONE   Good Control    I feel good    No cough or wheeze    Can work, sleep and play without asthma symptoms     Take your asthma control medicine every day.     1. If exercise triggers your asthma, take your rescue medication    15 minutes before exercise or sports, and    During exercise if you have asthma symptoms  2. Spacer to use with inhaler: If you have a spacer, make sure to use it with your inhaler             YELLOW ZONE Getting Worse  I have ANY of these:    I do not feel good    Cough or wheeze    Chest feels tight    Wake up at night 1. Keep taking your Green Zone medications  2. Start taking your rescue medicine:    every 20 minutes for up to 1 hour. Then every 4 hours for 24-48 hours.  3. If you stay in the Yellow Zone for more than 12-24 hours, contact your doctor.  4. If you do not return to the Green Zone in 12-24 hours or you get worse, start taking your oral steroid medicine if prescribed by your provider.           RED ZONE Medical Alert - Get Help  I have ANY of these:    I feel awful    Medicine is not helping    Breathing getting harder    Trouble walking or talking    Nose opens wide to breathe     1. Take your rescue medicine NOW  2. If your provider has prescribed an oral steroid medicine, start taking it NOW  3. Call your doctor NOW  4. If you are still  in the Red Zone after 20 minutes and you have not reached your doctor:    Take your rescue medicine again and    Call 911 or go to the emergency room right away    See your regular doctor within 2 weeks of an Emergency Room or Urgent Care visit for follow-up treatment.          Annual Reminders:  Meet with Asthma Educator,  Flu Shot in the Fall, consider Pneumonia Vaccination for patients with asthma (aged 19 and older).    Pharmacy:   [id number] - [place], [place] - [address] AT [facility] [address]  [address]  [facility] [address]  Phone: [phone] Fax: [phone]    [facility] [address] [place], [place] - [address] [place] [address] ROAD  [address] [place] [address]  Phone: [phone] Fax: [phone]      Electronically signed by Gogo Nam MD   Date: 04/14/21                      Asthma Triggers  How To Control Things That Make Your Asthma Worse    Triggers are things that make your asthma worse.  Look at the list below to help you find your triggers and what you can do about them.  You can help prevent asthma flare-ups by staying away from your triggers.      Trigger                                                          What you can do   Cigarette Smoke  Tobacco smoke can make asthma worse. Do not allow smoking in your home, car or around you.  Be sure no one smokes at a val day care or school.  If you smoke, ask your health care provider for ways to help you quit.  Ask family members to quit too.  Ask your health care provider for a referral to Quit Plan to help you quit smoking, or call 5-419-986-PLAN.     Colds, Flu, Bronchitis  These are common triggers of asthma. Wash your hands often.  Dont touch your eyes, nose or mouth.  Get a flu shot every year.     Dust Mites  These are tiny bugs that live in cloth or carpet. They are too small to see. Wash sheets and blankets in hot water every week.   Encase pillows and mattress in dust mite proof covers.  Avoid having  carpet if you can. If you have carpet, vacuum weekly.   Use a dust mask and HEPA vacuum.   Pollen and Outdoor Mold  Some people are allergic to trees, grass, or weed pollen, or molds. Try to keep your windows closed.  Limit time out doors when pollen count is high.   Ask you health care provider about taking medicine during allergy season.     Animal Dander  Some people are allergic to skin flakes, urine or saliva from pets with fur or feathers. Keep pets with fur or feathers out of your home.    If you cant keep the pet outdoors, then keep the pet out of your bedroom.  Keep the bedroom door closed.  Keep pets off cloth furniture and away from stuffed toys.     Mice, Rats, and Cockroaches  Some people are allergic to the waste from these pests.   Cover food and garbage.  Clean up spills and food crumbs.  Store grease in the refrigerator.   Keep food out of the bedroom.   Indoor Mold  This can be a trigger if your home has high moisture. Fix leaking faucets, pipes, or other sources of water.   Clean moldy surfaces.  Dehumidify basement if it is damp and smelly.   Smoke, Strong Odors, and Sprays  These can reduce air quality. Stay away from strong odors and sprays, such as perfume, powder, hair spray, paints, smoke incense, paint, cleaning products, candles and new carpet.   Exercise or Sports  Some people with asthma have this trigger. Be active!  Ask your doctor about taking medicine before sports or exercise to prevent symptoms.    Warm up for 5-10 minutes before and after sports or exercise.     Other Triggers of Asthma  Cold air:  Cover your nose and mouth with a scarf.  Sometimes laughing or crying can be a trigger.  Some medicines and food can trigger asthma.

## 2021-06-21 NOTE — PROGRESS NOTES
[facility] Daily Progress     Patient Name: Demetrice Garcia  Date: 11/16/2018  Visit #: 5/16  Evaluation dates: 11/1/18  PTA #1  Referral Diagnosis: Status post arthroscopy of shoulder  Referring provider: Yadi Novak PA-C  Visit Diagnosis:     ICD-10-CM    1. Acute post-operative pain G89.18    2. Shoulder stiffness, right M25.611    3. Decreased strength R53.1    4. S/P rotator cuff repair Z98.890        Precautions / Restrictions : Sling untill follow up at least until next MD follow up in 2 weeks, No active range, AAROM and PROM per order.       Assessment:     Response to Intervention:  Tolerated treatment well.  Improving PROM.  Pt demonstrates increased R shoulder flexion and ER PROM.    Symptoms are consistent with:  Medical diagnosis.  Patient is appropriate to continue with skilled physical therapy intervention, as indicated by initial plan of care.    Goal Status:  Pt. will demonstrate/verbalize independence in self-management of condition in : 12 weeks  Pt. will be independent with home exercise program in : 12 weeks  Pt. will have improved quality of sleep: with less pain;waking less times/night;in 6 weeks  Pt. will improve posture : and demonstrate posture with minimal to no cuing;in 6 weeks  Patient will reach / maintain arm movement: forward;overhead;behind;for home chores;for dressing;with partial ROM;in 4 weeks    No Data Recorded  Other functional progress:          Plan / Patient Education:     Continue with initial plan of care.  Progress with home program as tolerated.  Per protocol    Subjective:  Pt saw her surgeon on Tuesday 11/13.  Pt has been icing after her shoulder after PT.  Pt has been weaning from the sling per MD. She states she will still wear occasionally.  Pain Rating:  Resting 3  Activity:  4    Response to last treatment: sore for a while but overall feeling good.  HEP- Frequency: 2-3x/day, Questions or difficulties:  none.    Patient reports:      Pt reports  her shoulder is a little more sore today.      Objective:            Manual Therapy  MFR right: deltoid, upper trap, pec major/minor, biceps.    Exercises:  Exercise #1: review and reinstruction for Jeffy's 20 reps each direction with therapist demonstration, verbal and tactile cuing.  written/picture instructions provided.  Exercise #2: elbow flexion/extension   Comment #2: x 10 each  Exercise #3: wrist flexion/extension, pronation/supination   Comment #3: x 10 each  Exercise #4: PROM shoulder all directions     PROM painfree  Flexion 140  Abduction 110  IR 40  ER 30  Treatment Today    TREATMENT MINUTES COMMENTS   Evaluation     Self-care/ Home management     Manual therapy 15 See above.   Neuromuscular Re-education     Therapeutic Activity     Therapeutic Exercises 10 See exercise flow-sheet for details.   Added to HEP (per MD)  -wall walks for flexion x10 2x/day   Gait training     Modality__________________                Total 25    Blank areas are intentional and mean the treatment did not include these items.       Teresa Rivas, PTA,CLT  11/16/2018

## 2021-06-21 NOTE — PROGRESS NOTES
[facility] Daily Progress     Patient Name: Demetrice Garcia  Date: 11/8/2018  Visit #: 3/16  Evaluation dates: 11/1/18  Referral Diagnosis: Status post arthroscopy of shoulder  Referring provider: Yadi Novak PA-C  Visit Diagnosis:     ICD-10-CM    1. Acute post-operative pain G89.18    2. Shoulder stiffness, right M25.611    3. Decreased strength R53.1    4. S/P rotator cuff repair Z98.890        Precautions / Restrictions : Sling untill follow up at least until next MD follow up in 2 weeks, No active range, AAROM and PROM per order.     Assessment:     Response to Intervention:  Tolerated treatment well.  Improving PROM.    Symptoms are consistent with:  Medical diagnosis.  Patient is appropriate to continue with skilled physical therapy intervention, as indicated by initial plan of care.    Goal Status:  Pt. will demonstrate/verbalize independence in self-management of condition in : 12 weeks  Pt. will be independent with home exercise program in : 12 weeks  Pt. will have improved quality of sleep: with less pain;waking less times/night;in 6 weeks  Pt. will improve posture : and demonstrate posture with minimal to no cuing;in 6 weeks  Patient will reach / maintain arm movement: forward;overhead;behind;for home chores;for dressing;with partial ROM;in 4 weeks  No Data Recorded  Other functional progress:          Plan / Patient Education:     Continue with initial plan of care.  Progress with home program as tolerated.  Per protocol    Subjective:     Pain Rating:  Resting 2  Activity:  4    Response to last treatment: sore for a while but overall feeling good.  HEP- Frequency: 2-3x/day, Questions or difficulties:  none.    Patient reports:      Her shoulder is feeling good.      Objective:            Manual Therapy  MFR right: deltoid, upper trap, pec major/minor, biceps.    Exercises:  Exercise #1: review and reinstruction for Jeffy's 20 reps each direction with therapist demonstration, verbal  and tactile cuing.  written/picture instructions provided.  Exercise #2: elbow flexion/extension   Comment #2: x 10 each  Exercise #3: wrist flexion/extension, pronation/supination   Comment #3: x 10 each  Exercise #4: PROM shoulder all directions       Treatment Today    TREATMENT MINUTES COMMENTS   Evaluation     Self-care/ Home management     Manual therapy 15 See above.   Neuromuscular Re-education     Therapeutic Activity     Therapeutic Exercises 10 See exercise flow-sheet for details.    Gait training     Modality__________________                Total 25    Blank areas are intentional and mean the treatment did not include these items.       Rex Mendoza, PT  11/8/2018

## 2021-06-21 NOTE — PROGRESS NOTES
[facility] Daily Progress     Patient Name: Demetrice Garcia  Date: 11/23/2018  Visit #: 7/16  Evaluation dates: 11/1/18  PTA #1  Referral Diagnosis: Status post arthroscopy of shoulder  Referring provider: Yadi Novak PA-C  Visit Diagnosis:     ICD-10-CM    1. Acute post-operative pain G89.18    2. Shoulder stiffness, right M25.611    3. Decreased strength R53.1    4. S/P rotator cuff repair Z98.890        Precautions / Restrictions : Sling untill follow up at least until next MD follow up in 2 weeks, No active range, AAROM and PROM per order.       Assessment:     Response to Intervention:  Tolerated treatment well.     Symptoms are consistent with:  Medical diagnosis.  Patient is appropriate to continue with skilled physical therapy intervention, as indicated by initial plan of care.    Goal Status:  Pt. will demonstrate/verbalize independence in self-management of condition in : 12 weeks  Pt. will be independent with home exercise program in : 12 weeks  Pt. will have improved quality of sleep: with less pain;waking less times/night;in 6 weeks  Pt. will improve posture : and demonstrate posture with minimal to no cuing;in 6 weeks  Patient will reach / maintain arm movement: forward;overhead;behind;for home chores;for dressing;with partial ROM;in 4 weeks    No Data Recorded  Other functional progress:          Plan / Patient Education:     Continue with initial plan of care.  Progress with home program as tolerated.  Per protocol      Subjective:     Pain Rating:  Resting 2  Activity:  4    Response to last treatment: sore for a while but overall feeling good.  HEP- Frequency: 2-3x/day, Questions or difficulties:  none.    Patient reports:      She feels like things are getting better and better.      Objective:            Manual Therapy  MFR right: deltoid, upper trap, pec major/minor, biceps.    Exercises:  Exercise #1: review and reinstruction for Jeffy's 20 reps each direction with therapist  demonstration, verbal and tactile cuing.  written/picture instructions provided.  Exercise #2: elbow flexion/extension   Comment #2: x 10 each  Exercise #3: wrist flexion/extension, pronation/supination   Comment #3: x 10 each  Exercise #4: PROM shoulder all directions  Exercise #5: wall walk x 10  Exercise #6: pulleys flexion and abduction  Comment #6: wand AAROM flexion, ER, IR x 10 each     Treatment Today    TREATMENT MINUTES COMMENTS   Evaluation     Self-care/ Home management     Manual therapy 15 See above.   Neuromuscular Re-education     Therapeutic Activity     Therapeutic Exercises 10 See exercise flow-sheet for details.   Added to HEP (per MD)  -wall walks for flexion x10 2x/day   Gait training     Modality__________________                Total 25    Blank areas are intentional and mean the treatment did not include these items.       Rex Mendoza, PT,CLT  11/23/2018

## 2021-06-21 NOTE — PROGRESS NOTES
Assessment/plan   Demetrice Garcia is a 73 y.o. female who is new to my practice.    Demetrice was seen today for establish care.    Diagnoses and all orders for this visit:    Encounter to establish care    Vulvodynia s/t lichen sclerosis.  On continuous tramadol.  Reviewed how patient has tolerated tramadol 100 mg daily.  She takes this as two 50 mg tablets each morning (and once per month or less: needs to repeat this dose for max of 6 tabs that day if traveling/prolonged sitting).  Reviewed  record which was not pulling up data so we did call her pharmacy in [place].  It showed she had a fill on 10/18/18 for 30 tablets with Dr. Zaragoza; and previous to this had refills on 5/10/18, 3/27/18, and 1/16/18 with 180 tablets each time.   - Together we signed a controlled substance agreement.    - She is aware of our plan for every 4 months office visit.   - Subject to random urine screens in the future.    - She declined exam today.   -    Sent Rx for #60 tabs with 3 refills of  traMADol (ULTRAM) 50 mg tablet; Take 2 tablets (100 mg total) by mouth daily Can take up to 6 tablets max on days of travel. (2 tabs every 8 hours).         Follow up: q 4 months for chronic pain management    Gogo Nam MD    Subjective:      HPI: Demetrice Garcia is a 73 y.o. female who is here for:    Chief Complaint   Patient presents with     [facility]     medication follow up- would like her tramadol refill for 2-3 months- leaving in January for 2 months       Lichen sclerosis: dx 20 years ago; has vulvodynia as a result. Taking tramadol for 20 years for this. She is able to drive on this medication as she feels it does not cause her any sedation at all.  We talked about the risks with this. She was previously seeing Dr. VEGA and Dr. Cai was previously with [id number] visits.  She was concerned when Dr. Zaragoza recommended a month follow-up visit and was wondering if we could space visits out as they were previously.    Takes 2 tabs of 50mg   "tramadol daily (100mg daily).  Rarely less than once per month if she travels she may need to take 2 tablets every 8 hours with a max of 6 tablets that day if she is traveling with prolonged sitting.  Again she states this is usually less than once per month.  She does have significant travel plans coming up in January and February as she will be going to [place] and [place].       She does have a gynecologist but cannot recall her name or clinic (initially thought the provider worked with [facility], though I do not see records).  She indicated she can provide this information via my chart or a phone call later today.    Has had spine injections 4x at [facility] to try to help with the pain, but now the pain is managed best with the tramadol.  She explains she was recommended at the [facility] to go see a \"super specialist \"though that specialist stated there was nothing she could do for her regarding the pain.  Patient states there has not been any rash or physical concerns, but mostly sensation of pain with sitting for prolonged periods of time.  She declined exam today.  She reports she has also been on Prempro, clobetasol, and Estrace in the years past for this condition which were not helpful.    Establishing care:  Reviewed some of her past problems, which included mild intermittent asthma.  ACT score today 25/25.  Not on medications.  No ER visits since.      She had a history of irritable bowel syndrome though this is better.  She had a history of a very small lung nodule in the left side.  We reviewed her CT report from 2015 which recommended one year follow-up and patient declines.  Her depression is well controlled on duloxetine 60 mg daily and sertraline 100 mg daily.  Her PHQ 9 score is 0.    Review of Systems:  Recent rotator cuff surgery on the right side.  She is going through therapy and has not needed increased tramadol during this time or even immediately postop.  12 point comprehensive review of " "systems was negative except as noted and HPI     Social History:  Social History     Social History Narrative    Lives with her , followed at [place] for vulvodynia and on chronic pain med- tramadol.     2 grown sons    2 grand kids (26 yr old GD and 23 yr old GS)        Gogo Nam MD       Medical History:  Patient Active Problem List   Diagnosis     Lichen Sclerosus Et Atrophicus     Mild Intermittent Asthma     Vulvodynia     Insomnia     Depression     Chronic, continuous use of tramadol for vulvodynia/lichen sclerosis     Past Medical History:   Diagnosis Date     Collagenous colitis 9/4/2018     CXR Lungs Solitary Pulmonary Nodule (___ Cm)      CT from 11/2015: 1.3 x 0.9 cm left lower lobe pulmonary nodule, stable to minimally decreased when compared to 6/24/2014. Recommend one additional follow-up to document 2 years of stability. Pt declines repeat CT as of 11/2018.     Past Surgical History:   Procedure Laterality Date     BREAST BIOPSY Left 2012 ?     Shellfish containing products and Naproxen  Family History   Problem Relation Age of Onset     Pancreatic cancer Father 86       Medications:  Current Outpatient Medications   Medication Sig Dispense Refill     calcium carbonate (OS-TRINY) 600 mg calcium (1,500 mg) tablet Take 1 tablet (600 mg total) by mouth daily. 100 tablet 2     DULoxetine (CYMBALTA) 60 MG capsule TAKE 1 CAPSULE EVERY DAY 90 capsule 1     sertraline (ZOLOFT) 100 MG tablet TAKE 1 TABLET BY MOUTH EVERY DAY 90 tablet 0     traMADol (ULTRAM) 50 mg tablet Take 2 tablets (100 mg total) by mouth daily Can take up to 6 tablets max on days of travel. (2 tabs every 8 hours). 60 tablet 3     No current facility-administered medications for this visit.        Imaging & Labs reviewed this visit: none      Objective:      Vitals:    11/26/18 0932   BP: 110/60   Pulse: 93   Temp: 97.5  F (36.4  C)   TempSrc: Oral   Weight: 135 lb 1.6 oz (61.3 kg)   Height: 5' 3\" (1.6 m)       Physical Exam: "   General: Alert, no acute distress. Well dressed, very pleasant.    HEENT: normocephalic conjunctivae are clear. EOMI  Neck: supple   Lungs: Normal effort  Heart: regular rate  Abdomen: soft   Skin: clear without rash or lesions  Neuro: alert, oriented  Psych: mood good, affect appropriate, good eye contact, insight and judgment intact, normal speech pattern. No SI/HI.       Little interest or pleasure in doing things: Not at all  Feeling down, depressed, or hopeless: Not at all  Trouble falling or staying asleep, or sleeping too much: Not at all  Feeling tired or having little energy: Not at all  Poor appetite or overeating: Not at all  Feeling bad about yourself - or that you are a failure or have let yourself or your family down: Not at all  Trouble concentrating on things, such as reading the newspaper or watching television: Not at all  Moving or speaking so slowly that other people could have noticed. Or the opposite - being so fidgety or restless that you have been moving around a lot more than usual: Not at all  Thoughts that you would be better off dead, or of hurting yourself in some way: Not at all  PHQ-9 Total Score: 0  If you checked off any problems, how difficult have these problems made it for you to do your work, take care of things at home, or get along with other people?: Not difficult at all    Gogo Nam MD

## 2021-06-21 NOTE — PROGRESS NOTES
[facility] Daily Progress     Patient Name: eDmetrice Garcia  Date: 11/26/2018  Visit #: 8/16  Evaluation dates: 11/1/18  PTA #1  Referral Diagnosis: Status post arthroscopy of shoulder  Referring provider: Yadi Novak PA-C  Visit Diagnosis:     ICD-10-CM    1. Acute post-operative pain G89.18    2. Shoulder stiffness, right M25.611    3. Decreased strength R53.1    4. S/P rotator cuff repair Z98.890        Precautions / Restrictions : Sling untill follow up at least until next MD follow up in 2 weeks, No active range, AAROM and PROM per order.       Assessment:     Response to Intervention:  Tolerated treatment well.     Symptoms are consistent with:  Medical diagnosis.  Patient is appropriate to continue with skilled physical therapy intervention, as indicated by initial plan of care.    Goal Status:  Pt. will demonstrate/verbalize independence in self-management of condition in : 12 weeks  Pt. will be independent with home exercise program in : 12 weeks  Pt. will have improved quality of sleep: with less pain;waking less times/night;in 6 weeks  Pt. will improve posture : and demonstrate posture with minimal to no cuing;in 6 weeks  Patient will reach / maintain arm movement: forward;overhead;behind;for home chores;for dressing;with partial ROM;in 4 weeks    No Data Recorded  Other functional progress:          Plan / Patient Education:     Continue with initial plan of care.  Progress with home program as tolerated.  Per protocol      Subjective:     Pain Rating:  Resting 2  Activity:  4    Response to last treatment: sore for a while but overall feeling good.  HEP- Frequency: 2-3x/day, Questions or difficulties:  none.    Patient reports:      She feels like things are getting better and better.      Objective:            Manual Therapy  MFR right: deltoid, upper trap, pec major/minor, biceps.    Exercises:  Exercise #1: review and reinstruction for Jeffy's 20 reps each direction with therapist  demonstration, verbal and tactile cuing.  written/picture instructions provided.  Exercise #2: elbow flexion/extension   Comment #2: x 10 each  Exercise #3: wrist flexion/extension, pronation/supination   Comment #3: x 10 each  Exercise #4: PROM shoulder all directions  Exercise #5: wall walk x 10  Exercise #6: pulleys flexion and abduction  Comment #6: wand AAROM flexion, ER, IR x 10 each     Treatment Today    TREATMENT MINUTES COMMENTS   Evaluation     Self-care/ Home management     Manual therapy 15 See above.   Neuromuscular Re-education     Therapeutic Activity     Therapeutic Exercises 10 See exercise flow-sheet for details.     -wall walks for flexion x10 2x/day   Gait training     Modality__________________                Total 25    Blank areas are intentional and mean the treatment did not include these items.       Rex Mendoza, PT,  11/26/2018

## 2021-06-21 NOTE — LETTER
Letter by Gogo Nam MD at      Author: Gogo Nam MD Service: -- Author Type: --    Filed:  Encounter Date: 11/20/2020 Status: (Other)       My Asthma Action Plan     Name: Demetrice Garcia   YOB: 1945  Date: 11/20/2020   My doctor: Gogo Nam MD   My clinic: [facility]        My Rescue Medicine:   Declines need for Albuterol (Proair/Ventolin/Proventil HFA) 2-4 puffs EVERY 4 HOURS as needed. Use a spacer if recommended by your provider.   My Asthma Severity:   Intermittent/Exercise Induced  Know your asthma triggers: upper respiratory infections             GREEN ZONE   Good Control    I feel good    No cough or wheeze    Can work, sleep and play without asthma symptoms     Take your asthma control medicine every day.     1. If exercise triggers your asthma, take your rescue medication    15 minutes before exercise or sports, and    During exercise if you have asthma symptoms  2. Spacer to use with inhaler: If you have a spacer, make sure to use it with your inhaler             YELLOW ZONE Getting Worse  I have ANY of these:    I do not feel good    Cough or wheeze    Chest feels tight    Wake up at night 1. Keep taking your Green Zone medications  2. Start taking your rescue medicine:    every 20 minutes for up to 1 hour. Then every 4 hours for 24-48 hours.  3. If you stay in the Yellow Zone for more than 12-24 hours, contact your doctor.  4. If you do not return to the Green Zone in 12-24 hours or you get worse, start taking your oral steroid medicine if prescribed by your provider.           RED ZONE Medical Alert - Get Help  I have ANY of these:    I feel awful    Medicine is not helping    Breathing getting harder    Trouble walking or talking    Nose opens wide to breathe     1. Take your rescue medicine NOW  2. If your provider has prescribed an oral steroid medicine, start taking it NOW  3. Call your doctor NOW  4. If you are still  in the Red Zone after 20 minutes and you have not reached your doctor:    Take your rescue medicine again and    Call 911 or go to the emergency room right away    See your regular doctor within 2 weeks of an Emergency Room or Urgent Care visit for follow-up treatment.          Annual Reminders:  Meet with Asthma Educator,  Flu Shot in the Fall, consider Pneumonia Vaccination for patients with asthma (aged 19 and older).    Pharmacy:   [id number] - [place], [place] - [address] AT [facility] [address]  [address]  [facility] [address]  Phone: [phone] Fax: [phone]    [facility] [address] [place], [place] - [address] [place] [address] ROAD  [address] [place] [address]  Phone: [phone] Fax: [phone]      Electronically signed by Gogo Nam MD   Date: 11/20/20                      Asthma Triggers  How To Control Things That Make Your Asthma Worse    Triggers are things that make your asthma worse.  Look at the list below to help you find your triggers and what you can do about them.  You can help prevent asthma flare-ups by staying away from your triggers.      Trigger                                                          What you can do   Cigarette Smoke  Tobacco smoke can make asthma worse. Do not allow smoking in your home, car or around you.  Be sure no one smokes at a val day care or school.  If you smoke, ask your health care provider for ways to help you quit.  Ask family members to quit too.  Ask your health care provider for a referral to Quit Plan to help you quit smoking, or call 1-820-565-PLAN.     Colds, Flu, Bronchitis  These are common triggers of asthma. Wash your hands often.  Dont touch your eyes, nose or mouth.  Get a flu shot every year.     Dust Mites  These are tiny bugs that live in cloth or carpet. They are too small to see. Wash sheets and blankets in hot water every week.   Encase pillows and mattress in dust mite proof covers.  Avoid having  carpet if you can. If you have carpet, vacuum weekly.   Use a dust mask and HEPA vacuum.   Pollen and Outdoor Mold  Some people are allergic to trees, grass, or weed pollen, or molds. Try to keep your windows closed.  Limit time out doors when pollen count is high.   Ask you health care provider about taking medicine during allergy season.     Animal Dander  Some people are allergic to skin flakes, urine or saliva from pets with fur or feathers. Keep pets with fur or feathers out of your home.    If you cant keep the pet outdoors, then keep the pet out of your bedroom.  Keep the bedroom door closed.  Keep pets off cloth furniture and away from stuffed toys.     Mice, Rats, and Cockroaches  Some people are allergic to the waste from these pests.   Cover food and garbage.  Clean up spills and food crumbs.  Store grease in the refrigerator.   Keep food out of the bedroom.   Indoor Mold  This can be a trigger if your home has high moisture. Fix leaking faucets, pipes, or other sources of water.   Clean moldy surfaces.  Dehumidify basement if it is damp and smelly.   Smoke, Strong Odors, and Sprays  These can reduce air quality. Stay away from strong odors and sprays, such as perfume, powder, hair spray, paints, smoke incense, paint, cleaning products, candles and new carpet.   Exercise or Sports  Some people with asthma have this trigger. Be active!  Ask your doctor about taking medicine before sports or exercise to prevent symptoms.    Warm up for 5-10 minutes before and after sports or exercise.     Other Triggers of Asthma  Cold air:  Cover your nose and mouth with a scarf.  Sometimes laughing or crying can be a trigger.  Some medicines and food can trigger asthma.

## 2021-06-21 NOTE — LETTER
Letter by Gogo Nam MD at      Author: Gogo Nam MD Service: -- Author Type: --    Filed:  Encounter Date: 4/14/2021 Status: (Other)       Opioid / Opioid Plus Controlled Substance Agreement  Tramadol 50mg tablets- takes 100mg daily, with PRN during travel 50mg q6hr as needed  [facility] [place] pharmacy- generally call to confirm due to pharmacy not in       This is an agreement between you and your provider about the safe and appropriate use of controlled substance/opioids prescribed by your care team. Controlled substances are medicines that can cause physical and mental dependence (abuse).    There are strict laws about having and using these medicines. We here at [facility] are committing to working with you in your efforts to get better. To support you in this work, well help you schedule regular office appointments for medicine refills. If we must cancel or change your appointment for any reason, well make sure you have enough medicine to last until your next appointment.     As a Provider, I will:    Listen carefully to your concerns and treat you with respect.     Recommend a treatment plan that I believe is in your best interest. This plan may involve therapies other than opioid pain medication.     Talk with you often about the possible benefits, and the risk of harm of any medicine that we prescribe for you.     Provide a plan on how to taper (discontinue or go off) using this medicine if the decision is made to stop its use.    As a Patient, I understand that opioid(s):     Are a controlled substance prescribed by my care team to help me function or work and manage my condition(s).     Are strong medicines and can cause serious side effects such as:    Drowsiness, which can seriously affect my driving ability    A lower breathing rate, enough to cause death    Harm to my thinking ability     Depression     Abuse of and addiction to this medicine    Need to be taken  exactly as prescribed. Combining opioids with certain medicines or chemicals (such as illegal drugs, sedatives, sleeping pills, and benzodiazepines) can be dangerous or even fatal. If I stop opioids suddenly, I may have severe withdrawal symptoms.    Do not work for all types of pain nor for all patients. If theyre not helpful, I may be asked to stop them.        The risks, benefits and side effects of these medicine(s) were explained to me. I agree that:  1. I will take part in other treatments as advised by my care team. This may be psychiatry or counseling, physical therapy, behavioral therapy, group treatment or a referral to a specialist.     2. I will keep all my appointments. I understand that this is part of the monitoring of opioids. My care team may require an office visit for EVERY opioid/controlled substance refill. If I miss appointments or dont follow instructions, my care team may stop my medicine.    3. I will take my medicines as prescribed. I will not change the dose or schedule unless my care team tells me to. There will be no refills if I run out early.     4. I may be asked to come to the clinic and complete a urine drug test or complete a pill count at any time. If I dont give a urine sample or participate in a pill count, the care team may stop my medicine.    5. I will only receive prescriptions from this clinic for chronic pain. If I am treated by another provider for acute pain issues, I will tell them that I am taking opioid pain medication for chronic pain and that I have a treatment agreement with this provider. I will inform my [facility] care team within one business day if I am given a prescription for any pain medication by another healthcare provider. My [facility] care team can contact other providers and pharmacists about my use of any medicines.    6. It is up to me to make sure that I dont run out of my medicines on weekends or holidays. If my care team is willing  to refill my opioid prescription without a visit, I must request refills only during office hours. Refills may take up to 3 business days to process. I will use one pharmacy to fill all my opioid and other controlled substance prescriptions. I will notify the clinic about any changes to my insurance or medication availability.    7. I am responsible for my prescriptions. If the medicine/prescription is lost, stolen or destroyed, it will not be replaced. I also agree not to share controlled substance medicines with anyone.    8. I am aware I should not use any illegal or recreational drugs. I agree not to drink alcohol unless my care team says I can.       9. If I enroll in the [place] Medical Cannabis program, I will tell my care team prior to my next refill.     10. I will tell my care team right away if I become pregnant, have a new medical problem treated outside of my regular clinic, or have a change in my medications.    11. I understand that this medicine can affect my thinking, judgment and reaction time. Alcohol and drugs affect the brain and body, which can affect the safety of my driving. Being under the influence of alcohol or drugs can affect my decision-making, behaviors, personal safety, and the safety of others. Driving while impaired (DWI) can occur if a person is driving, operating, or in physical control of a car, motorcycle, boat, snowmobile, ATV, motorbike, off-road vehicle, or any other motor vehicle ([place] Statute 169A.20). I understand the risk if I choose to drive or operate any vehicle or machinery.    I understand that if I do not follow any of the conditions above, my prescriptions or treatment may be stopped or changed.        Opioids  What You Need to Know    What are opioids?   Opioids are pain medicines that must be prescribed by a doctor. They are also known as narcotics.     Examples are:   1. morphine (MS Contin, Luisa)  2. oxycodone (Oxycontin)  3. oxycodone and acetaminophen  (Percocet)  4. hydrocodone and acetaminophen (Vicodin, Norco)   5. fentanyl patch (Duragesic)   6. hydromorphone (Dilaudid)   7. methadone  8. codeine (Tylenol #3)     What do opioids do well?   Opioids are best for severe short-term pain such as after a surgery or injury. They may work well for cancer pain. They may help some people with long-lasting (chronic) pain.     What do opioids NOT do well?   Opioids never get rid of pain entirely, and they dont work well for most patients with chronic pain. Opioids dont reduce swelling, one of the causes of pain.                              Other ways to manage chronic pain and improve function include:       Treat the health problem that may be causing pain    Anti-inflammation medicines, which reduce swelling and tenderness, such as ibuprofen (Advil, Motrin) or naproxen (Aleve)    Acetaminophen (Tylenol)    Antidepressants and anti-seizure medicines, especially for nerve pain    Topical treatments such as patches or creams    Injections or nerve blocks    Chiropractic or osteopathic treatment    Acupuncture, massage, deep breathing, meditation, visual imagery, aromatherapy    Use heat or ice at the pain site    Physical therapy     Exercise    Stop smoking    Take part in therapy       Risks and side effects     Talk to your doctor before you start or decide to keep taking opioids. Possible side effects include:      Lowering your breathing rate enough to cause death    Overdose, including death, especially if taking higher than prescribed doses    Worse depression symptoms; less pleasure in things you usually enjoy    Feeling tired or sluggish    Slower thoughts or cloudy thinking    Being more sensitive to pain over time; pain is harder to control    Trouble sleeping or restless sleep    Changes in hormone levels (for example, less testosterone)    Changes in sex drive or ability to have sex    Constipation    Unsafe driving    Itching and  sweating    Dizziness    Nausea, throwing up and dry mouth    What else should I know about opioids?    Opioids may lead to dependence, tolerance, or addiction.      Dependence means that if you stop or reduce the medicine too quickly, you will have withdrawal symptoms. These include loose poop (diarrhea), jitters, flu-like symptoms, nervousness and tremors. Dependence is not the same as addiction.                   Tolerance means needing higher doses over time to get the same effect. This may increase the chance of serious side effects.      Addiction is when people improperly use a substance that harms their body, their mind or their relations with others. Use of opiates can cause a relapse of addiction if you have a history of drug or alcohol abuse.      People who have used opioids for a long time may have a lower quality of life, worse depression, higher levels of pain and more visits to doctors.    You can overdose on opioids. Take these steps to lower your risk of overdose:    1. Recognize the signs:  Signs of overdose include decrease or loss of consciousness (blackout), slowed breathing, trouble waking up and blue lips. If someone is worried about overdose, they should call 911.    2. Talk to your doctor about Narcan (naloxone).   If you are at risk for overdose, you may be given a prescription for Narcan. This medicine very quickly reverses the effects of opioids.   If you overdose, a friend or family member can give you Narcan while waiting for the ambulance. They need to know the signs of overdose and how to give Narcan.     3. Don't use alcohol or street drugs.   Taking them with opioids can cause death.    4. Do not take any of these medicines unless your doctor says its OK. Taking these with opioids can cause death:    Benzodiazepines, such as lorazepam (Ativan), alprazolam (Xanax) or diazepam (Valium)    Muscle relaxers, such as cyclobenzaprine (Flexeril)    Sleeping pills like zolpidem (Ambien)      Other opioids      How to keep you and other people safe while taking opioids:    1. Never share your opioids with others.  Opioid medicines are regulated by the Drug Enforcement Agency (ODESSA). Selling or sharing medications is a criminal act.    2. Be sure to store opioids in a secure place, locked up if possible. Young children can easily swallow them and overdose.    3. When you are traveling with your medicines, keep them in the original bottles. If you use a pill box, be sure you also carry a copy of your medicine list from your clinic or pharmacy.    4. Safe disposal of opioids    Most pharmacies have places to get rid of medicine, called disposal kiosks. Medicine disposal options are also available in every [facility]. Search your county and medication disposal to find more options. You can find more details at:  https://www.pca.[facility].[place].us/living-green/managing-unwanted-medications     I agree that my provider, clinic care team, and pharmacy may work with any city, state or federal law enforcement agency that investigates the misuse, sale, or other diversion of my controlled medicine. I will allow my provider to discuss my care with, or share a copy of, this agreement with any other treating provider, pharmacy or emergency room where I receive care.    I have read this agreement and have asked questions about anything I did not understand.      __________________________________________________  Patient Signature - Demetrice Garcia   ______________________                      Date     __________________________________________________  Provider Signature - Gogo Nam MD     ______________________                      Date     __________________________________________________  Witness Signature (required if provider not present while patient signing)     ______________________                      Date

## 2021-06-21 NOTE — LETTER
Letter by Gogo Nam MD at      Author: Gogo Nam MD Service: -- Author Type: --    Filed:  Encounter Date: 11/20/2020 Status: (Other)         [facility]  11/20/20    Patient: Demetrice Garcia  YOB: 1945  Medical Record Number: [id number]  CSN: [id number]                                                                              Non-opioid Controlled Substance Agreement  Tramadol 50mg tablets- takes 100mg daily, with PRN during travel 50mg q6hr as needed  #60 per month normal Rx   [facility] [place] [facility]- generally call to confirm due to pharmacy not in   Follow up q4-6 months    I understand that my care provider has prescribed a controlled substance to help manage my condition(s). I am taking this medicine to help me function or work. I know this is strong medicine, and that it can cause serious side effects. Controlled substances can be sedating, addicting and may cause a dependency on the drug. They can affect my ability to drive or think, and cause depression. They need to be taken exactly as prescribed. Combining controlled substances with certain medicines or chemicals (such as cocaine, sedatives and tranquilizers, sleeping pills, meth) can be dangerous or even fatal. Also, if I stop controlled substances suddenly, I may have severe withdrawal symptoms.  If not helpful, I may be asked to stop them.    The risks, benefits, and side effects of these medicine(s) were explained to me. I agree that:    1. I will take part in other treatments as advised by my care team. This may be psychiatry or counseling, physical therapy, behavioral therapy, group treatment or a referral to a pain clinic. I will reduce or stop my medicine when my care team tells me to do so.  2. I will take my medicines as prescribed. I will not change the dose or schedule unless my care team tells me to. There will be no refills if I run out early.  I may be contactedwithout  warning and asked to complete a urine drug test or pill count at any time.   3. I will keep all my appointments, and understand this is part of the monitoring of controlled substances. My care team may require an office visit for EVERY controlled substance refill. If I miss appointments or dont follow instructions, my care team may stop my medicine.  4. I will not ask other providers to prescribe controlled substances, and I will not accept controlled substances from other people. If I need another prescribed controlled substance for a new reason, I will tell my care team within 1 business day.  5. I will use one pharmacy to fill all of my controlled substance prescriptions, and it is up to me to make sure that I do not run out of my medicines on weekends or holidays. If my care team is willing to refill my controlled substance prescription without a visit, I must request refills only during office hours, refills may take up to 3 days to process, and it may take up to 5 to 7 days for my medicine to be mailed and ready at my pharmacy. Prescriptions will not be mailed anywhere except my pharmacy.    6. I am responsible for my prescriptions. If the medicine/prescription is lost or stolen, it will not be replaced. I also agree not to share controlled substance medicines with anyone.          [facility] [place]  11/20/20  Patient:  Demetrice Garcia  YOB: 1945  Medical Record Number: [id number]  CSN: [id number]    7. I agree to not use ANY illegal or recreational drugs. This includes marijuana, cocaine, bath salts or other drugs. I agree not to use alcohol unless my care team says I may. I agree to give urine samples whenever asked. If I dont give a urine sample, the care team may stop my medicine.    8. If I enroll in the [place] Medical Marijuana program, I will tell my care team. I will also sign an agreement to share my medical records with my care team.    9. I will bring in my list of  medicines (or my medicine bottles) each time I come to the clinic.   10. I will tell my care team right away if I become pregnant or have a new medical problem treated outside of my regular clinic.  11. I understand that this medicine can affect my thinking and judgment. It may be unsafe for me to drive, use machinery and do dangerous tasks. I will not do any of these things until I know how the medicine affects me. If my dose changes, I will wait to see how it affects me. I will contact my care team if I have concerns about medicine side effects.    I understand that if I do not follow any of the conditions above, my prescriptions or treatment may be stopped.      I agree that my provider, clinic care team, and pharmacy may work with any city, state or federal law enforcement agency that investigates the misuse, sale, or other diversion of my controlled medicine. I will allow my provider to discuss my care with or share a copy of this agreement with any other treating provider, pharmacy or emergency room where I receive care. I agree to give up (waive) any right of privacy or confidentiality with respect to these consents.   I have read this agreement and have asked questions about anything I did not understand.    ___________________________________________________________________________  Patient signature - Date/Time  -Demetrice Garcia                                      ___________________________________________________________________________  Witness signature                                                                    ___________________________________________________________________________  Provider signature- Gogo Nam MD

## 2021-06-21 NOTE — PROGRESS NOTES
[facility] Daily Progress     Patient Name: Demetrice Garcia  Date: 11/19/2018  Visit #: 6/16  Evaluation dates: 11/1/18  PTA #1  Referral Diagnosis: Status post arthroscopy of shoulder  Referring provider: Yadi Novak PA-C  Visit Diagnosis:     ICD-10-CM    1. Acute post-operative pain G89.18    2. Shoulder stiffness, right M25.611    3. Decreased strength R53.1    4. S/P rotator cuff repair Z98.890        Precautions / Restrictions : Sling untill follow up at least until next MD follow up in 2 weeks, No active range, AAROM and PROM per order.       Assessment:     Response to Intervention:  Tolerated treatment well.     Symptoms are consistent with:  Medical diagnosis.  Patient is appropriate to continue with skilled physical therapy intervention, as indicated by initial plan of care.    Goal Status:  Pt. will demonstrate/verbalize independence in self-management of condition in : 12 weeks  Pt. will be independent with home exercise program in : 12 weeks  Pt. will have improved quality of sleep: with less pain;waking less times/night;in 6 weeks  Pt. will improve posture : and demonstrate posture with minimal to no cuing;in 6 weeks  Patient will reach / maintain arm movement: forward;overhead;behind;for home chores;for dressing;with partial ROM;in 4 weeks    No Data Recorded  Other functional progress:          Plan / Patient Education:     Continue with initial plan of care.  Progress with home program as tolerated.  Per protocol      Subjective:     Pain Rating:  Resting 2  Activity:  4    Response to last treatment: sore for a while but overall feeling good.  HEP- Frequency: 2-3x/day, Questions or difficulties:  none.    Patient reports:      Feeling good.    Saw MD, weaning off sling, increasing activity.      Objective:            Manual Therapy  MFR right: deltoid, upper trap, pec major/minor, biceps.    Exercises:  Exercise #1: review and reinstruction for Jeffy's 20 reps each direction with  therapist demonstration, verbal and tactile cuing.  written/picture instructions provided.  Exercise #2: elbow flexion/extension   Comment #2: x 10 each  Exercise #3: wrist flexion/extension, pronation/supination   Comment #3: x 10 each  Exercise #4: PROM shoulder all directions  Exercise #5: wall walk x 10  Exercise #6: pulleys flexion and abduction  Comment #6: wand AAROM flexion, ER, IR x 10 each     Treatment Today    TREATMENT MINUTES COMMENTS   Evaluation     Self-care/ Home management     Manual therapy 15 See above.   Neuromuscular Re-education     Therapeutic Activity     Therapeutic Exercises 10 See exercise flow-sheet for details.   Added to HEP (per MD)  -wall walks for flexion x10 2x/day   Gait training     Modality__________________                Total 25    Blank areas are intentional and mean the treatment did not include these items.       Rex Mendoza, PT,CLT  11/19/2018

## 2021-06-22 NOTE — PROGRESS NOTES
[facility] Daily Progress     Patient Name: Demetrice Garcia  Date: 12/18/2018  Visit #: 11/16  Evaluation dates: 12/1/18  PTA #3  Referral Diagnosis: Status post arthroscopy of shoulder  Referring provider: Yadi Novak PA-C  Visit Diagnosis:     ICD-10-CM    1. Acute post-operative pain G89.18    2. Shoulder stiffness, right M25.611    3. S/P rotator cuff repair Z98.890    4. Decreased strength R53.1        Precautions / Restrictions : Sling untill follow up at least until next MD follow up in 2 weeks, No active range, AAROM and PROM per order.       Assessment:     Response to Intervention:  Tolerated treatment well.  AROM well tolerated       Pt has been compliant with her HEP and ice.  Symptoms are consistent with:  Medical diagnosis.  Patient is appropriate to continue with skilled physical therapy intervention, as indicated by initial plan of care.    Goal Status:  Pt. will demonstrate/verbalize independence in self-management of condition in : 12 weeks  Pt. will be independent with home exercise program in : 12 weeks  Pt. will have improved quality of sleep: with less pain;waking less times/night;in 6 weeks  Pt. will improve posture : and demonstrate posture with minimal to no cuing;in 6 weeks  Patient will reach / maintain arm movement: forward;overhead;behind;for home chores;for dressing;with partial ROM;in 4 weeks    No Data Recorded  Other functional progress:          Plan / Patient Education:     Continue with initial plan of care.  Progress with home program as tolerated.  Per protocol  Pt to follow up with the surgeon next week 12/12.  Pt instructed to discontinue ironing with her R UE. Pt to focus on her exercises and ice.      Subjective:     Pain Rating: 3    Demetrice's shoulder continues to improve.  She is following up with ortho's office today.      HEP- Frequency: 2-3x/day, Questions or difficulties:  none.      Objective:         Exercises:  Exercise #1: review and reinstruction  "for Jeffy's 20 reps each direction with therapist demonstration, verbal and tactile cuing.  written/picture instructions provided.  Exercise #2: elbow flexion/extension   Comment #2: x 10 each  Exercise #3: wrist flexion/extension, pronation/supination   Comment #3: x 10 each  Exercise #4: PROM shoulder all directions  Exercise #5: wall walk x 10  Exercise #6: pulleys flexion and abduction  Comment #6: wand AAROM flexion, ER, IR x 10 each  Exercise #7: UBE  Comment #7: 3 minutes forward, 3 minutes backward  Exercise #8: flexion/ER in doorway  Comment #8: 30\" x 2 right  Exercise #9: towel IR stretch  Comment #9: 30\" x 2 right  Exercise #10: ER stretch in doorway with towel under arm  Comment #10: 30\" x 2 right  Exercise #11: AROM --flexion, scaption, ER  Comment #11: 15 each     Treatment Today    TREATMENT MINUTES COMMENTS   Evaluation     Self-care/ Home management     Manual therapy 15   MFR/STM to deltoids,  pec major and minor,    Neuromuscular Re-education     Therapeutic Activity     Therapeutic Exercises 15 See exercise flow-sheet for details.    Gait training     Modality__________________                Total 30    Blank areas are intentional and mean the treatment did not include these items.       Rex Mendoza, PT,  12/18/2018     "

## 2021-06-22 NOTE — PROGRESS NOTES
[facility] Daily Progress     Patient Name: Demetrice Garcia  Date: 2018  Visit #:   Evaluation dates: 18  PTA #3  Referral Diagnosis: Status post arthroscopy of shoulder  Referring provider: Yadi Novak PA-C  Visit Diagnosis:     ICD-10-CM    1. Acute post-operative pain G89.18    2. Shoulder stiffness, right M25.611    3. S/P rotator cuff repair Z98.890    4. Decreased strength R53.1        Precautions / Restrictions : Sling untill follow up at least until next MD follow up in 2 weeks, No active range, AAROM and PROM per order.       Assessment:     Response to Intervention:  Tolerated treatment well.  AROM well tolerated       Pt has been compliant with her HEP and ice.  Symptoms are consistent with:  Medical diagnosis.  Patient is appropriate to continue with skilled physical therapy intervention, as indicated by initial plan of care.    Goal Status:  Pt. will demonstrate/verbalize independence in self-management of condition in : 12 weeks  Pt. will be independent with home exercise program in : 12 weeks  Pt. will have improved quality of sleep: with less pain;waking less times/night;in 6 weeks  Pt. will improve posture : and demonstrate posture with minimal to no cuing;in 6 weeks  Patient will reach / maintain arm movement: forward;overhead;behind;for home chores;for dressing;with partial ROM;in 4 weeks    No Data Recorded  Other functional progress:          Plan / Patient Education:     Continue with initial plan of care.  Progress with home program as tolerated.  Per protocol    Subjective:     Pain Ratin- 3    Things are going well per patient.  Less soreness overall.  ROM is improving.      HEP- Frequency: 2-3x/day, Questions or difficulties:  none.      Objective:     Shoulder/Elbow ROM  Date: 18    Shoulder and Elbow ROM ( )   AROM in degrees    Right Left   Shoulder Flexion (0-180 ) 145 160   Shoulder Abduction (0-180 ) 140 160   Shoulder Extension (0-60 )    "  Shoulder ER (0-90 ) 50 70   Shoulder IR (0-70 )     Shoulder IR/EXT T11 T8   Elbow Flexion (150 ) WNL WNL   Elbow Extension (0 ) WNL WNL         Exercises:  Exercise #1: review and reinstruction for Jeffy's 20 reps each direction with therapist demonstration, verbal and tactile cuing.  written/picture instructions provided.  Exercise #2: elbow flexion/extension   Comment #2: x 10 each  Exercise #3: wrist flexion/extension, pronation/supination   Comment #3: x 10 each  Exercise #4: PROM shoulder all directions  Exercise #5: wall walk x 10  Exercise #6: pulleys flexion and abduction  Comment #6: wand AAROM flexion, ER, IR x 10 each  Exercise #7: UBE  Comment #7: 3 minutes forward, 3 minutes backward  Exercise #8: flexion/ER in doorway  Comment #8: 30\" x 2 right  Exercise #9: towel IR stretch  Comment #9: 30\" x 2 right  Exercise #10: ER stretch in doorway with towel under arm  Comment #10: 30\" x 2 right  Exercise #11: AROM --flexion, scaption, ER  Comment #11: 15 each     Treatment Today    TREATMENT MINUTES COMMENTS   Evaluation     Self-care/ Home management     Manual therapy 25   MFR/STM to deltoids,  pec major and minor,    Neuromuscular Re-education     Therapeutic Activity     Therapeutic Exercises 5 See exercise flow-sheet for details.    Gait training     Modality__________________                Total 30    Blank areas are intentional and mean the treatment did not include these items.       Rex Mendoza, PT,  12/21/2018     "

## 2021-06-22 NOTE — PROGRESS NOTES
[facility] Daily Progress     Patient Name: Demetrice Garcia  Date: 12/14/2018  Visit #: 11/16  Evaluation dates: 12/1/18  PTA #3  Referral Diagnosis: Status post arthroscopy of shoulder  Referring provider: Yadi Novak PA-C  Visit Diagnosis:     ICD-10-CM    1. Acute post-operative pain G89.18    2. Shoulder stiffness, right M25.611    3. S/P rotator cuff repair Z98.890    4. Decreased strength R53.1        Precautions / Restrictions : Sling untill follow up at least until next MD follow up in 2 weeks, No active range, AAROM and PROM per order.       Assessment:     Response to Intervention:  Tolerated treatment well.   Maybe over doing it at home.   Pt has been compliant with her HEP and ice.  Symptoms are consistent with:  Medical diagnosis.  Patient is appropriate to continue with skilled physical therapy intervention, as indicated by initial plan of care.    Goal Status:  Pt. will demonstrate/verbalize independence in self-management of condition in : 12 weeks  Pt. will be independent with home exercise program in : 12 weeks  Pt. will have improved quality of sleep: with less pain;waking less times/night;in 6 weeks  Pt. will improve posture : and demonstrate posture with minimal to no cuing;in 6 weeks  Patient will reach / maintain arm movement: forward;overhead;behind;for home chores;for dressing;with partial ROM;in 4 weeks    No Data Recorded  Other functional progress:          Plan / Patient Education:     Continue with initial plan of care.  Progress with home program as tolerated.  Per protocol  Pt to follow up with the surgeon next week 12/12.  Pt instructed to discontinue ironing with her R UE. Pt to focus on her exercises and ice.      Subjective:     Pain Rating: 3    Shoulder is feeling better. Her  is doing the dishes and taking on more vigorous household tasks.      HEP- Frequency: 2-3x/day, Questions or difficulties:  none.      Objective:         Exercises:  Exercise #1:  "review and reinstruction for Jeffy's 20 reps each direction with therapist demonstration, verbal and tactile cuing.  written/picture instructions provided.  Exercise #2: elbow flexion/extension   Comment #2: x 10 each  Exercise #3: wrist flexion/extension, pronation/supination   Comment #3: x 10 each  Exercise #4: PROM shoulder all directions  Exercise #5: wall walk x 10  Exercise #6: pulleys flexion and abduction  Comment #6: wand AAROM flexion, ER, IR x 10 each  Exercise #7: UBE  Comment #7: 3 minutes forward, 3 minutes backward  Exercise #8: flexion/ER in doorway  Comment #8: 30\" x 2 right  Exercise #9: towel IR stretch  Comment #9: 30\" x 2 right  Exercise #10: ER stretch in doorway with towel under arm  Comment #10: 30\" x 2 right     Treatment Today    TREATMENT MINUTES COMMENTS   Evaluation     Self-care/ Home management     Manual therapy 5   MFR/STM to deltoids,  pec major and minor,    Neuromuscular Re-education     Therapeutic Activity     Therapeutic Exercises 25 See exercise flow-sheet for details.    Gait training     Modality__________________                Total 30    Blank areas are intentional and mean the treatment did not include these items.       Rex Mendoza, PT,  12/14/2018     "

## 2021-06-22 NOTE — PROGRESS NOTES
[facility] Daily Progress     Patient Name: Demetrice Garcia  Date: 12/3/2018  Visit #:   Evaluation dates: 18  PTA #2  Referral Diagnosis: Status post arthroscopy of shoulder  Referring provider: Yadi Novak PA-C  Visit Diagnosis:     ICD-10-CM    1. Acute post-operative pain G89.18    2. Shoulder stiffness, right M25.611    3. Decreased strength R53.1    4. S/P rotator cuff repair Z98.890        Precautions / Restrictions : Sling untill follow up at least until next MD follow up in 2 weeks, No active range, AAROM and PROM per order.       Assessment:     Response to Intervention:  Tolerated treatment well.     Symptoms are consistent with:  Medical diagnosis.  Patient is appropriate to continue with skilled physical therapy intervention, as indicated by initial plan of care.    Goal Status:  Pt. will demonstrate/verbalize independence in self-management of condition in : 12 weeks  Pt. will be independent with home exercise program in : 12 weeks  Pt. will have improved quality of sleep: with less pain;waking less times/night;in 6 weeks  Pt. will improve posture : and demonstrate posture with minimal to no cuing;in 6 weeks  Patient will reach / maintain arm movement: forward;overhead;behind;for home chores;for dressing;with partial ROM;in 4 weeks    No Data Recorded  Other functional progress:          Plan / Patient Education:     Continue with initial plan of care.  Progress with home program as tolerated.  Per protocol  Pt to follow up with the surgeon mid December.      Subjective:     Pain Ratin      Response to last treatment: more sore    HEP- Frequency: 2-3x/day, Questions or difficulties:  none.      Still having increased soreness.          Objective:        myofascial restriction: right scalenes, upper trap , pec major/minor, deltoid, biceps.    Manual Therapy  MFR right: deltoid, upper trap, pec major/minor, biceps., scalenes.    Manual therapy:  Cervical longitudinal  mobilization    Rate/grade Target  Direction  Relative movement Location in range Patient position   2 Cervical vertebrae Superior Distraction of facet joints Head in neutral Supine        Manual therapy:  right shoulder inferior mobilization    Rate/grade Target  Direction  Relative movement Location in range Patient position   2 Humeral head Inferior  Inferior glide of humeral head on glenoid. Varying degrees of shoulder abduction from neutral to end-range. Supine       Manual therapy:  right shoulder posterior mobilization    Rate/grade Target  Direction  Relative movement Location in range Patient position   2 Humeral head Posterior  Posterior glide of humeral head on glenoid. 90 degrees of shoulder abduction and in varying degrees of IR from neutral to end-range. Supine           Exercises:  Exercise #1: review and reinstruction for Codman's 20 reps each direction with therapist demonstration, verbal and tactile cuing.  written/picture instructions provided.  Exercise #2: elbow flexion/extension   Comment #2: x 10 each  Exercise #3: wrist flexion/extension, pronation/supination   Comment #3: x 10 each  Exercise #4: PROM shoulder all directions  Exercise #5: wall walk x 10  Exercise #6: pulleys flexion and abduction  Comment #6: wand AAROM flexion, ER, IR x 10 each     Treatment Today    TREATMENT MINUTES COMMENTS   Evaluation     Self-care/ Home management     Manual therapy 15 See above.   Neuromuscular Re-education     Therapeutic Activity     Therapeutic Exercises 10 See exercise flow-sheet for details.        Gait training     Modality__________________                Total 25    Blank areas are intentional and mean the treatment did not include these items.       Rex Mendoza, PT,  12/3/2018

## 2021-06-22 NOTE — PROGRESS NOTES
"[facility] Daily Progress     Patient Name: Demetrice Garcia  Date: 2018  Visit #:   Evaluation dates: 18  PTA #3  Referral Diagnosis: Status post arthroscopy of shoulder  Referring provider: Yadi Novak PA-C  Visit Diagnosis:     ICD-10-CM    1. Acute post-operative pain G89.18    2. Shoulder stiffness, right M25.611    3. Decreased strength R53.1    4. S/P rotator cuff repair Z98.890        Precautions / Restrictions : Sling untill follow up at least until next MD follow up in 2 weeks, No active range, AAROM and PROM per order.       Assessment:     Response to Intervention:  Tolerated treatment well.   Maybe over doing it at home.   Pt has been compliant with her HEP and ice.  Symptoms are consistent with:  Medical diagnosis.  Patient is appropriate to continue with skilled physical therapy intervention, as indicated by initial plan of care.    Goal Status:  Pt. will demonstrate/verbalize independence in self-management of condition in : 12 weeks  Pt. will be independent with home exercise program in : 12 weeks  Pt. will have improved quality of sleep: with less pain;waking less times/night;in 6 weeks  Pt. will improve posture : and demonstrate posture with minimal to no cuing;in 6 weeks  Patient will reach / maintain arm movement: forward;overhead;behind;for home chores;for dressing;with partial ROM;in 4 weeks    No Data Recorded  Other functional progress:          Plan / Patient Education:     Continue with initial plan of care.  Progress with home program as tolerated.  Per protocol  Pt to follow up with the surgeon next week .  Pt instructed to discontinue ironing with her R UE. Pt to focus on her exercises and ice.      Subjective:     Pain Ratin  \" Not good.\" Pt states her shoulder has been more sore.   Pt reports she has been using her R UE to do some things around her home. She has been ironing, washing dishes and doing laundry.     Response to last treatment: about " the same    HEP- Frequency: 2-3x/day, Questions or difficulties:  none.      Objective:        myofascial restriction: right scalenes, upper trap , pec major/minor, deltoid, biceps.          R shoulder PROM:  Flexion: 160 degrees  AB: 95 degrees  ER: 45 degrees      Exercises:  Exercise #1: review and reinstruction for Jeffy's 20 reps each direction with therapist demonstration, verbal and tactile cuing.  written/picture instructions provided.  Exercise #2: elbow flexion/extension   Comment #2: x 10 each  Exercise #3: wrist flexion/extension, pronation/supination   Comment #3: x 10 each  Exercise #4: PROM shoulder all directions  Exercise #5: wall walk x 10  Exercise #6: pulleys flexion and abduction  Comment #6: wand AAROM flexion, ER, IR x 10 each     Treatment Today    TREATMENT MINUTES COMMENTS   Evaluation     Self-care/ Home management     Manual therapy 15 PROM to R shoulder all planes  MFR/STM to deltoids, biceps, pec major,    Neuromuscular Re-education     Therapeutic Activity     Therapeutic Exercises 10 See exercise flow-sheet for details.        Gait training     Modality__________________                Total 25    Blank areas are intentional and mean the treatment did not include these items.       Teresa Rivas, PTA,CLT  12/7/2018

## 2021-06-22 NOTE — PROGRESS NOTES
[facility] Discharge Summary  Patient Name: Demetrice Garcia  Date: 2/18/2019  Referral Diagnosis: Status post arthroscopy of shoulder  Referring provider: Yadi Novak PA-C  Visit Diagnosis:   1. Acute post-operative pain     2. Shoulder stiffness, right     3. S/P rotator cuff repair     4. Decreased strength         Goals:  No Data Recorded  No Data Recorded    Patient was seen for 13 visits physical therapy.    The patient attended therapy initially, but did not finish the therapy sessions prescribed.  Goals were not fully achieved. Explanation for goals not achieved: The patient discontinued therapy, did not return.    Therapy will be discontinued at this time.  Please see progress note dated 12/28/18 for patient status.      Thank you for your referral.  Rex Mendoza, PT  2/18/2019  5:24 PM       [facility] Daily Progress     Patient Name: Demetrice Garcia  Date: 12/28/2018  Visit #: 13/16  Evaluation dates: 12/1/18  PTA #3  Referral Diagnosis: Status post arthroscopy of shoulder  Referring provider: Yadi Novak PA-C  Visit Diagnosis:     ICD-10-CM    1. Acute post-operative pain G89.18    2. Shoulder stiffness, right M25.611    3. S/P rotator cuff repair Z98.890    4. Decreased strength R53.1        Precautions / Restrictions : Sling untill follow up at least until next MD follow up in 2 weeks, No active range, AAROM and PROM per order.       Assessment:     Response to Intervention:  Tolerated treatment well.  AROM well tolerated       Pt has been compliant with her HEP and ice.  Symptoms are consistent with:  Medical diagnosis.  Patient is appropriate to continue with skilled physical therapy intervention, as indicated by initial plan of care.    Goal Status:  Pt. will demonstrate/verbalize independence in self-management of condition in : 12 weeks  Pt. will be independent with home exercise program in : 12 weeks  Pt. will have improved quality of sleep: with less  "pain;waking less times/night;in 6 weeks  Pt. will improve posture : and demonstrate posture with minimal to no cuing;in 6 weeks  Patient will reach / maintain arm movement: forward;overhead;behind;for home chores;for dressing;with partial ROM;in 4 weeks    No Data Recorded  Other functional progress:          Plan / Patient Education:     Continue with initial plan of care.  Progress with home program as tolerated.  Per protocol    Subjective:     Pain Ratin- 3    Things are going well per patient.  Less soreness overall.  ROM is improving.      HEP- Frequency: 2-3x/day, Questions or difficulties:  none.      Objective:     Shoulder/Elbow ROM  Date: 18    Shoulder and Elbow ROM ( )   AROM in degrees    Right Left   Shoulder Flexion (0-180 ) 145 160   Shoulder Abduction (0-180 ) 140 160   Shoulder Extension (0-60 )     Shoulder ER (0-90 ) 50 70   Shoulder IR (0-70 )     Shoulder IR/EXT T11 T8   Elbow Flexion (150 ) WNL WNL   Elbow Extension (0 ) WNL WNL         Exercises:  Exercise #1: review and reinstruction for Codman's 20 reps each direction with therapist demonstration, verbal and tactile cuing.  written/picture instructions provided.  Exercise #2: elbow flexion/extension   Comment #2: x 10 each  Exercise #3: wrist flexion/extension, pronation/supination   Comment #3: x 10 each  Exercise #4: PROM shoulder all directions  Exercise #5: wall walk x 10  Exercise #6: pulleys flexion and abduction  Comment #6: wand AAROM flexion, ER, IR x 10 each  Exercise #7: UBE  Comment #7: 3 minutes forward, 3 minutes backward  Exercise #8: flexion/ER in doorway  Comment #8: 30\" x 2 right  Exercise #9: towel IR stretch  Comment #9: 30\" x 2 right  Exercise #10: ER stretch in doorway with towel under arm  Comment #10: 30\" x 2 right  Exercise #11: AROM --flexion, scaption, ER  Comment #11: 15 each     Treatment Today    TREATMENT MINUTES COMMENTS   Evaluation     Self-care/ Home management     Manual therapy   "   Neuromuscular Re-education     Therapeutic Activity     Therapeutic Exercises 30 See exercise flow-sheet for details.    Gait training     Modality__________________                Total 30    Blank areas are intentional and mean the treatment did not include these items.       Rex Mendoza, PT,  12/28/2018

## 2021-06-22 NOTE — PROGRESS NOTES
[facility] Daily Progress     Patient Name: Demetrice Garcia  Date: 2018  Visit #:   Evaluation dates: 18  PTA #2  Referral Diagnosis: Status post arthroscopy of shoulder  Referring provider: Yadi Novak PA-C  Visit Diagnosis:     ICD-10-CM    1. Acute post-operative pain G89.18    2. Shoulder stiffness, right M25.611    3. Decreased strength R53.1    4. S/P rotator cuff repair Z98.890        Precautions / Restrictions : Sling untill follow up at least until next MD follow up in 2 weeks, No active range, AAROM and PROM per order.       Assessment:     Response to Intervention:  Tolerated treatment well.     Symptoms are consistent with:  Medical diagnosis.  Patient is appropriate to continue with skilled physical therapy intervention, as indicated by initial plan of care.    Goal Status:  Pt. will demonstrate/verbalize independence in self-management of condition in : 12 weeks  Pt. will be independent with home exercise program in : 12 weeks  Pt. will have improved quality of sleep: with less pain;waking less times/night;in 6 weeks  Pt. will improve posture : and demonstrate posture with minimal to no cuing;in 6 weeks  Patient will reach / maintain arm movement: forward;overhead;behind;for home chores;for dressing;with partial ROM;in 4 weeks    No Data Recorded  Other functional progress:          Plan / Patient Education:     Continue with initial plan of care.  Progress with home program as tolerated.  Per protocol  Pt to follow up with the surgeon mid December.      Subjective:     Pain Ratin  Pt her shoulder has been more sore since last visit. Pt feels that this may be from the pulleys (scaption) Pt has not done the pulleys the last few days.    Response to last treatment: more sore    HEP- Frequency: 2-3x/day, Questions or difficulties:  none.            Objective:            Manual Therapy  MFR right: deltoid, upper trap, pec major/minor, biceps.    Exercises:  Exercise #1:  review and reinstruction for Jeffy's 20 reps each direction with therapist demonstration, verbal and tactile cuing.  written/picture instructions provided.  Exercise #2: elbow flexion/extension   Comment #2: x 10 each  Exercise #3: wrist flexion/extension, pronation/supination   Comment #3: x 10 each  Exercise #4: PROM shoulder all directions  Exercise #5: wall walk x 10  Exercise #6: pulleys flexion and abduction  Comment #6: wand AAROM flexion, ER, IR x 10 each     Treatment Today    TREATMENT MINUTES COMMENTS   Evaluation     Self-care/ Home management     Manual therapy 15 See above.   Neuromuscular Re-education     Therapeutic Activity     Therapeutic Exercises 10 See exercise flow-sheet for details.        Gait training     Modality__________________                Total 25    Blank areas are intentional and mean the treatment did not include these items.       Teresa Rivas, PTA,  11/29/2018

## 2021-06-22 NOTE — PROGRESS NOTES
[facility] Daily Progress     Patient Name: Demetrice Garcia  Date: 12/10/2018  Visit #: 10/16  Evaluation dates: 12/1/18  PTA #3  Referral Diagnosis: Status post arthroscopy of shoulder  Referring provider: Yadi Novak PA-C  Visit Diagnosis:     ICD-10-CM    1. Acute post-operative pain G89.18    2. Shoulder stiffness, right M25.611    3. S/P rotator cuff repair Z98.890        Precautions / Restrictions : Sling untill follow up at least until next MD follow up in 2 weeks, No active range, AAROM and PROM per order.       Assessment:     Response to Intervention:  Tolerated treatment well.   Maybe over doing it at home.   Pt has been compliant with her HEP and ice.  Symptoms are consistent with:  Medical diagnosis.  Patient is appropriate to continue with skilled physical therapy intervention, as indicated by initial plan of care.    Goal Status:  Pt. will demonstrate/verbalize independence in self-management of condition in : 12 weeks  Pt. will be independent with home exercise program in : 12 weeks  Pt. will have improved quality of sleep: with less pain;waking less times/night;in 6 weeks  Pt. will improve posture : and demonstrate posture with minimal to no cuing;in 6 weeks  Patient will reach / maintain arm movement: forward;overhead;behind;for home chores;for dressing;with partial ROM;in 4 weeks    No Data Recorded  Other functional progress:          Plan / Patient Education:     Continue with initial plan of care.  Progress with home program as tolerated.  Per protocol  Pt to follow up with the surgeon next week 12/12.  Pt instructed to discontinue ironing with her R UE. Pt to focus on her exercises and ice.      Subjective:     Pain Rating: 3    She did not do as many household chores over the weekend and her arm is feeling better.      HEP- Frequency: 2-3x/day, Questions or difficulties:  none.      Objective:        myofascial restriction: right scalenes, upper trap , pec major/minor,  deltoid, biceps.          R shoulder PROM:  Flexion: 160 degrees  AB: 95 degrees  ER: 45 degrees      Exercises:  Exercise #1: review and reinstruction for Codman's 20 reps each direction with therapist demonstration, verbal and tactile cuing.  written/picture instructions provided.  Exercise #2: elbow flexion/extension   Comment #2: x 10 each  Exercise #3: wrist flexion/extension, pronation/supination   Comment #3: x 10 each  Exercise #4: PROM shoulder all directions  Exercise #5: wall walk x 10  Exercise #6: pulleys flexion and abduction  Comment #6: wand AAROM flexion, ER, IR x 10 each     Treatment Today    TREATMENT MINUTES COMMENTS   Evaluation     Self-care/ Home management     Manual therapy 25 PROM to R shoulder all planes  MFR/STM to deltoids, biceps, pec major,    Neuromuscular Re-education     Therapeutic Activity     Therapeutic Exercises     Gait training     Modality__________________                Total 25    Blank areas are intentional and mean the treatment did not include these items.       Rex Mendoza, PT,  12/10/2018

## 2021-06-23 NOTE — TELEPHONE ENCOUNTER
Refill Approved    Rx renewed per Medication Renewal Policy. Medication was last renewed on 11/1/18.    Ov: 11/26/18    Gogo Dailey, [facility] Triage/Med Refill 2/10/2019     Requested Prescriptions   Pending Prescriptions Disp Refills     sertraline (ZOLOFT) 100 MG tablet 90 tablet 0     Sig: Take 1 tablet (100 mg total) by mouth daily.    SSRI Refill Protocol  Passed - 2/9/2019 11:25 PM       Passed - PCP or prescribing provider visit in last year    Last office visit with prescriber/PCP: 11/26/2018 Gogo Nam MD OR same dept: 11/26/2018 Gogo Nam MD OR same specialty: 11/26/2018 Gogo Nam MD  Last physical: Visit date not found Last MTM visit: Visit date not found   Next visit within 3 mo: Visit date not found  Next physical within 3 mo: Visit date not found  Prescriber OR PCP: Gogo Nam MD  Last diagnosis associated with med order: There are no diagnoses linked to this encounter.  If protocol passes may refill for 12 months if within 3 months of last provider visit (or a total of 15 months).

## 2021-06-24 NOTE — TELEPHONE ENCOUNTER
Refill Approved    Rx renewed per Medication Renewal Policy. Medication was last renewed on 5/10/2018 with 1 refill.   Last office visit: 11/26/2018 establishing care with Dr CRISTINO Nam.     Haley Mac, [facility] Triage/Med Refill 3/6/2019     Requested Prescriptions   Pending Prescriptions Disp Refills     DULoxetine (CYMBALTA) 60 MG capsule 90 capsule 1     Sig: TAKE 1 CAPSULE EVERY DAY    Tricyclics/Misc Antidepressant/Antianxiety Meds Refill Protocol Passed - 3/4/2019  8:01 AM       Passed - PCP or prescribing provider visit in last year    Last office visit with prescriber/PCP: 11/26/2018 Gogo Nam MD OR same dept: 11/26/2018 Gogo Nam MD OR same specialty: 11/26/2018 Gogo Nam MD  Last physical: Visit date not found Last MTM visit: Visit date not found   Next visit within 3 mo: Visit date not found  Next physical within 3 mo: Visit date not found  Prescriber OR PCP: Gogo Nam MD  Last diagnosis associated with med order: 1. Depression  - DULoxetine (CYMBALTA) 60 MG capsule; TAKE 1 CAPSULE EVERY DAY  Dispense: 90 capsule; Refill: 1    If protocol passes may refill for 12 months if within 3 months of last provider visit (or a total of 15 months).

## 2021-07-03 NOTE — ADDENDUM NOTE
Addendum Note by Alex Bruno CMA at 3/27/2019  9:40 AM     Author: Alex Bruno CMA Service: -- Author Type: Certified Medical Assistant    Filed: 3/27/2019 11:44 AM Encounter Date: 3/27/2019 Status: Signed    : Alex Bruno CMA (Certified Medical Assistant)    Addended by: ALEX BRUNO on: 3/27/2019 11:44 AM        Modules accepted: Orders         Hypertension is well controlled.  CPM

## 2021-07-03 NOTE — ADDENDUM NOTE
Addendum Note by Alex Bruno CMA at 9/7/2017  3:49 PM     Author: Alex Bruno CMA Service: -- Author Type: Certified Medical Assistant    Filed: 9/7/2017  3:49 PM Encounter Date: 9/7/2017 Status: Signed    : Alex Bruno CMA (Certified Medical Assistant)    Addended by: ALEX BRUNO on: 9/7/2017 03:49 PM        Modules accepted: Orders

## 2021-07-03 NOTE — ADDENDUM NOTE
Addendum Note by Lauryn Mustafa RT (R) at 3/27/2019  9:40 AM     Author: Lauryn Mustafa RT (R) Service: -- Author Type: Radiologic Technologist    Filed: 3/27/2019 11:32 AM Encounter Date: 3/27/2019 Status: Signed    : Lauryn Mustafa RT (R) (Radiologic Technologist)    Addended by: LAURYN MUSTAFA on: 3/27/2019 11:32 AM        Modules accepted: Orders

## 2021-07-16 ENCOUNTER — TELEPHONE (OUTPATIENT)
Dept: FAMILY MEDICINE | Facility: CLINIC | Age: 76
End: 2021-07-16

## 2021-07-16 DIAGNOSIS — F11.90 CHRONIC, CONTINUOUS USE OF OPIOIDS: ICD-10-CM

## 2021-07-16 DIAGNOSIS — N94.819 VULVODYNIA: ICD-10-CM

## 2021-07-16 NOTE — TELEPHONE ENCOUNTER
7-16-21  Reason for Call:  Medication or medication refill:    Do you use a [facility]     Name of the medication requested: traMADoL (ULTRAM) 50 mg tablet    Other request: none    Can we leave a detailed message on this number? YES    Phone number patient can be reached at: Home number on file [phone] (home)    Best Time: anytime    Call taken on 7/16/2021 at 8:11 AM by Letha Landis

## 2021-07-19 RX ORDER — TRAMADOL HYDROCHLORIDE 50 MG/1
TABLET ORAL
Qty: 60 TABLET | Refills: 0 | Status: SHIPPED | OUTPATIENT
Start: 2021-07-19 | End: 2021-08-25

## 2021-07-19 NOTE — TELEPHONE ENCOUNTER
PMDP attempted to be reviewed but cannot as she typically fills in [place], [place] and doesn't pull up that data; please call pharmacy to confirm last fill dates. Rx sent as no prior concerns.

## 2021-07-22 ENCOUNTER — RECORDS - HEALTHEAST (OUTPATIENT)
Dept: FAMILY MEDICINE | Facility: CLINIC | Age: 76
End: 2021-07-22

## 2021-07-22 DIAGNOSIS — Z12.31 OTHER SCREENING MAMMOGRAM: ICD-10-CM

## 2021-08-01 ENCOUNTER — HEALTH MAINTENANCE LETTER (OUTPATIENT)
Age: 76
End: 2021-08-01

## 2021-08-05 ENCOUNTER — TRANSFERRED RECORDS (OUTPATIENT)
Dept: HEALTH INFORMATION MANAGEMENT | Facility: CLINIC | Age: 76
End: 2021-08-05

## 2021-08-19 ENCOUNTER — TRANSFERRED RECORDS (OUTPATIENT)
Dept: HEALTH INFORMATION MANAGEMENT | Facility: CLINIC | Age: 76
End: 2021-08-19

## 2021-08-25 DIAGNOSIS — F11.90 CHRONIC, CONTINUOUS USE OF OPIOIDS: ICD-10-CM

## 2021-08-25 DIAGNOSIS — N94.819 VULVODYNIA: ICD-10-CM

## 2021-08-25 RX ORDER — TRAMADOL HYDROCHLORIDE 50 MG/1
TABLET ORAL
Qty: 60 TABLET | Refills: 0 | Status: SHIPPED | OUTPATIENT
Start: 2021-08-25 | End: 2021-09-28

## 2021-08-25 NOTE — TELEPHONE ENCOUNTER
Refill request for medication: traMADol (ULTRAM) 50 MG tablet  Last visit addressing this medication: 4/14/21  Follow up plan 6  months  Last refill on 7/19/21, quantity #60   CSA completed 4/14/21  Date PDMP was last reviewed 7/19/21    Appointment: Not due   Appointment recommended at least every 3 months for opioid prescriptions. Appointment recommended at least every 6 months for ADHD medications.    Lisset YUSUF CMA ([facility])

## 2021-09-26 ENCOUNTER — HEALTH MAINTENANCE LETTER (OUTPATIENT)
Age: 76
End: 2021-09-26

## 2021-09-28 DIAGNOSIS — F11.90 CHRONIC, CONTINUOUS USE OF OPIOIDS: ICD-10-CM

## 2021-09-28 DIAGNOSIS — N94.819 VULVODYNIA: ICD-10-CM

## 2021-09-28 RX ORDER — TRAMADOL HYDROCHLORIDE 50 MG/1
TABLET ORAL
Qty: 60 TABLET | Refills: 0 | Status: SHIPPED | OUTPATIENT
Start: 2021-09-28 | End: 2021-11-08

## 2021-09-28 NOTE — TELEPHONE ENCOUNTER
Refill request for medication: traMADol (ULTRAM) 50 MG tablet  Last visit addressing this medication: 4/14/21  Follow up plan 6  months  Last refill on 8/25/21, quantity #60   CSA completed 4/14/21  Date PDMP was last reviewed 7/19/21    Appointment: Appointment scheduled for 10/14/21   Appointment recommended at least every 3 months for opioid prescriptions. Appointment recommended at least every 6 months for ADHD medications.    Lisset YUSUF CMA ([facility])

## 2021-09-28 NOTE — TELEPHONE ENCOUNTER
Reason for Call:  Medication or medication refill:    Do you use a [facility]? NO Name of the pharmacy and phone number for the current request:  [facility] [address] @ [facility] in [place], [phone]    Name of the medication requested:   traMADol (ULTRAM) 50 MG tablet 60 tablet 0 8/25/2021  No   Sig: [TRAMADOL (ULTRAM) 50 MG TABLET] TAKE 2 TABLETS BY MOUTH EVERY DAY. MAY TAKE UP TO 6 TABLET ON TRAVEL DAYS 2 TABLET EVERY 8 HOURS.     Can we leave a detailed message on this number? YES    Phone number patient can be reached at: Cell number on file:    Telephone Information:   Mobile [phone]   Mobile [phone]       Best Time: any    Call taken on 9/28/2021 at 9:00 AM by Petra Mariscal

## 2021-10-14 ENCOUNTER — OFFICE VISIT (OUTPATIENT)
Dept: FAMILY MEDICINE | Facility: CLINIC | Age: 76
End: 2021-10-14
Payer: COMMERCIAL

## 2021-10-14 VITALS
WEIGHT: 138 LBS | SYSTOLIC BLOOD PRESSURE: 128 MMHG | HEIGHT: 64 IN | DIASTOLIC BLOOD PRESSURE: 70 MMHG | BODY MASS INDEX: 23.56 KG/M2

## 2021-10-14 DIAGNOSIS — J45.20 MILD INTERMITTENT ASTHMA WITHOUT COMPLICATION: ICD-10-CM

## 2021-10-14 DIAGNOSIS — F11.90 CHRONIC, CONTINUOUS USE OF OPIOIDS: ICD-10-CM

## 2021-10-14 DIAGNOSIS — N94.819 VULVODYNIA: ICD-10-CM

## 2021-10-14 DIAGNOSIS — F33.41 RECURRENT MAJOR DEPRESSIVE DISORDER, IN PARTIAL REMISSION (H): ICD-10-CM

## 2021-10-14 DIAGNOSIS — F43.21 GRIEF: ICD-10-CM

## 2021-10-14 DIAGNOSIS — L94.0 CIRCUMSCRIBED SCLERODERMA: ICD-10-CM

## 2021-10-14 DIAGNOSIS — R07.89 CHEST HEAVINESS: Primary | ICD-10-CM

## 2021-10-14 LAB
ANION GAP SERPL CALCULATED.3IONS-SCNC: 7 MMOL/L (ref 5–18)
ATRIAL RATE - MUSE: 76 BPM
BUN SERPL-MCNC: 11 MG/DL (ref 8–28)
CALCIUM SERPL-MCNC: 9.3 MG/DL (ref 8.5–10.5)
CHLORIDE BLD-SCNC: 106 MMOL/L (ref 98–107)
CO2 SERPL-SCNC: 28 MMOL/L (ref 22–31)
CREAT SERPL-MCNC: 0.77 MG/DL (ref 0.6–1.1)
DIASTOLIC BLOOD PRESSURE - MUSE: NORMAL MMHG
ERYTHROCYTE [DISTWIDTH] IN BLOOD BY AUTOMATED COUNT: 13.3 % (ref 10–15)
GFR SERPL CREATININE-BSD FRML MDRD: 75 ML/MIN/1.73M2
GLUCOSE BLD-MCNC: 93 MG/DL (ref 70–125)
HCT VFR BLD AUTO: 42.8 % (ref 35–47)
HGB BLD-MCNC: 14 G/DL (ref 11.7–15.7)
INTERPRETATION ECG - MUSE: NORMAL
MCH RBC QN AUTO: 32.1 PG (ref 26.5–33)
MCHC RBC AUTO-ENTMCNC: 32.7 G/DL (ref 31.5–36.5)
MCV RBC AUTO: 98 FL (ref 78–100)
P AXIS - MUSE: 70 DEGREES
PLATELET # BLD AUTO: 246 10E3/UL (ref 150–450)
POTASSIUM BLD-SCNC: 4.5 MMOL/L (ref 3.5–5)
PR INTERVAL - MUSE: 126 MS
QRS DURATION - MUSE: 84 MS
QT - MUSE: 374 MS
QTC - MUSE: 420 MS
R AXIS - MUSE: 44 DEGREES
RBC # BLD AUTO: 4.36 10E6/UL (ref 3.8–5.2)
SODIUM SERPL-SCNC: 141 MMOL/L (ref 136–145)
SYSTOLIC BLOOD PRESSURE - MUSE: NORMAL MMHG
T AXIS - MUSE: 47 DEGREES
TROPONIN I SERPL-MCNC: <0.01 NG/ML (ref 0–0.29)
VENTRICULAR RATE- MUSE: 76 BPM
WBC # BLD AUTO: 4.8 10E3/UL (ref 4–11)

## 2021-10-14 PROCEDURE — 80048 BASIC METABOLIC PNL TOTAL CA: CPT | Performed by: FAMILY MEDICINE

## 2021-10-14 PROCEDURE — 93005 ELECTROCARDIOGRAM TRACING: CPT | Performed by: FAMILY MEDICINE

## 2021-10-14 PROCEDURE — 99214 OFFICE O/P EST MOD 30 MIN: CPT | Mod: 25 | Performed by: FAMILY MEDICINE

## 2021-10-14 PROCEDURE — 93010 ELECTROCARDIOGRAM REPORT: CPT | Performed by: INTERNAL MEDICINE

## 2021-10-14 PROCEDURE — 90471 IMMUNIZATION ADMIN: CPT | Performed by: FAMILY MEDICINE

## 2021-10-14 PROCEDURE — 84484 ASSAY OF TROPONIN QUANT: CPT | Performed by: FAMILY MEDICINE

## 2021-10-14 PROCEDURE — 85027 COMPLETE CBC AUTOMATED: CPT | Performed by: FAMILY MEDICINE

## 2021-10-14 PROCEDURE — 90662 IIV NO PRSV INCREASED AG IM: CPT | Performed by: FAMILY MEDICINE

## 2021-10-14 PROCEDURE — 36415 COLL VENOUS BLD VENIPUNCTURE: CPT | Performed by: FAMILY MEDICINE

## 2021-10-14 PROCEDURE — 96127 BRIEF EMOTIONAL/BEHAV ASSMT: CPT | Performed by: FAMILY MEDICINE

## 2021-10-14 RX ORDER — PHENOL 1.4 %
10 AEROSOL, SPRAY (ML) MUCOUS MEMBRANE
COMMUNITY

## 2021-10-14 ASSESSMENT — ASTHMA QUESTIONNAIRES
ACT_TOTALSCORE: 20
QUESTION_3 LAST FOUR WEEKS HOW OFTEN DID YOUR ASTHMA SYMPTOMS (WHEEZING, COUGHING, SHORTNESS OF BREATH, CHEST TIGHTNESS OR PAIN) WAKE YOU UP AT NIGHT OR EARLIER THAN USUAL IN THE MORNING: NOT AT ALL
QUESTION_5 LAST FOUR WEEKS HOW WOULD YOU RATE YOUR ASTHMA CONTROL: SOMEWHAT CONTROLLED
QUESTION_4 LAST FOUR WEEKS HOW OFTEN HAVE YOU USED YOUR RESCUE INHALER OR NEBULIZER MEDICATION (SUCH AS ALBUTEROL): NOT AT ALL

## 2021-10-14 ASSESSMENT — MIFFLIN-ST. JEOR: SCORE: 1093.02

## 2021-10-14 ASSESSMENT — PATIENT HEALTH QUESTIONNAIRE - PHQ9: SUM OF ALL RESPONSES TO PHQ QUESTIONS 1-9: 3

## 2021-10-14 NOTE — PROGRESS NOTES
Assessment/plan   Demetrice Garcia is a 76 year old female who is established to my practice.    Demetrice was seen today for follow up.    Diagnoses and all orders for this visit:    Chest heaviness  Chest heaviness at rest and but less so with activity for the past several months. Atypical in nature; no associated chest pain, jaw pain, arm pain etc. Historically has had reassuring EKG and NM stress testing in 2011. Will advise labs, EKG and exercise stress test with imaging for further evaluation. Encouraged cardiology follow up if persisting to consider angiogram testing. She additional was encouraged to increase use of her albuterol to see if this relieves some of the chest heaviness with known hx of asthma. Denies concern for depression/mood as a contributing factor to the heaviness.   -     Echocardiogram Exercise Stress; Future  -     EKG 12-lead, tracing only- NSR, VR 76, no acute ST or T wave changes  -     Basic metabolic panel  (Ca, Cl, CO2, Creat, Gluc, K, Na, BUN); - normal   -     CBC with platelets; - normal   -     Troponin I; - negative    Mild intermittent asthma without complication  ACT well controlled; AAP updated. Encouraged PRN albuterol pre exercise/yardwork    Recurrent major depressive disorder, in partial remission (H)  Grief and anxiety  Stable, doing well on her Cymbalta and sertraline. Declines weaning off one of the SSRI/SNRI combo at this time.   - agrees to mental health referral for counseling as she still struggles with grief from loss of her son  -     MENTAL HEALTH REFERRAL  - Adult; Outpatient Treatment; Individual/Couples/Family/Group Therapy/Health Psychology; [facility] [phone]; We will contact you to schedule the appointment or please call with any questions; Future    Chronic, continuous use of opioids  Vulvodynia  Lichen Sclerosus Et Atrophicus  See my note from 11/26/18 reviewing her notable hx/work up/alternative pain therapies attempted in the past.  "  Reviewed how patient has tolerated tramadol 100 mg daily.  She takes this as two 50 mg tablets each morning (and once per month or less: needs to repeat this dose for max of 6 tabs that day if traveling/prolonged sitting).    - Rx for tramadol 50mg tabs (2 per day); #60 per month. Last filled 2021 for #60 tabs  - CSA signed 2021  - UDS normal 2020 with presence of tramadol  - PMDP reviewed 10/14/2021- doesn't file from her [place] pharmacy location so this was pulled up directly on [facility] aware website and confirms no concerns with regular fills at her [place] pharmacy ([facility])     Follow up: Return in about 3 months (around 2022) for Recheck.      Gogo Nam MD    Subjective:      HPI: Demetrice Garcia is a 76 year old female who is here for:    Chief Complaint   Patient presents with     Follow Up     Medication check - Having some pressure on chest area       Med check: Doing well overall from the tramadol standpoint; on it chronically for vulvodynia from lichen sclerosis. Continues on 50mg 2 tabs daily with additional as needed for travel/prolonged sitting.No recent concerns or changes to her plans for vacations/trips.        Review of Systems:    Chest pain: Describes a heaviness in her chest for several months; over the summer she noticed this.  Lays down in the afternoon \"running out of steam\"- \"might be age\"  Some days gets about 20K steps a day sometimes with all her gardening/yard work   Feels more winded when she walks, but also not using her albuterol as much \"as I should\"  She does have hx of depression anxiety. Feels worse- 2 years since her  . Counseling ended with COVID started  Has hx of asthma; never on a daily inhaler    12 point comprehensive review of systems was negative except as noted and HPI     Social History:  Social History     Social History Narrative    Lives with her . Her son had an unexpected death 10/8/2019. Was at [place] for vulvodynia and on " "chronic pain med- tramadol.   2 grown sons  2 grand kids in their upper 20s.  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   Gogo Nam MD         Medical History:  Patient Active Problem List   Diagnosis     Lichen Sclerosus Et Atrophicus     Asthma     Vulvodynia     Insomnia     Recurrent major depressive disorder, in full remission (H)     Chronic, continuous use of tramadol for vulvodynia/lichen sclerosis     Fear of flying     Controlled substance agreement signed 11/25/19- Tramadol 50mg tabs- takes 100mg daily w/ PRN during travel 50mg q6 hr as needed, MELECIO Duval, f/u q6 months     Past Medical History:   Diagnosis Date     Collagenous colitis 9/4/2018     Solitary pulmonary nodule      CT from 11/2015: 1.3 x 0.9 cm left lower lobe pulmonary nodule, stable to minimally decreased when compared to 6/24/2014. Recommend one additional follow-up to document 2 years of stability. Pt declines repeat CT as of 11/2018.     Past Surgical History:   Procedure Laterality Date     ARTHROSCOPY SHOULDER ROTATOR CUFF REPAIR       BIOPSY BREAST Right 2012 ?     Shellfish containing products [shellfish-derived products] and Naproxen  Family History   Problem Relation Age of Onset     Pancreatic Cancer Father 86.00       Medications:  Current Outpatient Medications   Medication     albuterol (PROAIR HFA;PROVENTIL HFA;VENTOLIN HFA) 90 mcg/actuation inhaler     calcium carbonate (OS-TRINY) 600 mg calcium (1,500 mg) tablet     DULoxetine (CYMBALTA) 60 MG capsule     Melatonin 10 MG TABS tablet     sertraline (ZOLOFT) 100 MG tablet     traMADol (ULTRAM) 50 MG tablet     No current facility-administered medications for this visit.         Imaging & Labs reviewed this visit:     No results found for this or any previous visit (from the past 24 hour(s)).        Objective:      Vitals:    10/14/21 0926   BP: 128/70   Weight: 62.6 kg (138 lb)   Height: 1.613 m (5' 3.5\")       Physical Exam:     General: Alert, no acute distress. "   HEENT: normocephalic conjunctivae are clear  Neck: supple without adenopathy or thyromegaly.  Lungs: Good aeration bilaterally. Clear to auscultation without wheezes, rales or rhonci.   Heart: regular rate and rhythm, normal S1 and S2, no murmurs  Abdomen: soft and nontender  Skin: clear without rash or lesions  Neuro: alert, interactive moving all extremities equally, normal muscle tone in all 4 extremities  Psych: intermittently tearful when reviewing her grief concerns; otherwise insightful and calm, appropriate mood & congruent affect; no SI/HI  Gogo Nam MD

## 2021-10-15 ASSESSMENT — ASTHMA QUESTIONNAIRES: ACT_TOTALSCORE: 20

## 2021-11-08 DIAGNOSIS — F11.90 CHRONIC, CONTINUOUS USE OF OPIOIDS: ICD-10-CM

## 2021-11-08 DIAGNOSIS — N94.819 VULVODYNIA: ICD-10-CM

## 2021-11-08 RX ORDER — TRAMADOL HYDROCHLORIDE 50 MG/1
TABLET ORAL
Qty: 60 TABLET | Refills: 0 | Status: SHIPPED | OUTPATIENT
Start: 2021-11-08 | End: 2021-12-10

## 2021-11-08 NOTE — TELEPHONE ENCOUNTER
11-8-21  Reason for Call:   medication refill:    Do you use a [facility] [place]    Name of the medication requested: traMADol (ULTRAM) 50 MG tablet    Other request: none    Can we leave a detailed message on this number? YES    Phone number patient can be reached at: Home number on file [phone] (home)    Best Time: anytime    Call taken on 11/8/2021 at 8:03 AM by Letha Landis

## 2021-11-15 DIAGNOSIS — F32.A DEPRESSION: ICD-10-CM

## 2021-11-16 RX ORDER — DULOXETIN HYDROCHLORIDE 60 MG/1
CAPSULE, DELAYED RELEASE ORAL
Qty: 90 CAPSULE | Refills: 1 | Status: SHIPPED | OUTPATIENT
Start: 2021-11-16 | End: 2022-05-27

## 2021-11-16 NOTE — TELEPHONE ENCOUNTER
Warnings Override History for DULoxetine (CYMBALTA) 60 MG capsule [[id number]]    Overridden by Gogo Nam MD on Nov 8, 2021 6:33 PM   Drug-Drug   1. TRAMADOL / DULOXETINE [Level: Major] [Reason: Tolerated medication/side effects in past]   Other Orders: traMADol (ULTRAM) 50 MG tablet         Overridden by Gogo Nam MD on Sep 28, 2021 10:25 AM   Drug-Drug   1. TRAMADOL / DULOXETINE [Level: Major] [Reason: Tolerated medication/side effects in past]   Other Orders: traMADol (ULTRAM) 50 MG tablet         Overridden by Gogo Nam MD on Aug 25, 2021 1:35 PM   Drug-Drug   1. TRAMADOL / DULOXETINE [Level: Major] [Reason: Tolerated medication/side effects in past]   Other Orders: traMADol (ULTRAM) 50 MG tablet         Overridden by Gogo Nam MD on Jul 19, 2021 11:28 AM   Drug-Drug   1. TRAMADOL / DULOXETINE [Level: Major] [Reason: Tolerated medication/side effects in past]   Other Orders: traMADol (ULTRAM) 50 MG tablet        Last Written Prescription Date:  4/14/21  Last Fill Quantity: 90,  # refills: 1   Last office visit provider:  10/14/21     Requested Prescriptions   Pending Prescriptions Disp Refills     DULoxetine (CYMBALTA) 60 MG capsule [Pharmacy Med Name: DULOXETINE DR 60MG CAPSULES] 90 capsule 1     Sig: TAKE 1 CAPSULE(60 MG) BY MOUTH DAILY       Serotonin-Norepinephrine Reuptake Inhibitors  Passed - 11/15/2021  4:03 AM        Passed - Blood pressure under 140/90 in past 12 months     BP Readings from Last 3 Encounters:   10/14/21 128/70   04/14/21 100/72   11/20/20 132/68                 Passed - PHQ-9 score of less than 5 in past 6 months     Please review last PHQ-9 score.           Passed - Medication is active on med list        Passed - Patient is age 18 or older        Passed - No active pregnancy on record        Passed - No positive pregnancy test in past 12 months        Passed - Recent (6 mo) or future (30 days) visit within the  "authorizing provider's specialty     Patient had office visit in the last 6 months or has a visit in the next 30 days with authorizing provider or within the authorizing provider's specialty.  See \"Patient Info\" tab in inbasket, or \"Choose Columns\" in Meds & Orders section of the refill encounter.                 Nay Jones RN 11/16/21 1:17 PM  "

## 2021-12-10 ENCOUNTER — NURSE TRIAGE (OUTPATIENT)
Dept: NURSING | Facility: CLINIC | Age: 76
End: 2021-12-10
Payer: COMMERCIAL

## 2021-12-10 DIAGNOSIS — N94.819 VULVODYNIA: ICD-10-CM

## 2021-12-10 DIAGNOSIS — F11.90 CHRONIC, CONTINUOUS USE OF OPIOIDS: ICD-10-CM

## 2021-12-10 RX ORDER — TRAMADOL HYDROCHLORIDE 50 MG/1
100 TABLET ORAL EVERY 6 HOURS PRN
Qty: 60 TABLET | Refills: 0 | Status: SHIPPED | OUTPATIENT
Start: 2021-12-10 | End: 2022-01-11

## 2021-12-10 NOTE — TELEPHONE ENCOUNTER
Needs a tramadol refill.  She will be out of the medication by Monday. Pharmacy [facility] in [place].  Thank you,  Izzy Lugo RN  [facility]    Reason for Disposition    Request for URGENT new prescription or refill of 'essential' medication (i.e., likelihood of harm to patient if not taken) and triager unable to fill per department policy    Additional Information    Negative: Drug overdose and triager unable to answer question    Negative: Caller requesting information unrelated to medicine    Negative: Caller requesting a prescription for Strep throat and has a positive culture result    Negative: Rash while taking a medication or within 3 days of stopping it    Negative: Immunization reaction suspected    Negative: Asthma and having symptoms of asthma (cough, wheezing, etc.)    Negative: Breastfeeding questions about mother's medicines and diet    Negative: MORE THAN A DOUBLE DOSE of a prescription or over-the-counter (OTC) drug    Negative: DOUBLE DOSE (an extra dose or lesser amount) of over-the-counter (OTC) drug and any symptoms (e.g., dizziness, nausea, pain, sleepiness)    Negative: DOUBLE DOSE (an extra dose or lesser amount) of prescription drug and any symptoms (e.g., dizziness, nausea, pain, sleepiness)    Negative: Took another person's prescription drug    Negative: DOUBLE DOSE (an extra dose or lesser amount) of prescription drug and NO symptoms (Exception: a double dose of antibiotics)    Negative: Diabetes drug error or overdose (e.g., took wrong type of insulin or took extra dose)    Negative: Caller has medication question about med not prescribed by PCP and triager unable to answer question (e.g., compatibility with other med, storage)    Protocols used: MEDICATION QUESTION CALL-A-OH

## 2021-12-10 NOTE — TELEPHONE ENCOUNTER
Routing refill request to provider for review/approval because:  Controlled substance request    Last Written Prescription Date:  11/8/21  Last Fill Quantity: 60,  # refills: 0   Last office visit provider:  10/14/21     Requested Prescriptions   Pending Prescriptions Disp Refills     traMADol (ULTRAM) 50 MG tablet 60 tablet 0     Sig: [TRAMADOL (ULTRAM) 50 MG TABLET] TAKE 2 TABLETS BY MOUTH EVERY DAY. MAY TAKE UP TO 6 TABLET ON TRAVEL DAYS 2 TABLET EVERY 8 HOURS.       There is no refill protocol information for this order          Shyla Tate RN 12/10/21 8:15 AM

## 2022-01-11 DIAGNOSIS — F11.90 CHRONIC, CONTINUOUS USE OF OPIOIDS: ICD-10-CM

## 2022-01-11 DIAGNOSIS — N94.819 VULVODYNIA: ICD-10-CM

## 2022-01-11 RX ORDER — TRAMADOL HYDROCHLORIDE 50 MG/1
100 TABLET ORAL EVERY 6 HOURS PRN
Qty: 60 TABLET | Refills: 0 | Status: SHIPPED | OUTPATIENT
Start: 2022-01-11 | End: 2022-02-15

## 2022-01-11 NOTE — TELEPHONE ENCOUNTER
Refill request for medication: traMADol (ULTRAM) 50 MG tablet  Last visit addressing this medication: 10/14/2021  Follow up plan 3  months  Last refill on 12/10/2021, quantity #60   CSA completed 4/14/2021  Date PDMP was last reviewed 10/14/2021    Appointment: Patient will call back to schedule appointment - She is currently in [place]  Appointment recommended at least every 3 months for opioid prescriptions. Appointment recommended at least every 6 months for ADHD medications.    Alissa Smith

## 2022-02-12 ENCOUNTER — TRANSFERRED RECORDS (OUTPATIENT)
Dept: HEALTH INFORMATION MANAGEMENT | Facility: CLINIC | Age: 77
End: 2022-02-12
Payer: COMMERCIAL

## 2022-02-15 ENCOUNTER — NURSE TRIAGE (OUTPATIENT)
Dept: NURSING | Facility: CLINIC | Age: 77
End: 2022-02-15
Payer: COMMERCIAL

## 2022-02-15 DIAGNOSIS — N94.819 VULVODYNIA: ICD-10-CM

## 2022-02-15 DIAGNOSIS — F11.90 CHRONIC, CONTINUOUS USE OF OPIOIDS: ICD-10-CM

## 2022-02-15 RX ORDER — TRAMADOL HYDROCHLORIDE 50 MG/1
100 TABLET ORAL EVERY 6 HOURS PRN
Qty: 60 TABLET | Refills: 0 | Status: SHIPPED | OUTPATIENT
Start: 2022-02-15 | End: 2022-03-08

## 2022-02-15 NOTE — TELEPHONE ENCOUNTER
In [place].    Asking for Tramadol refilll.  Leaving in a couple days to travel back to [place].  Would like it filled before traveling.    Monika Hernandez RN  [facility]

## 2022-02-15 NOTE — TELEPHONE ENCOUNTER
Left message for patient to clarify which pharmacy she would like this sent to. Please update pharmacy.     Patient last seen 10/14/2021 by PCP, CSA signed 4/2021.  Benita Conrad LPN

## 2022-03-08 ENCOUNTER — TELEPHONE (OUTPATIENT)
Dept: FAMILY MEDICINE | Facility: CLINIC | Age: 77
End: 2022-03-08
Payer: COMMERCIAL

## 2022-03-08 DIAGNOSIS — N94.819 VULVODYNIA: ICD-10-CM

## 2022-03-08 DIAGNOSIS — F11.90 CHRONIC, CONTINUOUS USE OF OPIOIDS: ICD-10-CM

## 2022-03-08 DIAGNOSIS — F33.41 RECURRENT MAJOR DEPRESSIVE DISORDER, IN PARTIAL REMISSION (H): ICD-10-CM

## 2022-03-08 NOTE — TELEPHONE ENCOUNTER
Incoming call from patient stating she needed in just for a check up on meds - not urgent. I told patient she is due for her AWV on 04/15/22 - we scheduled for AWV/MedCheck on 04/20/22 at 9am during a hospital f/u block. Patient aware that if Dr Nam prefers, we will call and reschedule her. Provider has no openings during the month of April.

## 2022-03-08 NOTE — TELEPHONE ENCOUNTER
Refill Request  Medication name: Sertraline 100mg once a day  Who prescribed the medication: Gogo Nam  Pharmacy Name and Location: [facility]  Is patient out of medication: 2 days left  Patient notified refill processed in 72 hours: yes  Okay to leave a detailed message: yes    Refill Request  Medication name: Tramadol 50mg every 6 hours PRN  Who prescribed the medication: Gogo Nam  Pharmacy Name and Location[facility] [address]   Is patient out of medication: 5 days   Patient notified refill processed in 72 hours: yes  Okay to leave a detailed message: yes    Patient is due to be seen in office. Transferred to scheduling for appointment.     Mable Anderson RN   03/08/22 8:06 AM  [facility]

## 2022-03-09 RX ORDER — TRAMADOL HYDROCHLORIDE 50 MG/1
100 TABLET ORAL EVERY 6 HOURS PRN
Qty: 60 TABLET | Refills: 0 | Status: SHIPPED | OUTPATIENT
Start: 2022-03-09 | End: 2022-04-20

## 2022-03-09 RX ORDER — SERTRALINE HYDROCHLORIDE 100 MG/1
100 TABLET, FILM COATED ORAL DAILY
Qty: 90 TABLET | Refills: 0 | Status: SHIPPED | OUTPATIENT
Start: 2022-03-09 | End: 2022-04-20

## 2022-03-09 NOTE — TELEPHONE ENCOUNTER
"Routing refRouting refill request to provider for review/approval because:  Controlled Substance Request-Tramadol  Drug interaction warning-Sertraline.     Last Written Prescription Date:  2/18/21  Last Fill Quantity: 90,  # refills: 3  Last office visit provider:  10/14/21    Last Written Prescription Date:  2/15/22  Last Fill Quantity: 60,  # refills: 0      Requested Prescriptions   Pending Prescriptions Disp Refills     sertraline (ZOLOFT) 100 MG tablet 90 tablet 3     Sig: Take 1 tablet (100 mg) by mouth daily       SSRIs Protocol Failed - 3/8/2022  8:07 AM        Failed - Recent (6 mo) or future (30 days) visit within the authorizing provider's specialty     Patient had office visit in the last 6 months or has a visit in the next 30 days with authorizing provider or within the authorizing provider's specialty.  See \"Patient Info\" tab in inbasket, or \"Choose Columns\" in Meds & Orders section of the refill encounter.            Passed - PHQ-9 score less than 5 in past 6 months     Please review last PHQ-9 score.           Passed - Medication is active on med list        Passed - Patient is age 18 or older        Passed - No active pregnancy on record        Passed - No positive pregnancy test in last 12 months           traMADol (ULTRAM) 50 MG tablet 60 tablet 0     Sig: Take 2 tablets (100 mg) by mouth every 6 hours as needed for severe pain [TRAMADOL (ULTRAM) 50 MG TABLET] TAKE 2 TABLETS BY MOUTH EVERY DAY. MAY TAKE UP TO 6 TABLET ON TRAVEL DAYS 2 TABLET EVERY 8 HOURS       There is no refill protocol information for this order          Donna Sexton RN 03/09/22 9:46 AM  "

## 2022-03-09 NOTE — TELEPHONE ENCOUNTER
Refill request for medication: Tramadol & Sertraline  Last visit addressing this medication: 10/14/2021  Follow up plan 3  months  Last refill on 2/15/2022 & 2/18/2021 , quantity #60 & #90  CSA completed 4/2021  Date PDMP was last reviewed 10/14/2021    Appointment: Appointment scheduled for 4/20/2021   Appointment recommended at least every 3 months for opioid prescriptions. Appointment recommended at least every 6 months for ADHD medications.    Benita Conrad LPN

## 2022-04-20 ENCOUNTER — TELEPHONE (OUTPATIENT)
Dept: FAMILY MEDICINE | Facility: CLINIC | Age: 77
End: 2022-04-20

## 2022-04-20 ENCOUNTER — OFFICE VISIT (OUTPATIENT)
Dept: FAMILY MEDICINE | Facility: CLINIC | Age: 77
End: 2022-04-20
Payer: COMMERCIAL

## 2022-04-20 ENCOUNTER — TRANSFERRED RECORDS (OUTPATIENT)
Dept: HEALTH INFORMATION MANAGEMENT | Facility: CLINIC | Age: 77
End: 2022-04-20

## 2022-04-20 VITALS
HEIGHT: 63 IN | HEART RATE: 68 BPM | OXYGEN SATURATION: 97 % | WEIGHT: 136 LBS | SYSTOLIC BLOOD PRESSURE: 128 MMHG | DIASTOLIC BLOOD PRESSURE: 70 MMHG | BODY MASS INDEX: 24.1 KG/M2

## 2022-04-20 DIAGNOSIS — N94.819 VULVODYNIA: ICD-10-CM

## 2022-04-20 DIAGNOSIS — F11.90 CHRONIC, CONTINUOUS USE OF OPIOIDS: ICD-10-CM

## 2022-04-20 DIAGNOSIS — M19.041 OSTEOARTHRITIS OF BOTH HANDS, UNSPECIFIED OSTEOARTHRITIS TYPE: ICD-10-CM

## 2022-04-20 DIAGNOSIS — J45.20 MILD INTERMITTENT ASTHMA WITHOUT COMPLICATION: ICD-10-CM

## 2022-04-20 DIAGNOSIS — Z00.00 ENCOUNTER FOR MEDICARE ANNUAL WELLNESS EXAM: Primary | ICD-10-CM

## 2022-04-20 DIAGNOSIS — F33.41 RECURRENT MAJOR DEPRESSIVE DISORDER, IN PARTIAL REMISSION (H): ICD-10-CM

## 2022-04-20 DIAGNOSIS — L94.0 CIRCUMSCRIBED SCLERODERMA: ICD-10-CM

## 2022-04-20 DIAGNOSIS — M19.042 OSTEOARTHRITIS OF BOTH HANDS, UNSPECIFIED OSTEOARTHRITIS TYPE: ICD-10-CM

## 2022-04-20 LAB
AMPHETAMINES UR QL SCN: NORMAL
BARBITURATES UR QL: NORMAL
BENZODIAZ UR QL: NORMAL
CANNABINOIDS UR QL SCN: NORMAL
CHOLEST SERPL-MCNC: 213 MG/DL
COCAINE UR QL: NORMAL
CREAT UR-MCNC: 102 MG/DL
FASTING STATUS PATIENT QL REPORTED: YES
FASTING STATUS PATIENT QL REPORTED: YES
GLUCOSE BLD-MCNC: 97 MG/DL (ref 70–125)
HDLC SERPL-MCNC: 66 MG/DL
HGB BLD-MCNC: 14.5 G/DL (ref 11.7–15.7)
LDLC SERPL CALC-MCNC: 134 MG/DL
OPIATES UR QL SCN: NORMAL
OXYCODONE UR QL: NORMAL
PCP UR QL SCN: NORMAL
TRIGL SERPL-MCNC: 67 MG/DL

## 2022-04-20 PROCEDURE — 36415 COLL VENOUS BLD VENIPUNCTURE: CPT | Performed by: FAMILY MEDICINE

## 2022-04-20 PROCEDURE — 80061 LIPID PANEL: CPT | Performed by: FAMILY MEDICINE

## 2022-04-20 PROCEDURE — 80307 DRUG TEST PRSMV CHEM ANLYZR: CPT | Performed by: FAMILY MEDICINE

## 2022-04-20 PROCEDURE — 99397 PER PM REEVAL EST PAT 65+ YR: CPT | Performed by: FAMILY MEDICINE

## 2022-04-20 PROCEDURE — 80373 DRUG SCREENING TRAMADOL: CPT | Performed by: FAMILY MEDICINE

## 2022-04-20 PROCEDURE — 82947 ASSAY GLUCOSE BLOOD QUANT: CPT | Performed by: FAMILY MEDICINE

## 2022-04-20 PROCEDURE — 85018 HEMOGLOBIN: CPT | Performed by: FAMILY MEDICINE

## 2022-04-20 PROCEDURE — 99214 OFFICE O/P EST MOD 30 MIN: CPT | Mod: 25 | Performed by: FAMILY MEDICINE

## 2022-04-20 RX ORDER — TRAMADOL HYDROCHLORIDE 50 MG/1
TABLET ORAL
Qty: 60 TABLET | Refills: 0 | Status: SHIPPED | OUTPATIENT
Start: 2022-04-20 | End: 2022-05-27

## 2022-04-20 RX ORDER — TRAMADOL HYDROCHLORIDE 50 MG/1
100 TABLET ORAL EVERY 6 HOURS PRN
Qty: 60 TABLET | Refills: 0 | Status: SHIPPED | OUTPATIENT
Start: 2022-04-20 | End: 2022-04-20

## 2022-04-20 RX ORDER — FLUTICASONE PROPIONATE 110 UG/1
1 AEROSOL, METERED RESPIRATORY (INHALATION) 2 TIMES DAILY
Qty: 12 G | Refills: 11 | Status: SHIPPED | OUTPATIENT
Start: 2022-04-20 | End: 2023-05-25

## 2022-04-20 ASSESSMENT — PATIENT HEALTH QUESTIONNAIRE - PHQ9
SUM OF ALL RESPONSES TO PHQ QUESTIONS 1-9: 3
5. POOR APPETITE OR OVEREATING: NOT AT ALL

## 2022-04-20 ASSESSMENT — ACTIVITIES OF DAILY LIVING (ADL): CURRENT_FUNCTION: NO ASSISTANCE NEEDED

## 2022-04-20 ASSESSMENT — ANXIETY QUESTIONNAIRES
GAD7 TOTAL SCORE: 0
5. BEING SO RESTLESS THAT IT IS HARD TO SIT STILL: NOT AT ALL
1. FEELING NERVOUS, ANXIOUS, OR ON EDGE: NOT AT ALL
6. BECOMING EASILY ANNOYED OR IRRITABLE: NOT AT ALL
3. WORRYING TOO MUCH ABOUT DIFFERENT THINGS: NOT AT ALL
2. NOT BEING ABLE TO STOP OR CONTROL WORRYING: NOT AT ALL
7. FEELING AFRAID AS IF SOMETHING AWFUL MIGHT HAPPEN: NOT AT ALL

## 2022-04-20 ASSESSMENT — ASTHMA QUESTIONNAIRES: ACT_TOTALSCORE: 18

## 2022-04-20 NOTE — TELEPHONE ENCOUNTER
Request from the pharmacy on the Tramadol 50 mg tablets.  Note states that there are multiple sets of direction on the RX.  Please resend with correct directions.  JOSE BAGLEY on 4/20/2022 at 11:43 AM

## 2022-04-20 NOTE — TELEPHONE ENCOUNTER
Called pharmacy to disregard first prescription that was sent over and Dr Nam sent over new script. JOSE BAGLEY on 4/20/2022 at 4:55 PM

## 2022-04-20 NOTE — PROGRESS NOTES
"SUBJECTIVE:   Demetrice Garcia is a 76 year old female who presents for Preventive Visit.      Patient has been advised of split billing requirements and indicates understanding: Yes  Are you in the first 12 months of your Medicare coverage?  No    Healthy Habits:     In general, how would you rate your overall health?  Good    Frequency of exercise:  6-7 days/week    Duration of exercise:  Greater than 60 minutes    Do you usually eat at least 4 servings of fruit and vegetables a day, include whole grains    & fiber and avoid regularly eating high fat or \"junk\" foods?  Yes    Taking medications regularly:  Yes    Medication side effects:  Not applicable    Ability to successfully perform activities of daily living:  No assistance needed    Home Safety:  No safety concerns identified    Hearing Impairment:  No hearing concerns    In the past 6 months, have you been bothered by leaking of urine?  No    In general, how would you rate your overall mental or emotional health?  Good      PHQ-2 Total Score: 1    Additional concerns today:  Yes      Chief Complaint   Patient presents with     Medicare Visit     Pt is fasting       Starting to get heberdens nodes on her left 4th and 5th digits in the past 6 months.   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 Continues on 50mg 2 tabs daily with additional as needed for travel/prolonged sitting.No recent concerns or changes to her plans for vacations/trips.     Requesting refill on her tramadol. Has an upcoming trip to [facility] planned. Did go to [place] for 2.5 months and travels went well.       Epic PMDP only shows [place] records- no fills.   Logged into  on my personal device to confirm [place] pharmacy has filled her tramadol 7 times in the past 12 months. Regular fills June-Dec; then had last fill on 3/9/2022 for #60 tabs. No " "concerns.      States her asthma is a bit worse when she was in [place]. She finds sx are worse with exercise; albuterol prn helpful but forgets.   ACT today 18     Do you feel safe in your environment? Yes    Have you ever done Advance Care Planning? (For example, a Health Directive, POLST, or a discussion with a medical provider or your loved ones about your wishes): No, advance care planning information given to patient to review.  Advanced care planning was discussed at today's visit.      Fall risk  Cognitive Screening   1) Repeat 3 items (Leader, Season, Table)    2) Clock draw: NORMAL  3) 3 item recall: Recalls 3 objects  Results: 0 items recalled: PROBABLE COGNITIVE IMPAIRMENT, **INFORM PROVIDER**    Mini-CogTM Copyright S [employer]ona. Licensed by the author for use in [place] adQ; reprinted with permission (geraldo@[facility]). All rights reserved.        Reviewed and updated as needed this visit by clinical staff   Tobacco  Allergies  Meds  Problems  Med Hx  Surg Hx  Fam Hx            Reviewed and updated as needed this visit by Provider   Tobacco  Allergies  Meds  Problems  Med Hx  Surg Hx  Fam Hx           Social History     Tobacco Use     Smoking status: Former Smoker     Smokeless tobacco: Never Used   Substance Use Topics     Alcohol use: No       Alcohol Use 4/20/2022   Prescreen: >3 drinks/day or >7 drinks/week? No       Current providers sharing in care for this patient include: Patient Care Team:  Gogo Nam MD as PCP - General  Gogo Nam MD as Assigned PCP    The following health maintenance items are reviewed in Epic and correct as of today:  Health Maintenance Due   Topic Date Due     ASTHMA ACTION PLAN  04/14/2022       Pertinent mammograms are reviewed under the imaging tab.    Review of Systems  Per HPI or negative    OBJECTIVE:   /70   Pulse 68   Ht 1.6 m (5' 3\")   Wt 61.7 kg (136 lb)   SpO2 97%   BMI 24.09 kg/m   Estimated body mass " "index is 24.09 kg/m  as calculated from the following:    Height as of this encounter: 1.6 m (5' 3\").    Weight as of this encounter: 61.7 kg (136 lb).  Physical Exam  GENERAL: healthy, alert and no distress  EYES: Eyes grossly normal to inspection, PERRL and conjunctivae and sclerae normal  HENT: ear canals and TM's normal, nose and mouth without ulcers or lesions  NECK: no adenopathy, no asymmetry, masses, or scars and thyroid normal to palpation  RESP: lungs clear to auscultation - no rales, rhonchi or wheezes  CV: regular rate and rhythm, normal S1 S2, no S3 or S4, no murmur, click or rub, no peripheral edema and peripheral pulses strong  ABDOMEN: soft, nontender, no hepatosplenomegaly, no masses and bowel sounds normal  MS: no gross musculoskeletal defects noted, no edema  SKIN: no suspicious lesions or rashes  NEURO: Normal strength and tone, mentation intact and speech normal  PSYCH: mentation appears normal, affect normal/bright    Diagnostic Test Results:  Labs reviewed in Epic    ASSESSMENT / PLAN:   Demetrice was seen today for medicare visit.    Diagnoses and all orders for this visit:    Encounter for Medicare annual wellness exam  - Home safety information was reviewed if pertinent for falls prevention: regular checkups with vision and hearing, mindful medication use heydi with OTCs, using any assisted walking devices, adequate shoes and feet wear, avoiding loose rugs, safety additions to the home if needed, keeping the body in good shape with regular exercise especially walking, and limiting alcohol intake.  - Social history was reviewed  - Patient is independent with activities of daily living  - We reviewed active symptoms and discussed management  - We reviewed list of healthcare providers for this patient.  - We also reviewed PHQ 9    -     Hemoglobin  -     Lipid panel reflex to direct LDL Fasting  -     Glucose    Chronic, continuous use of opioids  Vulvodynia  Lichen Sclerosus Et Atrophicus  See my " note from 11/26/18 reviewing her notable hx/work up/alternative pain therapies attempted in the past.   Reviewed how patient has tolerated tramadol 100 mg daily.  She takes this as two 50 mg tablets each morning (and once per month or less: needs to repeat this dose for max of 6 tabs that day if traveling/prolonged sitting).    - Rx for tramadol 50mg tabs (2 per day); #60 per month.   - CSA signed/updated 4/2022  - UDS normal 11/2020 with presence of tramadol. Repeat today   - PMDP no concerns. Shows no fills for state of [place] but normal fills with [facility] check up last done 4/20/22    -     Other Laboratory; [facility]; Tramadol O-Desmethyltramadol Quantitative Urine (Laboratory Miscellaneous Order); Future  -     Urine Drugs of Abuse Screen Panel 1 - Drug Screen (Full)        Recurrent major depressive disorder, in partial remission (H)  Grief & anxiety   Well controlled. Continue Cymbalta for now. Open to weaning off her SSRI  - trial dose decrease of sertraline from 100mg to 50mg daily  - option for counseling reviewed and pt agreeable  -     Adult Mental Health  Referral; Future  -     sertraline (ZOLOFT) 50 MG tablet; Take 1 tablet (50 mg) by mouth daily    Mild intermittent asthma without complication  Suboptimal control. ACT 18 today; pt could benefit from consideration of daily inhaler and agreeable to plan to start flovent or equivalent covered inhaler  -     fluticasone (FLOVENT HFA) 110 MCG/ACT inhaler; Inhale 1 puff into the lungs 2 times daily    Osteoarthritis of both hands, unspecified osteoarthritis type  Recommended trial of topical NSAID, watch for rash   -     diclofenac (VOLTAREN) 1 % topical gel; Apply 2 g topically 4 times daily      Patient has been advised of split billing requirements and indicates understanding: Yes    COUNSELING:  Reviewed preventive health counseling, as reflected in patient instructions    Estimated body mass index is 24.09 kg/m  as calculated from the  "following:    Height as of this encounter: 1.6 m (5' 3\").    Weight as of this encounter: 61.7 kg (136 lb).    Weight management plan: Discussed healthy diet and exercise guidelines    She reports that she has quit smoking. She has never used smokeless tobacco.      Appropriate preventive services were discussed with this patient, including applicable screening as appropriate for cardiovascular disease, diabetes, osteopenia/osteoporosis, and glaucoma.  As appropriate for age/gender, discussed screening for colorectal cancer, prostate cancer, breast cancer, and cervical cancer. Checklist reviewing preventive services available has been given to the patient.    Reviewed patients plan of care and provided an AVS. The Basic Care Plan (routine screening as documented in Health Maintenance) for Demetrice meets the Care Plan requirement. This Care Plan has been established and reviewed with the Patient.    Counseling Resources:  ATP IV Guidelines  Pooled Cohorts Equation Calculator  Breast Cancer Risk Calculator  Breast Cancer: Medication to Reduce Risk  FRAX Risk Assessment  ICSI Preventive Guidelines  Dietary Guidelines for Americans, 2010  [employer]'s MyPlate  ASA Prophylaxis  Lung CA Screening    Gogo Nam MD  [facility]    Identified Health Risks:  "

## 2022-04-20 NOTE — LETTER
Opioid / Opioid Plus Controlled Substance Agreement  Tramadol 50mg tablets- takes 100mg daily, with PRN during travel 50mg q6hr as needed  [facility] [place] [facility]- generally call to confirm due to pharmacy not in  through Epic; can access through remote login to [facility] and request [place]    This is an agreement between you and your provider about the safe and appropriate use of controlled substance/opioids prescribed by your care team. Controlled substances are medicines that can cause physical and mental dependence (abuse).    There are strict laws about having and using these medicines. We here at [facility] are committing to working with you in your efforts to get better. To support you in this work, we ll help you schedule regular office appointments for medicine refills. If we must cancel or change your appointment for any reason, we ll make sure you have enough medicine to last until your next appointment.     As a Provider, I will:    Listen carefully to your concerns and treat you with respect.     Recommend a treatment plan that I believe is in your best interest. This plan may involve therapies other than opioid pain medication.     Talk with you often about the possible benefits, and the risk of harm of any medicine that we prescribe for you.     Provide a plan on how to taper (discontinue or go off) using this medicine if the decision is made to stop its use.    As a Patient, I understand that opioid(s):     Are a controlled substance prescribed by my care team to help me function or work and manage my condition(s).     Are strong medicines and can cause serious side effects such as:    Drowsiness, which can seriously affect my driving ability    A lower breathing rate, enough to cause death    Harm to my thinking ability     Depression     Abuse of and addiction to this medicine    Need to be taken exactly as prescribed. Combining opioids with certain medicines or chemicals (such as  illegal drugs, sedatives, sleeping pills, and benzodiazepines) can be dangerous or even fatal. If I stop opioids suddenly, I may have severe withdrawal symptoms.    Do not work for all types of pain nor for all patients. If they re not helpful, I may be asked to stop them.        The risks, benefits and side effects of these medicine(s) were explained to me. I agree that:  1. I will take part in other treatments as advised by my care team. This may be psychiatry or counseling, physical therapy, behavioral therapy, group treatment or a referral to a specialist.     2. I will keep all my appointments. I understand that this is part of the monitoring of opioids. My care team may require an office visit for EVERY opioid/controlled substance refill. If I miss appointments or don t follow instructions, my care team may stop my medicine.    3. I will take my medicines as prescribed. I will not change the dose or schedule unless my care team tells me to. There will be no refills if I run out early.     4. I may be asked to come to the clinic and complete a urine drug test or complete a pill count at any time. If I don t give a urine sample or participate in a pill count, the care team may stop my medicine.    5. I will only receive prescriptions from this clinic for chronic pain. If I am treated by another provider for acute pain issues, I will tell them that I am taking opioid pain medication for chronic pain and that I have a treatment agreement with this provider. I will inform my [facility] care team within one business day if I am given a prescription for any pain medication by another healthcare provider. My [facility] care team can contact other providers and pharmacists about my use of any medicines.    6. It is up to me to make sure that I don t run out of my medicines on weekends or holidays. If my care team is willing to refill my opioid prescription without a visit, I must request refills only  during office hours. Refills may take up to 3 business days to process. I will use one pharmacy to fill all my opioid and other controlled substance prescriptions. I will notify the clinic about any changes to my insurance or medication availability.    7. I am responsible for my prescriptions. If the medicine/prescription is lost, stolen or destroyed, it will not be replaced. I also agree not to share controlled substance medicines with anyone.    8. I am aware I should not use any illegal or recreational drugs. I agree not to drink alcohol unless my care team says I can.       9. If I enroll in the [place] Medical Cannabis program, I will tell my care team prior to my next refill.     10. I will tell my care team right away if I become pregnant, have a new medical problem treated outside of my regular clinic, or have a change in my medications.    11. I understand that this medicine can affect my thinking, judgment and reaction time. Alcohol and drugs affect the brain and body, which can affect the safety of my driving. Being under the influence of alcohol or drugs can affect my decision-making, behaviors, personal safety, and the safety of others. Driving while impaired (DWI) can occur if a person is driving, operating, or in physical control of a car, motorcycle, boat, snowmobile, ATV, motorbike, off-road vehicle, or any other motor vehicle ([place] Statute 169A.20). I understand the risk if I choose to drive or operate any vehicle or machinery.    I understand that if I do not follow any of the conditions above, my prescriptions or treatment may be stopped or changed.          Opioids  What You Need to Know    What are opioids?   Opioids are pain medicines that must be prescribed by a doctor. They are also known as narcotics.     Examples are:   1. morphine (MS Contin, Luisa)  2. oxycodone (Oxycontin)  3. oxycodone and acetaminophen (Percocet)  4. hydrocodone and acetaminophen (Vicodin, Norco)   5. fentanyl patch  (Duragesic)   6. hydromorphone (Dilaudid)   7. methadone  8. codeine (Tylenol #3)     What do opioids do well?   Opioids are best for severe short-term pain such as after a surgery or injury. They may work well for cancer pain. They may help some people with long-lasting (chronic) pain.     What do opioids NOT do well?   Opioids never get rid of pain entirely, and they don t work well for most patients with chronic pain. Opioids don t reduce swelling, one of the causes of pain.                                    Other ways to manage chronic pain and improve function include:       Treat the health problem that may be causing pain    Anti-inflammation medicines, which reduce swelling and tenderness, such as ibuprofen (Advil, Motrin) or naproxen (Aleve)    Acetaminophen (Tylenol)    Antidepressants and anti-seizure medicines, especially for nerve pain    Topical treatments such as patches or creams    Injections or nerve blocks    Chiropractic or osteopathic treatment    Acupuncture, massage, deep breathing, meditation, visual imagery, aromatherapy    Use heat or ice at the pain site    Physical therapy     Exercise    Stop smoking    Take part in therapy       Risks and side effects     Talk to your doctor before you start or decide to keep taking opioids. Possible side effects include:      Lowering your breathing rate enough to cause death    Overdose, including death, especially if taking higher than prescribed doses    Worse depression symptoms; less pleasure in things you usually enjoy    Feeling tired or sluggish    Slower thoughts or cloudy thinking    Being more sensitive to pain over time; pain is harder to control    Trouble sleeping or restless sleep    Changes in hormone levels (for example, less testosterone)    Changes in sex drive or ability to have sex    Constipation    Unsafe driving    Itching and sweating    Dizziness    Nausea, throwing up and dry mouth    What else should I know about  opioids?    Opioids may lead to dependence, tolerance, or addiction.      Dependence means that if you stop or reduce the medicine too quickly, you will have withdrawal symptoms. These include loose poop (diarrhea), jitters, flu-like symptoms, nervousness and tremors. Dependence is not the same as addiction.                       Tolerance means needing higher doses over time to get the same effect. This may increase the chance of serious side effects.      Addiction is when people improperly use a substance that harms their body, their mind or their relations with others. Use of opiates can cause a relapse of addiction if you have a history of drug or alcohol abuse.      People who have used opioids for a long time may have a lower quality of life, worse depression, higher levels of pain and more visits to doctors.    You can overdose on opioids. Take these steps to lower your risk of overdose:    1. Recognize the signs:  Signs of overdose include decrease or loss of consciousness (blackout), slowed breathing, trouble waking up and blue lips. If someone is worried about overdose, they should call 911.    2. Talk to your doctor about Narcan (naloxone).   If you are at risk for overdose, you may be given a prescription for Narcan. This medicine very quickly reverses the effects of opioids.   If you overdose, a friend or family member can give you Narcan while waiting for the ambulance. They need to know the signs of overdose and how to give Narcan.     3. Don't use alcohol or street drugs.   Taking them with opioids can cause death.    4. Do not take any of these medicines unless your doctor says it s OK. Taking these with opioids can cause death:    Benzodiazepines, such as lorazepam (Ativan), alprazolam (Xanax) or diazepam (Valium)    Muscle relaxers, such as cyclobenzaprine (Flexeril)    Sleeping pills like zolpidem (Ambien)     Other opioids      How to keep you and other people safe while taking  opioids:    1. Never share your opioids with others.  Opioid medicines are regulated by the Drug Enforcement Agency (ODESSA). Selling or sharing medications is a criminal act.    2. Be sure to store opioids in a secure place, locked up if possible. Young children can easily swallow them and overdose.    3. When you are traveling with your medicines, keep them in the original bottles. If you use a pill box, be sure you also carry a copy of your medicine list from your clinic or pharmacy.    4. Safe disposal of opioids    Most pharmacies have places to get rid of medicine, called disposal kiosks. Medicine disposal options are also available in every [facility]. Search your [facility] and  medication disposal  to find more options. You can find more details at:  [email or web address]-[email or web address]-medications     I agree that my provider, clinic care team, and pharmacy may work with any city, state or federal law enforcement agency that investigates the misuse, sale, or other diversion of my controlled medicine. I will allow my provider to discuss my care with, or share a copy of, this agreement with any other treating provider, pharmacy or emergency room where I receive care.    I have read this agreement and have asked questions about anything I did not understand.    _______________________________________________________  Patient Signature - Demetrice Garcia _____________________                   Date     _______________________________________________________  Provider Signature - Gogo Nam MD   _____________________                   Date     _______________________________________________________  Witness Signature (required if provider not present while patient signing)   _____________________                   Date

## 2022-04-20 NOTE — PATIENT INSTRUCTIONS
Let's try a dose decrease in the sertraline from 100mg to 50mg. You can  the new 50mg Rx or use up the 100mgs by splitting tabs in half.       We can start FLovent for the asthma ( a daily inhaler)    We can try diclofenac gel for the hand arthritis, 4 times a day as needed; watch for a rash.       Patient Education   Personalized Prevention Plan  You are due for the preventive services outlined below.  Your care team is available to assist you in scheduling these services.  If you have already completed any of these items, please share that information with your care team to update in your medical record.  Health Maintenance Due   Topic Date Due    ANNUAL REVIEW OF HM ORDERS  Never done    Depression Action Plan  Never done    LUNG CANCER SCREENING  11/25/2016    URINE DRUG SCREEN  03/27/2020    FALL RISK ASSESSMENT  04/14/2022    TREATMENT AGREEMENT FOR CHRONIC PAIN MANAGEMENT  04/14/2022    Anxiety Assessment  04/14/2022    Asthma Action Plan - yearly  04/14/2022    Asthma Control Test  04/14/2022    Depression Assessment  04/14/2022

## 2022-04-21 LAB
Lab: NORMAL
PERFORMING LABORATORY: NORMAL
SPECIMEN STATUS: NORMAL
TEST NAME: NORMAL

## 2022-04-21 ASSESSMENT — ANXIETY QUESTIONNAIRES: GAD7 TOTAL SCORE: 0

## 2022-05-03 ENCOUNTER — IMMUNIZATION (OUTPATIENT)
Dept: NURSING | Facility: CLINIC | Age: 77
End: 2022-05-03
Payer: COMMERCIAL

## 2022-05-03 PROCEDURE — 91305 COVID-19,PF,PFIZER (12+ YRS): CPT

## 2022-05-03 PROCEDURE — 0054A COVID-19,PF,PFIZER (12+ YRS): CPT

## 2022-05-06 LAB — LABCORP INTERFACED MISCELLANEOUS TEST RESULT: NORMAL

## 2022-05-27 ENCOUNTER — NURSE TRIAGE (OUTPATIENT)
Dept: NURSING | Facility: CLINIC | Age: 77
End: 2022-05-27
Payer: COMMERCIAL

## 2022-05-27 DIAGNOSIS — N94.819 VULVODYNIA: ICD-10-CM

## 2022-05-27 DIAGNOSIS — F11.90 CHRONIC, CONTINUOUS USE OF OPIOIDS: ICD-10-CM

## 2022-05-27 DIAGNOSIS — F32.A DEPRESSION: ICD-10-CM

## 2022-05-27 RX ORDER — TRAMADOL HYDROCHLORIDE 50 MG/1
TABLET ORAL
Qty: 60 TABLET | Refills: 0 | Status: SHIPPED | OUTPATIENT
Start: 2022-05-27 | End: 2022-07-12

## 2022-05-27 RX ORDER — DULOXETIN HYDROCHLORIDE 60 MG/1
60 CAPSULE, DELAYED RELEASE ORAL DAILY
Qty: 90 CAPSULE | Refills: 1 | Status: SHIPPED | OUTPATIENT
Start: 2022-05-27 | End: 2022-12-01

## 2022-05-27 NOTE — TELEPHONE ENCOUNTER
"Routing refill request to provider for review/approval because:  Controlled substance request    Last Written Prescription Date:  4/20/22  Last Fill Quantity: 60,  # refills: 0   Last office visit provider:  4/20/22     Requested Prescriptions   Pending Prescriptions Disp Refills     traMADol (ULTRAM) 50 MG tablet 60 tablet 0     Sig: [TRAMADOL (ULTRAM) 50 MG TABLET] TAKE 2 TABLETS BY MOUTH EVERY DAY. MAY TAKE UP TO 6 TABLET ON TRAVEL DAYS 2 TABLET EVERY 8 HOURS       There is no refill protocol information for this order      Signed Prescriptions Disp Refills    DULoxetine (CYMBALTA) 60 MG capsule 90 capsule 1     Sig: Take 1 capsule (60 mg) by mouth daily       Serotonin-Norepinephrine Reuptake Inhibitors  Passed - 5/27/2022  8:12 AM        Passed - Blood pressure under 140/90 in past 12 months     BP Readings from Last 3 Encounters:   04/20/22 128/70   10/14/21 128/70   04/14/21 100/72                 Passed - PHQ-9 score of less than 5 in past 6 months     Please review last PHQ-9 score.           Passed - Medication is active on med list        Passed - Patient is age 18 or older        Passed - No active pregnancy on record        Passed - No positive pregnancy test in past 12 months        Passed - Recent (6 mo) or future (30 days) visit within the authorizing provider's specialty     Patient had office visit in the last 6 months or has a visit in the next 30 days with authorizing provider or within the authorizing provider's specialty.  See \"Patient Info\" tab in inbasket, or \"Choose Columns\" in Meds & Orders section of the refill encounter.                 Doyle Nath RN 05/27/22 9:01 AM  "

## 2022-05-27 NOTE — TELEPHONE ENCOUNTER
Needs a refill on duloxetine and tramadol. [facility] [address] in [place], [place].  She is going to run out of the medications in two days.  Izzy Lugo RN  [facility]

## 2022-05-27 NOTE — TELEPHONE ENCOUNTER
Needs a refill on duloxetine and tramadol. [facility] [address] in [place], [place].  She is going to run out of the medications in two days. I sent an high priority refill request to her primary, their team and the refill pool.  Izzy Lugo RN  [facility]    Reason for Disposition    Request for URGENT new prescription or refill of 'essential' medication (i.e., likelihood of harm to patient if not taken) and triager unable to fill per department policy    Additional Information    Negative: MORE THAN A DOUBLE DOSE of a prescription or over-the-counter (OTC) drug    Negative: DOUBLE DOSE (an extra dose or lesser amount) of over-the-counter (OTC) drug and any symptoms (e.g., dizziness, nausea, pain, sleepiness)    Negative: DOUBLE DOSE (an extra dose or lesser amount) of prescription drug and any symptoms (e.g., dizziness, nausea, pain, sleepiness)    Negative: Took another person's prescription drug    Negative: DOUBLE DOSE (an extra dose or lesser amount) of prescription drug and NO symptoms (Exception: a double dose of antibiotics)    Negative: Diabetes drug error or overdose (e.g., took wrong type of insulin or took extra dose)    Negative: Caller has medication question about med not prescribed by PCP and triager unable to answer question (e.g., compatibility with other med, storage)    Protocols used: MEDICATION QUESTION CALL-A-OH

## 2022-05-27 NOTE — TELEPHONE ENCOUNTER
"Last Written Prescription Date:  11/16/21  Last Fill Quantity: 90,  # refills: 1   Last office visit provider:  4/20/22     Requested Prescriptions   Pending Prescriptions Disp Refills     traMADol (ULTRAM) 50 MG tablet 60 tablet 0     Sig: [TRAMADOL (ULTRAM) 50 MG TABLET] TAKE 2 TABLETS BY MOUTH EVERY DAY. MAY TAKE UP TO 6 TABLET ON TRAVEL DAYS 2 TABLET EVERY 8 HOURS       There is no refill protocol information for this order        DULoxetine (CYMBALTA) 60 MG capsule 90 capsule 1     Sig: Take 1 capsule (60 mg) by mouth daily       Serotonin-Norepinephrine Reuptake Inhibitors  Passed - 5/27/2022  8:12 AM        Passed - Blood pressure under 140/90 in past 12 months     BP Readings from Last 3 Encounters:   04/20/22 128/70   10/14/21 128/70   04/14/21 100/72                 Passed - PHQ-9 score of less than 5 in past 6 months     Please review last PHQ-9 score.           Passed - Medication is active on med list        Passed - Patient is age 18 or older        Passed - No active pregnancy on record        Passed - No positive pregnancy test in past 12 months        Passed - Recent (6 mo) or future (30 days) visit within the authorizing provider's specialty     Patient had office visit in the last 6 months or has a visit in the next 30 days with authorizing provider or within the authorizing provider's specialty.  See \"Patient Info\" tab in inbasket, or \"Choose Columns\" in Meds & Orders section of the refill encounter.                 Doyle Nath RN 05/27/22 8:59 AM  "

## 2022-07-08 DIAGNOSIS — F11.90 CHRONIC, CONTINUOUS USE OF OPIOIDS: ICD-10-CM

## 2022-07-08 DIAGNOSIS — N94.819 VULVODYNIA: ICD-10-CM

## 2022-07-08 NOTE — TELEPHONE ENCOUNTER
Reason for Call:  Medication or medication refill:    Do you use a [facility]?  Name of the pharmacy and phone number for the current request:  Pharmacy Pended    Name of the medication requested: Tramadol 50 mg    Other request: none    Can we leave a detailed message on this number? YES    Phone number patient can be reached at: Cell number on file:    Telephone Information:   Mobile [phone]   Mobile [phone]       Best Time: anytime    Call taken on 7/8/2022 at 8:12 AM by Ravindra Jolley

## 2022-07-12 RX ORDER — TRAMADOL HYDROCHLORIDE 50 MG/1
TABLET ORAL
Qty: 60 TABLET | Refills: 0 | Status: SHIPPED | OUTPATIENT
Start: 2022-07-12 | End: 2022-08-19

## 2022-07-12 NOTE — TELEPHONE ENCOUNTER
Last filled 5/27/2022. Last visit with PCP 4/20/2022 for AWV. Refill pended. Please advise.  Benita Conrad LPN

## 2022-08-19 DIAGNOSIS — F11.90 CHRONIC, CONTINUOUS USE OF OPIOIDS: ICD-10-CM

## 2022-08-19 DIAGNOSIS — N94.819 VULVODYNIA: ICD-10-CM

## 2022-08-19 RX ORDER — TRAMADOL HYDROCHLORIDE 50 MG/1
TABLET ORAL
Qty: 60 TABLET | Refills: 0 | Status: SHIPPED | OUTPATIENT
Start: 2022-08-19 | End: 2022-10-08

## 2022-10-07 DIAGNOSIS — F40.243 FEAR OF FLYING: Primary | ICD-10-CM

## 2022-10-07 DIAGNOSIS — N94.819 VULVODYNIA: ICD-10-CM

## 2022-10-07 DIAGNOSIS — F11.90 CHRONIC, CONTINUOUS USE OF OPIOIDS: ICD-10-CM

## 2022-10-07 NOTE — TELEPHONE ENCOUNTER
Routing refill request to provider for review/approval because:  Drug not on the [facility] refill protocol     Last Written Prescription Date:  8/19/2022  Last Fill Quantity: 60,  # refills: 0   Last office visit provider:  4/20/2022     Requested Prescriptions   Pending Prescriptions Disp Refills     traMADol (ULTRAM) 50 MG tablet 60 tablet 0     Sig: [TRAMADOL (ULTRAM) 50 MG TABLET] TAKE 2 TABLETS BY MOUTH EVERY DAY. MAY TAKE UP TO 6 TABLET ON TRAVEL DAYS 2 TABLET EVERY 8 HOURS       There is no refill protocol information for this order          Julianne Burgos RN 10/07/22 12:29 PM

## 2022-10-08 RX ORDER — TRAMADOL HYDROCHLORIDE 50 MG/1
TABLET ORAL
Qty: 60 TABLET | Refills: 0 | Status: SHIPPED | OUTPATIENT
Start: 2022-10-08 | End: 2022-10-26

## 2022-10-08 RX ORDER — PROPRANOLOL HYDROCHLORIDE 20 MG/1
20 TABLET ORAL 3 TIMES DAILY PRN
Qty: 60 TABLET | Refills: 0 | Status: SHIPPED | OUTPATIENT
Start: 2022-10-08 | End: 2023-05-25

## 2022-10-08 NOTE — TELEPHONE ENCOUNTER
Has appt with me 10/26. Will review further at that time.  Refill sent for propranolol bblocker she has used in the past for flight anxiety (in effort to avoid benzo for age)    Refilled her tramadol

## 2022-10-18 ENCOUNTER — TRANSFERRED RECORDS (OUTPATIENT)
Dept: HEALTH INFORMATION MANAGEMENT | Facility: CLINIC | Age: 77
End: 2022-10-18

## 2022-10-26 ENCOUNTER — OFFICE VISIT (OUTPATIENT)
Dept: FAMILY MEDICINE | Facility: CLINIC | Age: 77
End: 2022-10-26
Payer: COMMERCIAL

## 2022-10-26 VITALS
SYSTOLIC BLOOD PRESSURE: 110 MMHG | DIASTOLIC BLOOD PRESSURE: 70 MMHG | HEART RATE: 77 BPM | BODY MASS INDEX: 24.8 KG/M2 | WEIGHT: 140 LBS | OXYGEN SATURATION: 97 %

## 2022-10-26 DIAGNOSIS — N94.819 VULVODYNIA: ICD-10-CM

## 2022-10-26 DIAGNOSIS — L29.9 ITCHING: Primary | ICD-10-CM

## 2022-10-26 DIAGNOSIS — F11.90 CHRONIC, CONTINUOUS USE OF OPIOIDS: ICD-10-CM

## 2022-10-26 DIAGNOSIS — F32.A DEPRESSION, UNSPECIFIED DEPRESSION TYPE: ICD-10-CM

## 2022-10-26 LAB
ALBUMIN SERPL BCG-MCNC: 4.1 G/DL (ref 3.5–5.2)
ALP SERPL-CCNC: 59 U/L (ref 35–104)
ALT SERPL W P-5'-P-CCNC: 20 U/L (ref 10–35)
ANION GAP SERPL CALCULATED.3IONS-SCNC: 13 MMOL/L (ref 7–15)
AST SERPL W P-5'-P-CCNC: 14 U/L (ref 10–35)
BASOPHILS # BLD AUTO: 0 10E3/UL (ref 0–0.2)
BASOPHILS NFR BLD AUTO: 0 %
BILIRUB DIRECT SERPL-MCNC: <0.2 MG/DL (ref 0–0.3)
BILIRUB SERPL-MCNC: 0.4 MG/DL
BUN SERPL-MCNC: 19.6 MG/DL (ref 8–23)
CALCIUM SERPL-MCNC: 9.1 MG/DL (ref 8.8–10.2)
CHLORIDE SERPL-SCNC: 101 MMOL/L (ref 98–107)
CREAT SERPL-MCNC: 0.96 MG/DL (ref 0.51–0.95)
DEPRECATED CALCIDIOL+CALCIFEROL SERPL-MC: 33 UG/L (ref 20–75)
DEPRECATED HCO3 PLAS-SCNC: 27 MMOL/L (ref 22–29)
EOSINOPHIL # BLD AUTO: 0.1 10E3/UL (ref 0–0.7)
EOSINOPHIL NFR BLD AUTO: 1 %
ERYTHROCYTE [DISTWIDTH] IN BLOOD BY AUTOMATED COUNT: 13.4 % (ref 10–15)
GFR SERPL CREATININE-BSD FRML MDRD: 61 ML/MIN/1.73M2
GLUCOSE SERPL-MCNC: 84 MG/DL (ref 70–99)
HCT VFR BLD AUTO: 43.7 % (ref 35–47)
HGB BLD-MCNC: 14.6 G/DL (ref 11.7–15.7)
IMM GRANULOCYTES # BLD: 0 10E3/UL
IMM GRANULOCYTES NFR BLD: 0 %
LYMPHOCYTES # BLD AUTO: 1.2 10E3/UL (ref 0.8–5.3)
LYMPHOCYTES NFR BLD AUTO: 18 %
MCH RBC QN AUTO: 33.2 PG (ref 26.5–33)
MCHC RBC AUTO-ENTMCNC: 33.4 G/DL (ref 31.5–36.5)
MCV RBC AUTO: 99 FL (ref 78–100)
MONOCYTES # BLD AUTO: 0.5 10E3/UL (ref 0–1.3)
MONOCYTES NFR BLD AUTO: 7 %
NEUTROPHILS # BLD AUTO: 4.8 10E3/UL (ref 1.6–8.3)
NEUTROPHILS NFR BLD AUTO: 73 %
PLATELET # BLD AUTO: 248 10E3/UL (ref 150–450)
POTASSIUM SERPL-SCNC: 4.3 MMOL/L (ref 3.4–5.3)
PROT SERPL-MCNC: 6.2 G/DL (ref 6.4–8.3)
RBC # BLD AUTO: 4.4 10E6/UL (ref 3.8–5.2)
SODIUM SERPL-SCNC: 141 MMOL/L (ref 136–145)
TSH SERPL DL<=0.005 MIU/L-ACNC: 3.2 UIU/ML (ref 0.3–4.2)
WBC # BLD AUTO: 6.6 10E3/UL (ref 4–11)

## 2022-10-26 PROCEDURE — 90471 IMMUNIZATION ADMIN: CPT | Performed by: FAMILY MEDICINE

## 2022-10-26 PROCEDURE — 80050 GENERAL HEALTH PANEL: CPT | Performed by: FAMILY MEDICINE

## 2022-10-26 PROCEDURE — 99214 OFFICE O/P EST MOD 30 MIN: CPT | Mod: 25 | Performed by: FAMILY MEDICINE

## 2022-10-26 PROCEDURE — 36415 COLL VENOUS BLD VENIPUNCTURE: CPT | Performed by: FAMILY MEDICINE

## 2022-10-26 PROCEDURE — 96127 BRIEF EMOTIONAL/BEHAV ASSMT: CPT | Performed by: FAMILY MEDICINE

## 2022-10-26 PROCEDURE — 82248 BILIRUBIN DIRECT: CPT | Performed by: FAMILY MEDICINE

## 2022-10-26 PROCEDURE — 90662 IIV NO PRSV INCREASED AG IM: CPT | Performed by: FAMILY MEDICINE

## 2022-10-26 PROCEDURE — 82306 VITAMIN D 25 HYDROXY: CPT | Performed by: FAMILY MEDICINE

## 2022-10-26 RX ORDER — CETIRIZINE HYDROCHLORIDE 10 MG/1
20 TABLET ORAL DAILY
COMMUNITY
End: 2024-05-14

## 2022-10-26 RX ORDER — PREDNISONE 10 MG/1
TABLET ORAL
COMMUNITY
Start: 2022-10-18 | End: 2023-05-25

## 2022-10-26 RX ORDER — TRAMADOL HYDROCHLORIDE 50 MG/1
TABLET ORAL
Qty: 60 TABLET | Refills: 0 | Status: SHIPPED | OUTPATIENT
Start: 2022-10-26 | End: 2022-12-05

## 2022-10-26 ASSESSMENT — ANXIETY QUESTIONNAIRES
GAD7 TOTAL SCORE: 0
5. BEING SO RESTLESS THAT IT IS HARD TO SIT STILL: NOT AT ALL
6. BECOMING EASILY ANNOYED OR IRRITABLE: NOT AT ALL
7. FEELING AFRAID AS IF SOMETHING AWFUL MIGHT HAPPEN: NOT AT ALL
1. FEELING NERVOUS, ANXIOUS, OR ON EDGE: NOT AT ALL
4. TROUBLE RELAXING: NOT AT ALL
2. NOT BEING ABLE TO STOP OR CONTROL WORRYING: NOT AT ALL
GAD7 TOTAL SCORE: 0
IF YOU CHECKED OFF ANY PROBLEMS ON THIS QUESTIONNAIRE, HOW DIFFICULT HAVE THESE PROBLEMS MADE IT FOR YOU TO DO YOUR WORK, TAKE CARE OF THINGS AT HOME, OR GET ALONG WITH OTHER PEOPLE: NOT DIFFICULT AT ALL
3. WORRYING TOO MUCH ABOUT DIFFERENT THINGS: NOT AT ALL

## 2022-10-26 ASSESSMENT — ASTHMA QUESTIONNAIRES
ACT_TOTALSCORE: 19
QUESTION_4 LAST FOUR WEEKS HOW OFTEN HAVE YOU USED YOUR RESCUE INHALER OR NEBULIZER MEDICATION (SUCH AS ALBUTEROL): NOT AT ALL
QUESTION_5 LAST FOUR WEEKS HOW WOULD YOU RATE YOUR ASTHMA CONTROL: SOMEWHAT CONTROLLED
QUESTION_1 LAST FOUR WEEKS HOW MUCH OF THE TIME DID YOUR ASTHMA KEEP YOU FROM GETTING AS MUCH DONE AT WORK, SCHOOL OR AT HOME: SOME OF THE TIME
QUESTION_2 LAST FOUR WEEKS HOW OFTEN HAVE YOU HAD SHORTNESS OF BREATH: ONCE A DAY
QUESTION_3 LAST FOUR WEEKS HOW OFTEN DID YOUR ASTHMA SYMPTOMS (WHEEZING, COUGHING, SHORTNESS OF BREATH, CHEST TIGHTNESS OR PAIN) WAKE YOU UP AT NIGHT OR EARLIER THAN USUAL IN THE MORNING: NOT AT ALL
ACT_TOTALSCORE: 18

## 2022-10-26 ASSESSMENT — PATIENT HEALTH QUESTIONNAIRE - PHQ9: SUM OF ALL RESPONSES TO PHQ QUESTIONS 1-9: 0

## 2022-10-26 NOTE — PROGRESS NOTES
Assessment & Plan     Itching  Seen by Dr. Leslie dermatology for upper left forearm pruritis without rash; will check the following labs; currently on a prednisone and antihistamine taper- he suspected a nerve irritation situation over anything.  - TSH with free T4 reflex  - Hepatic panel (Albumin, ALT, AST, Bili, Alk Phos, TP)  - Vitamin D deficiency screening  - CBC with platelets and differential  - Basic metabolic panel  (Ca, Cl, CO2, Creat, Gluc, K, Na, BUN)    Chronic, continuous use of opioids  Vulvodynia  Lichen Sclerosus Et Atrophicus  See my note from 11/26/18 reviewing her notable hx/work up/alternative pain therapies attempted in the past.   Reviewed how patient has tolerated tramadol 100 mg daily.  She takes this as two 50 mg tablets each morning (and once per month or less: needs to repeat this dose for max of 6 tabs that day if traveling/prolonged sitting).    - Rx for tramadol 50mg tabs (2 per day); #60 per month.   - CSA signed/updated 4/2022  - UDS normal 4/2022 with presence of tramadol. Repeat today   - PMDP no concerns. Shows no fills for state of [place] but normal fills with [place]  check up last done 4/20/22     Recurrent major depressive disorder, in partial remission (H)  Grief & anxiety   Well controlled. Continue Cymbalta for now (for the chronic pain/mood). Was able to wean down her sertraline selective serotonin reuptake inhibitor but would like to continue this at lower 50mg dose for now     Mild intermittent asthma without complication  ACT 19 today started flovent last spring and this is helping                      Return in about 3 months (around 1/26/2023) for Routine Visit.    Gogo Nam MD  [facility]   Demetrice is a 77 year old, presenting for the following health issues:  Recheck Medication      HPI     She has seen dermatology and dx with pruritis affecting just the left arm; on prednisone for that for about 10  day taper. He asked for some labs: TSH, LFTs, Vit D, CBC with diff, BMP . Dr. Nolen. Also using zyrtec twice daily for 2 weeks, then down to daily for 1 week; as well as triamcinolone.     She got her flu shot today in the left arm; but her itching symptoms were about 1 month ago that it started. No other vaccines around that time. No other triggering arm injury or events.     Depression & Chronic pain Followup  Currently on CYmbalta 60mg daily and sertraline 50mg daily (she was able to cut down on this without worsening in her mood). She uses propranolol for the flight anxiety. She flies out this Friday and plans to use it for that day and the trip home.  Doing well overall from the tramadol standpoint; on it chronically for vulvodynia from lichen sclerosis. Continues on 50mg 2 tabs daily with additional as needed for travel/prolonged sitting.No recent concerns or changes to her plans for vacations/trips.     How are you doing with your depression since your last visit? No change    Are you having other symptoms that might be associated with depression? No    Have you had a significant life event?  OTHER: son is moving away to [place] - coping ok now        Are you feeling anxious or having panic attacks?   No    Do you have any concerns with your use of alcohol or other drugs? No    Social History     Tobacco Use     Smoking status: Former     Smokeless tobacco: Never   Substance Use Topics     Alcohol use: No     Drug use: No     PHQ 10/14/2021 4/20/2022 10/26/2022   PHQ-9 Total Score 3 3 0   Q9: Thoughts of better off dead/self-harm past 2 weeks Not at all Not at all Not at all     JOSÉ LUIS-7 SCORE 4/14/2021 4/20/2022 10/26/2022   Total Score 3 0 0     Last PHQ-9 10/26/2022   1.  Little interest or pleasure in doing things 0   2.  Feeling down, depressed, or hopeless 0   3.  Trouble falling or staying asleep, or sleeping too much 0   4.  Feeling tired or having little energy 0   5.  Poor appetite or overeating 0    6.  Feeling bad about yourself 0   7.  Trouble concentrating 0   8.  Moving slowly or restless 0   Q9: Thoughts of better off dead/self-harm past 2 weeks 0   PHQ-9 Total Score 0   Difficulty at work, home, or with people Not difficult at all     JOSÉ LUIS-7  10/26/2022   1. Feeling nervous, anxious, or on edge 0   2. Not being able to stop or control worrying 0   3. Worrying too much about different things 0   4. Trouble relaxing 0   5. Being so restless that it is hard to sit still 0   6. Becoming easily annoyed or irritable 0   7. Feeling afraid, as if something awful might happen 0   JOSÉ LUIS-7 Total Score 0   If you checked any problems, how difficult have they made it for you to do your work, take care of things at home, or get along with other people? Not difficult at all       Suicide Assessment Five-step Evaluation and Treatment (SAFE-T)    Asthma Follow-Up    States her asthma is a bit worse when she was in [place]. She finds sx are worse with exercise; albuterol prn helpful but forgets.   ACT today 18       Was ACT completed today?    Yes    ACT Total Scores 10/26/2022   ACT TOTAL SCORE (Goal Greater than or Equal to 20) 19   In the past 12 months, how many times did you visit the emergency room for your asthma without being admitted to the hospital? 0   In the past 12 months, how many times were you hospitalized overnight because of your asthma? 0         How many days per week do you miss taking your asthma controller medication?  0    Please describe any recent triggers for your asthma: excertion    Have you had any Emergency Room Visits, Urgent Care Visits, or Hospital Admissions since your last office visit?  No      How many servings of fruits and vegetables do you eat daily?  2-3    On average, how many sweetened beverages do you drink each day (Examples: soda, juice, sweet tea, etc.  Do NOT count diet or artificially sweetened beverages)?   0    How many days per week do you exercise enough to make your heart  beat faster? 7    How many minutes a day do you exercise enough to make your heart beat faster? 30 - 60    How many days per week do you miss taking your medication? 0        Review of Systems         Objective    /70 (BP Location: Left arm, Patient Position: Sitting, Cuff Size: Adult Regular)   Pulse 77   Wt 63.5 kg (140 lb)   SpO2 97%   BMI 24.80 kg/m    Body mass index is 24.8 kg/m .  Physical Exam     General: Alert, no acute distress.   HEENT: normocephalic conjunctivae are clear. EOMI  Neck: supple   Lungs: Normal effort  Heart: regular rate  Abdomen: soft   Skin: clear without rash or lesions- there is a small 2cm area of hypopigmented patch on her left shoulder in area above the left forearm itching (no rash)  Neuro: alert, oriented  Psych: mood good, affect appropriate, good eye contact, insight and judgment intact, normal speech pattern.

## 2022-10-28 DIAGNOSIS — F40.243 FEAR OF FLYING: ICD-10-CM

## 2022-10-28 RX ORDER — PROPRANOLOL HYDROCHLORIDE 20 MG/1
TABLET ORAL
Qty: 60 TABLET | Refills: 0 | OUTPATIENT
Start: 2022-10-28

## 2022-10-28 NOTE — TELEPHONE ENCOUNTER
Routing refill request to provider for review/approval because:    Just prescribed 10/08/2022  She uses propranolol for the flight anxiety. She flies out this Friday and plans to use it for that day and the trip home.       SHAYAN Magallon  [facility]

## 2022-11-30 DIAGNOSIS — F32.A DEPRESSION: ICD-10-CM

## 2022-12-01 RX ORDER — DULOXETIN HYDROCHLORIDE 60 MG/1
60 CAPSULE, DELAYED RELEASE ORAL DAILY
Qty: 90 CAPSULE | Refills: 1 | Status: SHIPPED | OUTPATIENT
Start: 2022-12-01 | End: 2023-06-12

## 2022-12-01 NOTE — TELEPHONE ENCOUNTER
"Routing refill request to provider for review/approval because:  Drug interaction warning    Last Written Prescription Date:  5/27/22  Last Fill Quantity: 90,  # refills: 1   Last office visit provider:  10/26/22     Requested Prescriptions   Pending Prescriptions Disp Refills     DULoxetine (CYMBALTA) 60 MG capsule [Pharmacy Med Name: DULOXETINE DR 60MG CAPSULES] 90 capsule 1     Sig: TAKE 1 CAPSULE(60 MG) BY MOUTH DAILY       Serotonin-Norepinephrine Reuptake Inhibitors  Passed - 12/1/2022  7:41 AM        Passed - Blood pressure under 140/90 in past 12 months     BP Readings from Last 3 Encounters:   10/26/22 110/70   04/20/22 128/70   10/14/21 128/70                 Passed - PHQ-9 score of less than 5 in past 6 months     Please review last PHQ-9 score.           Passed - Medication is active on med list        Passed - Patient is age 18 or older        Passed - No active pregnancy on record        Passed - No positive pregnancy test in past 12 months        Passed - Recent (6 mo) or future (30 days) visit within the authorizing provider's specialty     Patient had office visit in the last 6 months or has a visit in the next 30 days with authorizing provider or within the authorizing provider's specialty.  See \"Patient Info\" tab in inbasket, or \"Choose Columns\" in Meds & Orders section of the refill encounter.                 Doyle Nath RN 12/01/22 7:41 AM  "

## 2022-12-05 DIAGNOSIS — N94.819 VULVODYNIA: ICD-10-CM

## 2022-12-05 DIAGNOSIS — F11.90 CHRONIC, CONTINUOUS USE OF OPIOIDS: ICD-10-CM

## 2022-12-05 RX ORDER — TRAMADOL HYDROCHLORIDE 50 MG/1
TABLET ORAL
Qty: 60 TABLET | Refills: 0 | Status: SHIPPED | OUTPATIENT
Start: 2022-12-05 | End: 2023-01-18

## 2022-12-05 NOTE — TELEPHONE ENCOUNTER
Medication Question or Refill    Contacts       Type Contact Phone/Fax    12/05/2022 08:04 AM CST Phone (Incoming) Demetrice Garcia (Self) [phone] (H)          What medication are you calling about (include dose and sig)?: traMADol (ULTRAM) 50 MG tablet    Controlled Substance Agreement on file:   CSA -- Patient Level:     [Media Unavailable] Controlled Substance Agreement - Opioid - Scan on 4/14/2021   [Media Unavailable] Controlled Substance Agreement - Non - Opioid - Scan on 11/27/2019: NON-OPIOID CONTROLLED SUBSTANCE AGREEMENT   [Media Unavailable] Controlled Substance Agreement - Non - Opioid - Scan on 11/25/2020   [Media Unavailable] Controlled Substance Agreement - Opioid - Scan on 11/29/2018       Who prescribed the medication?: Dr. Nam    Do you need a refill? Yes: Leaving to go out on Thursday and needs a refill by Wednesday    When did you use the medication last?     Patient offered an appointment? No    Do you have any questions or concerns?  No    Preferred Pharmacy:   [id number] - [place] - [address] [facility] [address]  [address] [place] [address]  Phone: [phone] Fax: [phone]      Could we send this information to you in [facility] or would you prefer to receive a phone call?:   Patient would prefer a phone call   Okay to leave a detailed message?: Yes at Home number on file [phone] (home)    Trinidad Acosta   [facility]  Central Scheduler

## 2023-01-16 DIAGNOSIS — F11.90 CHRONIC, CONTINUOUS USE OF OPIOIDS: ICD-10-CM

## 2023-01-16 DIAGNOSIS — N94.819 VULVODYNIA: ICD-10-CM

## 2023-01-18 RX ORDER — TRAMADOL HYDROCHLORIDE 50 MG/1
TABLET ORAL
Qty: 60 TABLET | Refills: 0 | Status: SHIPPED | OUTPATIENT
Start: 2023-01-18 | End: 2023-02-15

## 2023-02-15 DIAGNOSIS — F11.90 CHRONIC, CONTINUOUS USE OF OPIOIDS: ICD-10-CM

## 2023-02-15 DIAGNOSIS — N94.819 VULVODYNIA: ICD-10-CM

## 2023-02-15 RX ORDER — TRAMADOL HYDROCHLORIDE 50 MG/1
TABLET ORAL
Qty: 60 TABLET | Refills: 0 | Status: SHIPPED | OUTPATIENT
Start: 2023-02-15 | End: 2023-04-03

## 2023-02-15 NOTE — TELEPHONE ENCOUNTER
Medication Question or Refill    Contacts       Type Contact Phone/Fax    02/15/2023 07:13 AM CST Phone (Incoming) Demetrice Garcia (Self) [phone] (M)          What medication are you calling about (include dose and sig)?: traMADol (ULTRAM) 50 MG tablet    Preferred Pharmacy:   [id number] [address] AT [facility] [address]  [phone]      Controlled Substance Agreement on file:   CSA -- Patient Level:     [Media Unavailable] Controlled Substance Agreement - Opioid - Scan on 4/14/2021   [Media Unavailable] Controlled Substance Agreement - Non - Opioid - Scan on 11/27/2019: NON-OPIOID CONTROLLED SUBSTANCE AGREEMENT   [Media Unavailable] Controlled Substance Agreement - Non - Opioid - Scan on 11/25/2020   [Media Unavailable] Controlled Substance Agreement - Opioid - Scan on 11/29/2018       Who prescribed the medication?: PCP    Do you need a refill? Yes    When did you use the medication last? Did not ask    Patient offered an appointment? No--pt in [place]    Do you have any questions or concerns?  No      Could we send this information to you in [facility] or would you prefer to receive a phone call?:   Patient would prefer a phone call   Okay to leave a detailed message?: Yes at Cell number on file:    Telephone Information:   Mobile [phone]

## 2023-03-22 ENCOUNTER — TRANSFERRED RECORDS (OUTPATIENT)
Dept: HEALTH INFORMATION MANAGEMENT | Facility: CLINIC | Age: 78
End: 2023-03-22

## 2023-04-03 ENCOUNTER — TELEPHONE (OUTPATIENT)
Dept: FAMILY MEDICINE | Facility: CLINIC | Age: 78
End: 2023-04-03
Payer: COMMERCIAL

## 2023-04-03 DIAGNOSIS — N94.819 VULVODYNIA: ICD-10-CM

## 2023-04-03 DIAGNOSIS — F11.90 CHRONIC, CONTINUOUS USE OF OPIOIDS: ICD-10-CM

## 2023-04-03 RX ORDER — TRAMADOL HYDROCHLORIDE 50 MG/1
TABLET ORAL
Qty: 60 TABLET | Refills: 0 | Status: SHIPPED | OUTPATIENT
Start: 2023-04-03 | End: 2023-05-09

## 2023-04-30 DIAGNOSIS — F33.41 RECURRENT MAJOR DEPRESSIVE DISORDER, IN PARTIAL REMISSION (H): ICD-10-CM

## 2023-04-30 NOTE — TELEPHONE ENCOUNTER
"Routing refill request to provider for review/approval because:  Patient needs to be seen because it has been more than 6 months since last office visit.  PHQ-9 is outside of protocol parameters  Patient also has a current prescription for duloxetine    Last Written Prescription Date:  4/20/2022  Last Fill Quantity: 90,  # refills: 3   Last office visit provider:  10/26/2022     Requested Prescriptions   Pending Prescriptions Disp Refills     sertraline (ZOLOFT) 50 MG tablet [Pharmacy Med Name: SERTRALINE 50MG TABLETS] 90 tablet 3     Sig: TAKE 1 TABLET(50 MG) BY MOUTH DAILY       SSRIs Protocol Failed - 4/30/2023  5:59 AM        Failed - PHQ-9 score less than 5 in past 6 months     Please review last PHQ-9 score.           Failed - Recent (6 mo) or future (30 days) visit within the authorizing provider's specialty     Patient had office visit in the last 6 months or has a visit in the next 30 days with authorizing provider or within the authorizing provider's specialty.  See \"Patient Info\" tab in inbasket, or \"Choose Columns\" in Meds & Orders section of the refill encounter.            Passed - Medication is active on med list        Passed - Patient is age 18 or older        Passed - No active pregnancy on record        Passed - No positive pregnancy test in last 12 months             Bernice Desir RN 04/30/23 2:23 PM      "

## 2023-05-08 DIAGNOSIS — N94.819 VULVODYNIA: ICD-10-CM

## 2023-05-08 DIAGNOSIS — F11.90 CHRONIC, CONTINUOUS USE OF OPIOIDS: ICD-10-CM

## 2023-05-08 NOTE — TELEPHONE ENCOUNTER
Medication Question or Refill    Contacts       Type Contact Phone/Fax    05/08/2023 09:13 AM CDT Phone (Incoming) Demetrice Garcia (Self) [phone] (H)      What medication are you calling about (include dose and sig)?:      Preferred Pharmacy:  [id number]  [address] [place] [address]  Phone: [phone] Fax: [phone]    Controlled Substance Agreement on file:   CSA -- Patient Level:     [Media Unavailable] Controlled Substance Agreement - Opioid - Scan on 4/14/2021   [Media Unavailable] Controlled Substance Agreement - Non - Opioid - Scan on 11/27/2019: NON-OPIOID CONTROLLED SUBSTANCE AGREEMENT   [Media Unavailable] Controlled Substance Agreement - Non - Opioid - Scan on 11/25/2020   [Media Unavailable] Controlled Substance Agreement - Opioid - Scan on 11/29/2018       Who prescribed the medication?: Dr. Nam    Do you need a refill? Yes    Could we send this information to you in [facility] or would you prefer to receive a phone call?:   Patient would prefer a phone call     Okay to leave a detailed message?: Yes at Home number on file [phone] (home)     Prophylactic measure

## 2023-05-09 RX ORDER — TRAMADOL HYDROCHLORIDE 50 MG/1
TABLET ORAL
Qty: 60 TABLET | Refills: 0 | Status: SHIPPED | OUTPATIENT
Start: 2023-05-09 | End: 2023-06-12

## 2023-05-25 ENCOUNTER — OFFICE VISIT (OUTPATIENT)
Dept: FAMILY MEDICINE | Facility: CLINIC | Age: 78
End: 2023-05-25
Payer: COMMERCIAL

## 2023-05-25 VITALS
BODY MASS INDEX: 24.8 KG/M2 | HEIGHT: 63 IN | OXYGEN SATURATION: 98 % | HEART RATE: 66 BPM | SYSTOLIC BLOOD PRESSURE: 112 MMHG | DIASTOLIC BLOOD PRESSURE: 72 MMHG | WEIGHT: 140 LBS

## 2023-05-25 DIAGNOSIS — J45.20 MILD INTERMITTENT ASTHMA WITHOUT COMPLICATION: ICD-10-CM

## 2023-05-25 DIAGNOSIS — H25.9 AGE-RELATED CATARACT OF BOTH EYES, UNSPECIFIED AGE-RELATED CATARACT TYPE: ICD-10-CM

## 2023-05-25 DIAGNOSIS — Z01.818 PREOP GENERAL PHYSICAL EXAM: Primary | ICD-10-CM

## 2023-05-25 LAB
ANION GAP SERPL CALCULATED.3IONS-SCNC: 13 MMOL/L (ref 7–15)
BUN SERPL-MCNC: 14.6 MG/DL (ref 8–23)
CALCIUM SERPL-MCNC: 9 MG/DL (ref 8.8–10.2)
CHLORIDE SERPL-SCNC: 101 MMOL/L (ref 98–107)
CREAT SERPL-MCNC: 0.86 MG/DL (ref 0.51–0.95)
DEPRECATED HCO3 PLAS-SCNC: 25 MMOL/L (ref 22–29)
GFR SERPL CREATININE-BSD FRML MDRD: 69 ML/MIN/1.73M2
GLUCOSE SERPL-MCNC: 93 MG/DL (ref 70–99)
POTASSIUM SERPL-SCNC: 4.1 MMOL/L (ref 3.4–5.3)
SODIUM SERPL-SCNC: 139 MMOL/L (ref 136–145)

## 2023-05-25 PROCEDURE — 36415 COLL VENOUS BLD VENIPUNCTURE: CPT | Performed by: PHYSICIAN ASSISTANT

## 2023-05-25 PROCEDURE — 99214 OFFICE O/P EST MOD 30 MIN: CPT | Performed by: PHYSICIAN ASSISTANT

## 2023-05-25 PROCEDURE — 80048 BASIC METABOLIC PNL TOTAL CA: CPT | Performed by: PHYSICIAN ASSISTANT

## 2023-05-25 RX ORDER — FLUTICASONE PROPIONATE 110 UG/1
1 AEROSOL, METERED RESPIRATORY (INHALATION) 2 TIMES DAILY
Qty: 12 G | Refills: 11 | Status: SHIPPED | OUTPATIENT
Start: 2023-05-25 | End: 2024-03-27

## 2023-05-25 RX ORDER — PREDNISOLONE ACETATE 10 MG/ML
SUSPENSION/ DROPS OPHTHALMIC
COMMUNITY
Start: 2023-05-02 | End: 2024-05-14

## 2023-05-25 ASSESSMENT — ASTHMA QUESTIONNAIRES
QUESTION_4 LAST FOUR WEEKS HOW OFTEN HAVE YOU USED YOUR RESCUE INHALER OR NEBULIZER MEDICATION (SUCH AS ALBUTEROL): NOT AT ALL
ACT_TOTALSCORE: 21
ACT_TOTALSCORE: 21
QUESTION_3 LAST FOUR WEEKS HOW OFTEN DID YOUR ASTHMA SYMPTOMS (WHEEZING, COUGHING, SHORTNESS OF BREATH, CHEST TIGHTNESS OR PAIN) WAKE YOU UP AT NIGHT OR EARLIER THAN USUAL IN THE MORNING: NOT AT ALL
QUESTION_2 LAST FOUR WEEKS HOW OFTEN HAVE YOU HAD SHORTNESS OF BREATH: THREE TO SIX TIMES A WEEK
QUESTION_5 LAST FOUR WEEKS HOW WOULD YOU RATE YOUR ASTHMA CONTROL: WELL CONTROLLED
QUESTION_1 LAST FOUR WEEKS HOW MUCH OF THE TIME DID YOUR ASTHMA KEEP YOU FROM GETTING AS MUCH DONE AT WORK, SCHOOL OR AT HOME: A LITTLE OF THE TIME

## 2023-05-25 ASSESSMENT — ANXIETY QUESTIONNAIRES
8. IF YOU CHECKED OFF ANY PROBLEMS, HOW DIFFICULT HAVE THESE MADE IT FOR YOU TO DO YOUR WORK, TAKE CARE OF THINGS AT HOME, OR GET ALONG WITH OTHER PEOPLE?: NOT DIFFICULT AT ALL
6. BECOMING EASILY ANNOYED OR IRRITABLE: NOT AT ALL
IF YOU CHECKED OFF ANY PROBLEMS ON THIS QUESTIONNAIRE, HOW DIFFICULT HAVE THESE PROBLEMS MADE IT FOR YOU TO DO YOUR WORK, TAKE CARE OF THINGS AT HOME, OR GET ALONG WITH OTHER PEOPLE: NOT DIFFICULT AT ALL
2. NOT BEING ABLE TO STOP OR CONTROL WORRYING: NOT AT ALL
7. FEELING AFRAID AS IF SOMETHING AWFUL MIGHT HAPPEN: NOT AT ALL
7. FEELING AFRAID AS IF SOMETHING AWFUL MIGHT HAPPEN: NOT AT ALL
GAD7 TOTAL SCORE: 0
GAD7 TOTAL SCORE: 0
1. FEELING NERVOUS, ANXIOUS, OR ON EDGE: NOT AT ALL
3. WORRYING TOO MUCH ABOUT DIFFERENT THINGS: NOT AT ALL
4. TROUBLE RELAXING: NOT AT ALL
GAD7 TOTAL SCORE: 0
5. BEING SO RESTLESS THAT IT IS HARD TO SIT STILL: NOT AT ALL

## 2023-05-25 ASSESSMENT — PATIENT HEALTH QUESTIONNAIRE - PHQ9
SUM OF ALL RESPONSES TO PHQ QUESTIONS 1-9: 1
SUM OF ALL RESPONSES TO PHQ QUESTIONS 1-9: 1
10. IF YOU CHECKED OFF ANY PROBLEMS, HOW DIFFICULT HAVE THESE PROBLEMS MADE IT FOR YOU TO DO YOUR WORK, TAKE CARE OF THINGS AT HOME, OR GET ALONG WITH OTHER PEOPLE: NOT DIFFICULT AT ALL

## 2023-05-25 NOTE — PATIENT INSTRUCTIONS
For informational purposes only. Not to replace the advice of your health care provider. Copyright   2003,  [facility]. All rights reserved. Clinically reviewed by Trena Smith MD. [employer] [id number] - REV .  Preparing for Your Surgery  Getting started  A nurse will call you to review your health history and instructions. They will give you an arrival time based on your scheduled surgery time. Please be ready to share:  Your doctor's clinic name and phone number  Your medical, surgical, and anesthesia history  A list of allergies and sensitivities  A list of medicines, including herbal treatments and over-the-counter drugs  Whether the patient has a legal guardian (ask how to send us the papers in advance)  Please tell us if you're pregnant--or if there's any chance you might be pregnant. Some surgeries may injure a fetus (unborn baby), so they require a pregnancy test. Surgeries that are safe for a fetus don't always need a test, and you can choose whether to have one.   If you have a child who's having surgery, please ask for a copy of Preparing for Your Child's Surgery.    Preparing for surgery  Within 10 to 30 days of surgery: Have a pre-op exam (sometimes called an H&P, or History and Physical). This can be done at a clinic or pre-operative center.  If you're having a , you may not need this exam. Talk to your care team.  At your pre-op exam, talk to your care team about all medicines you take. If you need to stop any medicines before surgery, ask when to start taking them again.  We do this for your safety. Many medicines can make you bleed too much during surgery. Some change how well surgery (anesthesia) drugs work.  Call your insurance company to let them know you're having surgery. (If you don't have insurance, call [phone].)  Call your clinic if there's any change in your health. This includes signs of a cold or flu (sore throat, runny nose, cough, rash, fever). It  also includes a scrape or scratch near the surgery site.  If you have questions on the day of surgery, call your hospital or surgery center.  Eating and drinking guidelines  For your safety: Unless your surgeon tells you otherwise, follow the guidelines below.  Eat and drink as usual until 8 hours before you arrive for surgery. After that, no food or milk.  Drink clear liquids until 2 hours before you arrive. These are liquids you can see through, like water, Gatorade, and Propel Water. They also include plain black coffee and tea (no cream or milk), candy, and breath mints. You can spit out gum when you arrive.  If you drink alcohol: Stop drinking it the night before surgery.  If your care team tells you to take medicine on the morning of surgery, it's okay to take it with a sip of water.  Preventing infection  Shower or bathe the night before and morning of your surgery. Follow the instructions your clinic gave you. (If no instructions, use regular soap.)  Don't shave or clip hair near your surgery site. We'll remove the hair if needed.  Don't smoke or vape the morning of surgery. You may chew nicotine gum up to 2 hours before surgery. A nicotine patch is okay.  Note: Some surgeries require you to completely quit smoking and nicotine. Check with your surgeon.  Your care team will make every effort to keep you safe from infection. We will:  Clean our hands often with soap and water (or an alcohol-based hand rub).  Clean the skin at your surgery site with a special soap that kills germs.  Give you a special gown to keep you warm. (Cold raises the risk of infection.)  Wear special hair covers, masks, gowns and gloves during surgery.  Give antibiotic medicine, if prescribed. Not all surgeries need antibiotics.  What to bring on the day of surgery  Photo ID and insurance card  Copy of your health care directive, if you have one  Glasses and hearing aids (bring cases)  You can't wear contacts during surgery  Inhaler and  eye drops, if you use them (tell us about these when you arrive)  CPAP machine or breathing device, if you use them  A few personal items, if spending the night  If you have . . .  A pacemaker, ICD (cardiac defibrillator) or other implant: Bring the ID card.  An implanted stimulator: Bring the remote control.  A legal guardian: Bring a copy of the certified (court-stamped) guardianship papers.  Please remove any jewelry, including body piercings. Leave jewelry and other valuables at home.  If you're going home the day of surgery  You must have a responsible adult drive you home. They should stay with you overnight as well.  If you don't have someone to stay with you, and you aren't safe to go home alone, we may keep you overnight. Insurance often won't pay for this.  After surgery  If it's hard to control your pain or you need more pain medicine, please call your surgeon's office.  Questions?   If you have any questions for your care team, list them here: _________________________________________________________________________________________________________________________________________________________________________ ____________________________________ ____________________________________ ____________________________________    How to Take Your Medication Before Surgery  - Take all of your medications before surgery as usual

## 2023-05-25 NOTE — PROGRESS NOTES
[facility]  [address] [facility] [address]  Phone: [phone]  Fax: [phone]  Primary Provider: Gogo Nam  Pre-op Performing Provider: FERDINAND BRITT      PREOPERATIVE EVALUATION:  Today's date: 5/25/2023    Demetrice Garcia is a 77 year old female who presents for a preoperative evaluation.      5/25/2023    10:23 AM   Additional Questions   Roomed by laverne   Accompanied by self     Surgical Information:  Surgery/Procedure: cataract  Surgery Location: [facility]  Surgeon: Dr Shelby  Surgery Date: right eye June 01/23 and left June 22/23  Time of Surgery: TBD  Where patient plans to recover: At home with family  Fax number for surgical facility: [phone]    Assessment & Plan     The proposed surgical procedure is considered LOW risk.    Preop general physical exam    - Basic metabolic panel  (Ca, Cl, CO2, Creat, Gluc, K, Na, BUN)    Mild intermittent asthma without complication  Well controlled  - fluticasone (FLOVENT HFA) 110 MCG/ACT inhaler  Dispense: 12 g; Refill: 11    Age-related cataract of both eyes, unspecified age-related cataract type    - Basic metabolic panel  (Ca, Cl, CO2, Creat, Gluc, K, Na, BUN)              - No identified additional risk factors other than previously addressed    Antiplatelet or Anticoagulation Medication Instructions:   - Patient is on no antiplatelet or anticoagulation medications.    Additional Medication Instructions:  Patient is to take all scheduled medications on the day of surgery    RECOMMENDATION:  APPROVAL GIVEN to proceed with proposed procedure, without further diagnostic evaluation.    [id number]}    Subjective       HPI related to upcoming procedure: bilateral cataracts, will have right eye corrected followed by the left eye.         5/25/2023    10:01 AM   Preop Questions   1. Have you ever had a heart attack or stroke? No   2. Have you ever had surgery on your heart or blood vessels, such as a  stent placement, a coronary artery bypass, or surgery on an artery in your head, neck, heart, or legs? No   3. Do you have chest pain with activity? No   4. Do you have a history of  heart failure? No   5. Do you currently have a cold, bronchitis or symptoms of other infection? No   6. Do you have a cough, shortness of breath, or wheezing? YES allergy related   7. Do you or anyone in your family have previous history of blood clots? No   8. Do you or does anyone in your family have a serious bleeding problem such as prolonged bleeding following surgeries or cuts? No   9. Have you ever had problems with anemia or been told to take iron pills? No   10. Have you had any abnormal blood loss such as black, tarry or bloody stools, or abnormal vaginal bleeding? No   11. Have you ever had a blood transfusion? No   12. Are you willing to have a blood transfusion if it is medically needed before, during, or after your surgery? Yes   13. Have you or any of your relatives ever had problems with anesthesia? No   14. Do you have sleep apnea, excessive snoring or daytime drowsiness? No   15. Do you have any artifical heart valves or other implanted medical devices like a pacemaker, defibrillator, or continuous glucose monitor? No   16. Do you have artificial joints? No   17. Are you allergic to latex? No       Health Care Directive:  Patient does not have a Health Care Directive or Living Will: Patient states has Advance Directive and will bring in a copy to clinic.    Preoperative Review of :   reviewed - controlled substances reflected in medication list.      Status of Chronic Conditions:  ASTHMA - Patient has a longstanding history of moderate-severe Asthma . Patient has been doing well overall noting NO SYMPTOMS and continues on medication regimen consisting of flovent and albuterol prn without adverse reactions or side effects.       Review of Systems  CONSTITUTIONAL: NEGATIVE for fever, chills, change in  weight  INTEGUMENTARY/SKIN: NEGATIVE for worrisome rashes, moles or lesions  EYES: NEGATIVE for vision changes or irritation  ENT/MOUTH: NEGATIVE for ear, mouth and throat problems  RESP: NEGATIVE for significant cough or SOB  CV: NEGATIVE for chest pain, palpitations or peripheral edema  GI: NEGATIVE for nausea, abdominal pain, heartburn, or change in bowel habits  : NEGATIVE for frequency, dysuria, or hematuria  MUSCULOSKELETAL: NEGATIVE for significant arthralgias or myalgia  NEURO: NEGATIVE for weakness, dizziness or paresthesias  ENDOCRINE: NEGATIVE for temperature intolerance, skin/hair changes  HEME: NEGATIVE for bleeding problems  PSYCHIATRIC: NEGATIVE for changes in mood or affect    Patient Active Problem List    Diagnosis Date Noted     Controlled substance agreement signed 11/25/19- Tramadol 50mg tabs- takes 100mg daily w/ PRN during travel 50mg q6 hr as needed, MELECIO Duval, f/u q6 months 11/25/2019     Priority: Medium     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  CSA in chart 11/26/2018 for 50 mg tablet: take 100mg daily, #60/month with   every 4 month OV follow-up plan.  Goog Nam MD         Lichen Sclerosus Et Atrophicus      Priority: Medium     Involving vaginal area , currently not doing any local treatment ,   followed at [facility]          Recurrent major depressive disorder, in full remission (H) 11/27/2016     Priority: Medium     Insomnia      Priority: Medium     Created by Conversion          Past Medical History:   Diagnosis Date     Collagenous colitis 9/4/2018     Solitary pulmonary nodule      CT from 11/2015: 1.3 x 0.9 cm  left lower lobe pulmonary nodule, stable to minimally decreased when compared to 6/24/2014. Recommend one additional follow-up to document 2 years of stability. Pt declines repeat CT as of 11/2018.     Past Surgical History:   Procedure Laterality Date     ARTHROSCOPY SHOULDER ROTATOR CUFF REPAIR       BIOPSY BREAST Right 2012 ?     Current Outpatient Medications   Medication Sig Dispense Refill     albuterol (PROAIR HFA;PROVENTIL HFA;VENTOLIN HFA) 90 mcg/actuation inhaler [ALBUTEROL (PROAIR HFA;PROVENTIL HFA;VENTOLIN HFA) 90 MCG/ACTUATION INHALER] Inhale 2 puffs every 6 (six) hours as needed for wheezing. 1 each 0     calcium carbonate (OS-TRINY) 600 mg calcium (1,500 mg) tablet [CALCIUM CARBONATE (OS-TRINY) 600 MG CALCIUM (1,500 MG) TABLET] Take 1 tablet (600 mg total) by mouth daily. 100 tablet 2     cetirizine (ZYRTEC) 10 MG tablet Take 20 mg by mouth daily       DULoxetine (CYMBALTA) 60 MG capsule Take 1 capsule (60 mg) by mouth daily 90 capsule 1     fluticasone (FLOVENT HFA) 110 MCG/ACT inhaler Inhale 1 puff into the lungs 2 times daily 12 g 11     Melatonin 10 MG TABS tablet Take 10 mg by mouth nightly as needed for sleep       prednisoLONE acetate (PRED FORTE) 1 % ophthalmic suspension        traMADol (ULTRAM) 50 MG tablet [TRAMADOL (ULTRAM) 50 MG TABLET] TAKE 2 TABLETS BY MOUTH EVERY DAY. MAY TAKE UP TO 6 TABLET ON TRAVEL DAYS 2 TABLET EVERY 8 HOURS 60 tablet 0       Allergies   Allergen Reactions     Iodine Nausea     Shellfish Containing Products [Shellfish-Derived Products] Unknown     Stomach pain     Naproxen Rash     Tolerates Ibuprofen        Social History     Tobacco Use     Smoking status: Former     Smokeless tobacco: Never   Vaping Use     Vaping status: Never Used   Substance Use Topics     Alcohol use: No     Family History   Problem Relation Age of Onset     Pancreatic Cancer Father 86.00     History   Drug Use No         Objective     /72 (BP Location: Left arm, Patient Position: Sitting,  "Cuff Size: Adult Regular)   Pulse 66   Ht 1.6 m (5' 3\")   Wt 63.5 kg (140 lb)   SpO2 98%   BMI 24.80 kg/m      Physical Exam    GENERAL APPEARANCE: healthy, alert and no distress     EYES: EOMI, PERRL     HENT: ear canals and TM's normal and nose and mouth without ulcers or lesions     NECK: no adenopathy, no asymmetry, masses, or scars and thyroid normal to palpation     RESP: lungs clear to auscultation - no rales, rhonchi or wheezes     CV: regular rates and rhythm, normal S1 S2, no S3 or S4 and no murmur, click or rub     ABDOMEN:  soft, nontender, no HSM or masses and bowel sounds normal     MS: extremities normal- no gross deformities noted, no evidence of inflammation in joints, FROM in all extremities.     SKIN: no suspicious lesions or rashes     NEURO: Normal strength and tone, sensory exam grossly normal, mentation intact and speech normal     PSYCH: mentation appears normal. and affect normal/bright     LYMPHATICS: No cervical adenopathy    Recent Labs   Lab Test 10/26/22  0936 04/20/22  0941 10/14/21  1000 10/14/21  1000 10/14/21  0959   HGB 14.6 14.5   < > 14.0  --      --   --  246  --      --   --   --  141   POTASSIUM 4.3  --   --   --  4.5   CR 0.96*  --   --   --  0.77    < > = values in this interval not displayed.        Diagnostics:  Recent Results (from the past 24 hour(s))   Basic metabolic panel  (Ca, Cl, CO2, Creat, Gluc, K, Na, BUN)    Collection Time: 05/25/23 11:01 AM   Result Value Ref Range    Sodium 139 136 - 145 mmol/L    Potassium 4.1 3.4 - 5.3 mmol/L    Chloride 101 98 - 107 mmol/L    Carbon Dioxide (CO2) 25 22 - 29 mmol/L    Anion Gap 13 7 - 15 mmol/L    Urea Nitrogen 14.6 8.0 - 23.0 mg/dL    Creatinine 0.86 0.51 - 0.95 mg/dL    Calcium 9.0 8.8 - 10.2 mg/dL    Glucose 93 70 - 99 mg/dL    GFR Estimate 69 >60 mL/min/1.73m2      No EKG required for low risk surgery (cataract, skin procedure, breast biopsy, etc).    Revised Cardiac Risk Index (RCRI):  The patient has " the following serious cardiovascular risks for perioperative complications:   - No serious cardiac risks = 0 points     RCRI Interpretation: 0 points: Class I (very low risk - 0.4% complication rate)           Signed Electronically by: Elif Fisher PA-C  Copy of this evaluation report is provided to requesting physician.      Answers for HPI/ROS submitted by the patient on 5/25/2023  If you checked off any problems, how difficult have these problems made it for you to do your work, take care of things at home, or get along with other people?: Not difficult at all  PHQ9 TOTAL SCORE: 1  JOSÉ LUIS 7 TOTAL SCORE: 0

## 2023-06-02 ENCOUNTER — HEALTH MAINTENANCE LETTER (OUTPATIENT)
Age: 78
End: 2023-06-02

## 2023-06-12 DIAGNOSIS — N94.819 VULVODYNIA: ICD-10-CM

## 2023-06-12 DIAGNOSIS — F11.90 CHRONIC, CONTINUOUS USE OF OPIOIDS: ICD-10-CM

## 2023-06-12 DIAGNOSIS — F32.A DEPRESSION: ICD-10-CM

## 2023-06-12 RX ORDER — DULOXETIN HYDROCHLORIDE 60 MG/1
60 CAPSULE, DELAYED RELEASE ORAL DAILY
Qty: 90 CAPSULE | Refills: 1 | Status: SHIPPED | OUTPATIENT
Start: 2023-06-12 | End: 2023-12-15

## 2023-06-12 RX ORDER — TRAMADOL HYDROCHLORIDE 50 MG/1
TABLET ORAL
Qty: 60 TABLET | Refills: 0 | Status: SHIPPED | OUTPATIENT
Start: 2023-06-12 | End: 2023-07-19

## 2023-06-12 NOTE — TELEPHONE ENCOUNTER
Last seen 10/26/22 by Viv.  Wanted to follow up in 3 months no follow up appointment scheduled.  LM for Demetrice to call back to schedule a follow up visit.  Please advise on refill of the traMaDol and the DULoxetine. JOSE BAGLEY on 6/12/2023 at 11:08 AM

## 2023-06-12 NOTE — TELEPHONE ENCOUNTER
Agree with goal to schedule 3-6 month follow up- due for AWV    [place] PDMP reviewed, no concerns  Last dispensed tramadol 5/10/2023

## 2023-06-12 NOTE — TELEPHONE ENCOUNTER
Medication Question or Refill        What medication are you calling about (include dose and sig)?: traMADol (ULTRAM) 50 MG tablet  DULoxetine (CYMBALTA) 60 MG capsule    Preferred Pharmacy:   [id number] - [place], [place] - [address] AT [facility] [address]  [address] [place] [address]  Phone: [phone] Fax: [phone]        Controlled Substance Agreement on file:   CSA -- Patient Level:     [Media Unavailable] Controlled Substance Agreement - Opioid - Scan on 4/14/2021   [Media Unavailable] Controlled Substance Agreement - Non - Opioid - Scan on 11/27/2019: NON-OPIOID CONTROLLED SUBSTANCE AGREEMENT   [Media Unavailable] Controlled Substance Agreement - Non - Opioid - Scan on 11/25/2020   [Media Unavailable] Controlled Substance Agreement - Opioid - Scan on 11/29/2018       Who prescribed the medication?: PCP    Do you need a refill? Yes    When did you use the medication last? na    Patient offered an appointment? No    Do you have any questions or concerns?  No      Could we send this information to you in [facility] or would you prefer to receive a phone call?:   Patient would prefer a phone call   Okay to leave a detailed message?: Yes at Home number on file [phone] (home)

## 2023-07-18 DIAGNOSIS — N94.819 VULVODYNIA: ICD-10-CM

## 2023-07-18 DIAGNOSIS — F11.90 CHRONIC, CONTINUOUS USE OF OPIOIDS: ICD-10-CM

## 2023-07-19 RX ORDER — TRAMADOL HYDROCHLORIDE 50 MG/1
TABLET ORAL
Qty: 60 TABLET | Refills: 0 | Status: SHIPPED | OUTPATIENT
Start: 2023-07-19 | End: 2023-08-22

## 2023-07-19 NOTE — TELEPHONE ENCOUNTER
Call received from pharmacy, requesting clarification on tramadol prescription in regards to travel days. No other questions.

## 2023-08-22 DIAGNOSIS — F11.90 CHRONIC, CONTINUOUS USE OF OPIOIDS: ICD-10-CM

## 2023-08-22 DIAGNOSIS — N94.819 VULVODYNIA: ICD-10-CM

## 2023-08-22 RX ORDER — TRAMADOL HYDROCHLORIDE 50 MG/1
TABLET ORAL
Qty: 60 TABLET | Refills: 0 | Status: SHIPPED | OUTPATIENT
Start: 2023-08-22 | End: 2023-09-26

## 2023-08-22 NOTE — TELEPHONE ENCOUNTER
Medication Question or Refill  What medication are you calling about (include dose and sig)?:       Preferred Pharmacy:  [facility][id number] - [place], [place] - [address] [facility] [address]  [address] [place] [address]  Phone: [phone] Fax: [phone]    Controlled Substance Agreement on file:   CSA -- Patient Level:     [Media Unavailable] Controlled Substance Agreement - Opioid - Scan on 4/14/2021   [Media Unavailable] Controlled Substance Agreement - Non - Opioid - Scan on 11/27/2019: NON-OPIOID CONTROLLED SUBSTANCE AGREEMENT   [Media Unavailable] Controlled Substance Agreement - Non - Opioid - Scan on 11/25/2020   [Media Unavailable] Controlled Substance Agreement - Opioid - Scan on 11/29/2018       Who prescribed the medication?: PCP    Do you need a refill? Yes    Do you have any questions or concerns?  No      Could we send this information to you in [facility] or would you prefer to receive a phone call?:   Patient would prefer a phone call     Okay to leave a detailed message?: Yes at Home number on file [phone] (home)

## 2023-08-24 ENCOUNTER — TRANSFERRED RECORDS (OUTPATIENT)
Dept: HEALTH INFORMATION MANAGEMENT | Facility: CLINIC | Age: 78
End: 2023-08-24
Payer: COMMERCIAL

## 2023-09-26 DIAGNOSIS — F11.90 CHRONIC, CONTINUOUS USE OF OPIOIDS: ICD-10-CM

## 2023-09-26 DIAGNOSIS — N94.819 VULVODYNIA: ICD-10-CM

## 2023-09-26 RX ORDER — TRAMADOL HYDROCHLORIDE 50 MG/1
TABLET ORAL
Qty: 60 TABLET | Refills: 0 | Status: SHIPPED | OUTPATIENT
Start: 2023-09-26 | End: 2023-10-23

## 2023-09-26 NOTE — TELEPHONE ENCOUNTER
Medication Question or Refill  What medication are you calling about (include dose and sig)?:       Preferred Pharmacy:  [facility] #[id number] - [place], [place] - [address] [facility] [address]  [address] [place] [address]  Phone: [phone] Fax: [phone]    Controlled Substance Agreement on file:   CSA -- Patient Level:     [Media Unavailable] Controlled Substance Agreement - Opioid - Scan on 4/14/2021   [Media Unavailable] Controlled Substance Agreement - Non - Opioid - Scan on 11/27/2019: NON-OPIOID CONTROLLED SUBSTANCE AGREEMENT   [Media Unavailable] Controlled Substance Agreement - Non - Opioid - Scan on 11/25/2020   [Media Unavailable] Controlled Substance Agreement - Opioid - Scan on 11/29/2018       Who prescribed the medication?: PCP    Do you need a refill? Yes    Could we send this information to you in [facility] or would you prefer to receive a phone call?:   Patient would prefer a phone call   Okay to leave a detailed message?: Yes at Cell number on file:    Telephone Information:   [email or web address] [phone]

## 2023-10-22 DIAGNOSIS — N94.819 VULVODYNIA: ICD-10-CM

## 2023-10-22 DIAGNOSIS — F11.90 CHRONIC, CONTINUOUS USE OF OPIOIDS: ICD-10-CM

## 2023-10-23 RX ORDER — TRAMADOL HYDROCHLORIDE 50 MG/1
TABLET ORAL
Qty: 60 TABLET | Refills: 0 | Status: SHIPPED | OUTPATIENT
Start: 2023-10-23 | End: 2023-12-05

## 2023-10-23 NOTE — TELEPHONE ENCOUNTER
10/23/23  Pt called to check status. Relayed to pt that we are waiting for Dr. Nam to send in.  Sherlyn

## 2023-12-05 DIAGNOSIS — N94.819 VULVODYNIA: ICD-10-CM

## 2023-12-05 DIAGNOSIS — F11.90 CHRONIC, CONTINUOUS USE OF OPIOIDS: ICD-10-CM

## 2023-12-05 RX ORDER — TRAMADOL HYDROCHLORIDE 50 MG/1
TABLET ORAL
Qty: 60 TABLET | Refills: 0 | Status: SHIPPED | OUTPATIENT
Start: 2023-12-05 | End: 2024-01-04

## 2023-12-15 DIAGNOSIS — F32.A DEPRESSION: ICD-10-CM

## 2023-12-15 RX ORDER — DULOXETIN HYDROCHLORIDE 60 MG/1
60 CAPSULE, DELAYED RELEASE ORAL DAILY
Qty: 30 CAPSULE | Refills: 0 | Status: SHIPPED | OUTPATIENT
Start: 2023-12-15 | End: 2024-01-04

## 2024-01-04 DIAGNOSIS — F11.90 CHRONIC, CONTINUOUS USE OF OPIOIDS: ICD-10-CM

## 2024-01-04 DIAGNOSIS — F32.A DEPRESSION: ICD-10-CM

## 2024-01-04 DIAGNOSIS — N94.819 VULVODYNIA: ICD-10-CM

## 2024-01-04 RX ORDER — DULOXETIN HYDROCHLORIDE 60 MG/1
60 CAPSULE, DELAYED RELEASE ORAL DAILY
Qty: 90 CAPSULE | Refills: 1 | Status: SHIPPED | OUTPATIENT
Start: 2024-01-04 | End: 2024-05-23

## 2024-01-04 RX ORDER — TRAMADOL HYDROCHLORIDE 50 MG/1
TABLET ORAL
Qty: 60 TABLET | Refills: 0 | Status: SHIPPED | OUTPATIENT
Start: 2024-01-04 | End: 2024-02-14

## 2024-02-14 DIAGNOSIS — F11.90 CHRONIC, CONTINUOUS USE OF OPIOIDS: ICD-10-CM

## 2024-02-14 DIAGNOSIS — N94.819 VULVODYNIA: ICD-10-CM

## 2024-02-14 RX ORDER — TRAMADOL HYDROCHLORIDE 50 MG/1
TABLET ORAL
Qty: 60 TABLET | Refills: 0 | Status: SHIPPED | OUTPATIENT
Start: 2024-02-14 | End: 2024-03-19

## 2024-02-14 NOTE — TELEPHONE ENCOUNTER
Medication Question or Refill    Contacts         Type Contact Phone/Fax    02/14/2024 08:05 AM CST Phone (Incoming) Demetrice Garcia (Self) [phone] (M)            What medication are you calling about (include dose and sig)?: traMADol (ULTRAM) 50 MG tablet     Preferred Pharmacy:   [facility] #[id number] [facility], [place] - [address] [place] [address] AT [facility] &  [address]  [address] [facility] [address]  Phone: [phone] Fax: [phone]        Controlled Substance Agreement on file:   CSA -- Patient Level:     [Media Unavailable] Controlled Substance Agreement - Opioid - Scan on 4/14/2021   [Media Unavailable] Controlled Substance Agreement - Non - Opioid - Scan on 11/27/2019: NON-OPIOID CONTROLLED SUBSTANCE AGREEMENT   [Media Unavailable] Controlled Substance Agreement - Non - Opioid - Scan on 11/25/2020   [Media Unavailable] Controlled Substance Agreement - Opioid - Scan on 11/29/2018       Who prescribed the medication?: Dr. Nam    Do you need a refill? Yes    Do you have any questions or concerns?  No      Could we send this information to you in [facility] or would you prefer to receive a phone call?:   No preference   Okay to leave a detailed message?: Yes at Cell number on file:    Telephone Information:   Mobile [phone]   Mobile [phone]

## 2024-03-19 ENCOUNTER — TELEPHONE (OUTPATIENT)
Dept: FAMILY MEDICINE | Facility: CLINIC | Age: 79
End: 2024-03-19
Payer: COMMERCIAL

## 2024-03-19 DIAGNOSIS — N94.819 VULVODYNIA: ICD-10-CM

## 2024-03-19 DIAGNOSIS — F11.90 CHRONIC, CONTINUOUS USE OF OPIOIDS: ICD-10-CM

## 2024-03-19 RX ORDER — TRAMADOL HYDROCHLORIDE 50 MG/1
TABLET ORAL
Qty: 60 TABLET | Refills: 0 | Status: SHIPPED | OUTPATIENT
Start: 2024-03-19 | End: 2024-04-25

## 2024-03-19 NOTE — TELEPHONE ENCOUNTER
Medication Question or Refill    Contacts         Type Contact Phone/Fax    03/19/2024 07:40 AM CDT Phone (Incoming) Demetrice Garcia (Self) [phone] (M)            What medication are you calling about (include dose and sig)?: Tramadol 50 mg    Preferred Pharmacy:   [facility] #[id number] - [place], [place] - [address] AT [facility] [address]  [address]  [place] [address]  Phone: [phone] Fax: [phone]          Controlled Substance Agreement on file:   CSA -- Patient Level:     [Media Unavailable] Controlled Substance Agreement - Opioid - Scan on 4/14/2021   [Media Unavailable] Controlled Substance Agreement - Non - Opioid - Scan on 11/27/2019: NON-OPIOID CONTROLLED SUBSTANCE AGREEMENT   [Media Unavailable] Controlled Substance Agreement - Non - Opioid - Scan on 11/25/2020   [Media Unavailable] Controlled Substance Agreement - Opioid - Scan on 11/29/2018       Who prescribed the medication?: Viv    Do you need a refill? Yes    When did you use the medication last? today    Patient offered an appointment? No    Do you have any questions or concerns?  No      Could we send this information to you in [facility] or would you prefer to receive a phone call?:   Patient would prefer a phone call   Okay to leave a detailed message?: Yes at Home number on file [phone] (home)

## 2024-03-27 ENCOUNTER — OFFICE VISIT (OUTPATIENT)
Dept: FAMILY MEDICINE | Facility: CLINIC | Age: 79
End: 2024-03-27
Payer: COMMERCIAL

## 2024-03-27 VITALS
RESPIRATION RATE: 18 BRPM | BODY MASS INDEX: 23.93 KG/M2 | WEIGHT: 140.19 LBS | OXYGEN SATURATION: 97 % | SYSTOLIC BLOOD PRESSURE: 100 MMHG | DIASTOLIC BLOOD PRESSURE: 62 MMHG | HEIGHT: 64 IN | HEART RATE: 60 BPM

## 2024-03-27 DIAGNOSIS — F33.41 RECURRENT MAJOR DEPRESSIVE DISORDER, IN PARTIAL REMISSION (H): ICD-10-CM

## 2024-03-27 DIAGNOSIS — N94.819 VULVODYNIA: ICD-10-CM

## 2024-03-27 DIAGNOSIS — Z00.00 MEDICARE ANNUAL WELLNESS VISIT, SUBSEQUENT: Primary | ICD-10-CM

## 2024-03-27 DIAGNOSIS — J45.20 MILD INTERMITTENT ASTHMA WITHOUT COMPLICATION: ICD-10-CM

## 2024-03-27 DIAGNOSIS — F11.90 CHRONIC, CONTINUOUS USE OF OPIOIDS: ICD-10-CM

## 2024-03-27 LAB
AMPHETAMINES UR QL SCN: NORMAL
BARBITURATES UR QL SCN: NORMAL
BENZODIAZ UR QL SCN: NORMAL
BZE UR QL SCN: NORMAL
CANNABINOIDS UR QL SCN: NORMAL
CHOLEST SERPL-MCNC: 204 MG/DL
CREAT UR-MCNC: 120 MG/DL
FASTING STATUS PATIENT QL REPORTED: ABNORMAL
FENTANYL UR QL: NORMAL
HBA1C MFR BLD: 5.2 % (ref 0–5.6)
HDLC SERPL-MCNC: 66 MG/DL
HGB BLD-MCNC: 14.4 G/DL (ref 11.7–15.7)
LDLC SERPL CALC-MCNC: 123 MG/DL
Lab: NORMAL
NONHDLC SERPL-MCNC: 138 MG/DL
OPIATES UR QL SCN: NORMAL
PCP QUAL URINE (ROCHE): NORMAL
PERFORMING LABORATORY: NORMAL
SPECIMEN STATUS: NORMAL
TEST NAME: NORMAL
TRIGL SERPL-MCNC: 76 MG/DL

## 2024-03-27 PROCEDURE — 99397 PER PM REEVAL EST PAT 65+ YR: CPT | Performed by: FAMILY MEDICINE

## 2024-03-27 PROCEDURE — 80061 LIPID PANEL: CPT | Performed by: FAMILY MEDICINE

## 2024-03-27 PROCEDURE — 99214 OFFICE O/P EST MOD 30 MIN: CPT | Mod: 25 | Performed by: FAMILY MEDICINE

## 2024-03-27 PROCEDURE — 83036 HEMOGLOBIN GLYCOSYLATED A1C: CPT | Performed by: FAMILY MEDICINE

## 2024-03-27 PROCEDURE — G0480 DRUG TEST DEF 1-7 CLASSES: HCPCS | Mod: 59 | Performed by: FAMILY MEDICINE

## 2024-03-27 PROCEDURE — 36415 COLL VENOUS BLD VENIPUNCTURE: CPT | Performed by: FAMILY MEDICINE

## 2024-03-27 PROCEDURE — 85018 HEMOGLOBIN: CPT | Performed by: FAMILY MEDICINE

## 2024-03-27 PROCEDURE — 82570 ASSAY OF URINE CREATININE: CPT | Mod: 59 | Performed by: FAMILY MEDICINE

## 2024-03-27 PROCEDURE — 80307 DRUG TEST PRSMV CHEM ANLYZR: CPT | Performed by: FAMILY MEDICINE

## 2024-03-27 RX ORDER — FLUTICASONE PROPIONATE 110 UG/1
1 AEROSOL, METERED RESPIRATORY (INHALATION) 2 TIMES DAILY
Qty: 12 G | Refills: 11 | Status: SHIPPED | OUTPATIENT
Start: 2024-03-27 | End: 2024-05-14

## 2024-03-27 RX ORDER — TRIAMCINOLONE ACETONIDE 1 MG/G
CREAM TOPICAL
COMMUNITY
Start: 2023-08-24 | End: 2024-05-14

## 2024-03-27 RX ORDER — ALBUTEROL SULFATE 90 UG/1
2 AEROSOL, METERED RESPIRATORY (INHALATION) EVERY 6 HOURS PRN
Qty: 18 G | Refills: 0 | Status: SHIPPED | OUTPATIENT
Start: 2024-03-27 | End: 2024-05-08

## 2024-03-27 SDOH — HEALTH STABILITY: PHYSICAL HEALTH: ON AVERAGE, HOW MANY DAYS PER WEEK DO YOU ENGAGE IN MODERATE TO STRENUOUS EXERCISE (LIKE A BRISK WALK)?: 4 DAYS

## 2024-03-27 SDOH — HEALTH STABILITY: PHYSICAL HEALTH: ON AVERAGE, HOW MANY MINUTES DO YOU ENGAGE IN EXERCISE AT THIS LEVEL?: 60 MIN

## 2024-03-27 ASSESSMENT — ASTHMA QUESTIONNAIRES
QUESTION_5 LAST FOUR WEEKS HOW WOULD YOU RATE YOUR ASTHMA CONTROL: SOMEWHAT CONTROLLED
QUESTION_3 LAST FOUR WEEKS HOW OFTEN DID YOUR ASTHMA SYMPTOMS (WHEEZING, COUGHING, SHORTNESS OF BREATH, CHEST TIGHTNESS OR PAIN) WAKE YOU UP AT NIGHT OR EARLIER THAN USUAL IN THE MORNING: NOT AT ALL
QUESTION_4 LAST FOUR WEEKS HOW OFTEN HAVE YOU USED YOUR RESCUE INHALER OR NEBULIZER MEDICATION (SUCH AS ALBUTEROL): NOT AT ALL
ACT_TOTALSCORE: 20
QUESTION_1 LAST FOUR WEEKS HOW MUCH OF THE TIME DID YOUR ASTHMA KEEP YOU FROM GETTING AS MUCH DONE AT WORK, SCHOOL OR AT HOME: NONE OF THE TIME
QUESTION_2 LAST FOUR WEEKS HOW OFTEN HAVE YOU HAD SHORTNESS OF BREATH: ONCE A DAY
ACT_TOTALSCORE: 20

## 2024-03-27 ASSESSMENT — ANXIETY QUESTIONNAIRES
GAD7 TOTAL SCORE: 0
3. WORRYING TOO MUCH ABOUT DIFFERENT THINGS: NOT AT ALL
GAD7 TOTAL SCORE: 0
8. IF YOU CHECKED OFF ANY PROBLEMS, HOW DIFFICULT HAVE THESE MADE IT FOR YOU TO DO YOUR WORK, TAKE CARE OF THINGS AT HOME, OR GET ALONG WITH OTHER PEOPLE?: NOT DIFFICULT AT ALL
5. BEING SO RESTLESS THAT IT IS HARD TO SIT STILL: NOT AT ALL
7. FEELING AFRAID AS IF SOMETHING AWFUL MIGHT HAPPEN: NOT AT ALL
7. FEELING AFRAID AS IF SOMETHING AWFUL MIGHT HAPPEN: NOT AT ALL
2. NOT BEING ABLE TO STOP OR CONTROL WORRYING: NOT AT ALL
6. BECOMING EASILY ANNOYED OR IRRITABLE: NOT AT ALL
1. FEELING NERVOUS, ANXIOUS, OR ON EDGE: NOT AT ALL
GAD7 TOTAL SCORE: 0
4. TROUBLE RELAXING: NOT AT ALL
IF YOU CHECKED OFF ANY PROBLEMS ON THIS QUESTIONNAIRE, HOW DIFFICULT HAVE THESE PROBLEMS MADE IT FOR YOU TO DO YOUR WORK, TAKE CARE OF THINGS AT HOME, OR GET ALONG WITH OTHER PEOPLE: NOT DIFFICULT AT ALL

## 2024-03-27 ASSESSMENT — PATIENT HEALTH QUESTIONNAIRE - PHQ9
SUM OF ALL RESPONSES TO PHQ QUESTIONS 1-9: 0
SUM OF ALL RESPONSES TO PHQ QUESTIONS 1-9: 0
10. IF YOU CHECKED OFF ANY PROBLEMS, HOW DIFFICULT HAVE THESE PROBLEMS MADE IT FOR YOU TO DO YOUR WORK, TAKE CARE OF THINGS AT HOME, OR GET ALONG WITH OTHER PEOPLE: NOT DIFFICULT AT ALL

## 2024-03-27 ASSESSMENT — SOCIAL DETERMINANTS OF HEALTH (SDOH): HOW OFTEN DO YOU GET TOGETHER WITH FRIENDS OR RELATIVES?: TWICE A WEEK

## 2024-03-27 NOTE — LETTER
March 29, 2024      Demetrice Garcia  [address]   [facility] [address]        Dear ,    We are writing to inform you of your test results.    Your hemoglobin was normal- no anemia.  Your glucose control as measured by the HbA1c is normal, no signs of diabetes or prediabetes. This A1c measures your average blood sugar levels over the past 3 months and is not needed to be a fasting test.     Cholesterol levels look better than last year; great work.    Resulted Orders   Hemoglobin A1c   Result Value Ref Range    Hemoglobin A1C 5.2 0.0 - 5.6 %      Comment:      Normal <5.7%   Prediabetes 5.7-6.4%    Diabetes 6.5% or higher     Note: Adopted from ADA consensus guidelines.   Hemoglobin   Result Value Ref Range    Hemoglobin 14.4 11.7 - 15.7 g/dL   Lipid panel reflex to direct LDL Non-fasting   Result Value Ref Range    Cholesterol 204 (H) <200 mg/dL    Triglycerides 76 <150 mg/dL    Direct Measure HDL 66 >=50 mg/dL    LDL Cholesterol Calculated 123 (H) <=100 mg/dL    Non HDL Cholesterol 138 (H) <130 mg/dL    Patient Fasting > 8hrs? Unknown     Narrative    Cholesterol  Desirable:  <200 mg/dL    Triglycerides  Normal:  Less than 150 mg/dL  Borderline High:  150-199 mg/dL  High:  200-499 mg/dL  Very High:  Greater than or equal to 500 mg/dL    Direct Measure HDL  Female:  Greater than or equal to 50 mg/dL   Male:  Greater than or equal to 40 mg/dL    LDL Cholesterol  Desirable:  <100mg/dL  Above Desirable:  100-129 mg/dL   Borderline High:  130-159 mg/dL   High:  160-189 mg/dL   Very High:  >= 190 mg/dL    Non HDL Cholesterol  Desirable:  130 mg/dL  Above Desirable:  130-159 mg/dL  Borderline High:  160-189 mg/dL  High:  190-219 mg/dL  Very High:  Greater than or equal to 220 mg/dL   [facility]; [id number]; Tramadol and 0- Desmethyltramadol (Laboratory Miscellaneous Order)   Result Value Ref Range    See Scanned Result       Specimen received. Reordered and sent to performing laboratory. Report to follow up on completion.     Performing Laboratory [facility]     Test Name Tramadol and 0- Desmethyltramadol     Test Code 792023    Urine Drug Screen Panel   Result Value Ref Range    Amphetamines Urine Screen Negative Screen Negative      Comment:      Cutoff for a negative amphetamine is less than 500 ng/mL.    Barbituates Urine Screen Negative Screen Negative      Comment:      Cutoff for a negative barbiturate is less than 200 ng/mL.    Benzodiazepine Urine Screen Negative Screen Negative      Comment:      Cutoff for a negative benzodiazepine is less than 100 ng/mL.    Cannabinoids Urine Screen Negative Screen Negative      Comment:      Cutoff for a negative cannabinoid is less than 50 ng/mL.    Cocaine Urine Screen Negative Screen Negative      Comment:      Cutoff for a negative cocaine is less than 300 ng/mL.    Fentanyl Qual Urine Screen Negative Screen Negative      Comment:      Cutoff for negative fentanyl is less than 5 ng/mL.    Opiates Urine Screen Negative Screen Negative      Comment:      Cutoff for a negative opiate is less than 300 ng/mL.    PCP Urine Screen Negative Screen Negative      Comment:      Cutoff for a negative PCP is less than 25 ng/mL.   Creatinine random urine   Result Value Ref Range    Creatinine Urine mg/dL 120.0 mg/dL      Comment:      The reference ranges have not been established in urine creatinine. The results should be integrated into the clinical context for interpretation.       If you have any questions or concerns, please call the clinic at the number listed above.       Sincerely,      Gogo Nam MD

## 2024-03-27 NOTE — PROGRESS NOTES
Preventive Care Visit  [facility]  Gogo Nam MD, Family Medicine  Mar 27, 2024      Assessment & Plan     Medicare annual wellness visit, subsequent  - Home safety information was reviewed if pertinent for falls prevention: regular checkups with vision and hearing, mindful medication use heydi with OTCs, using any assisted walking devices, adequate shoes and feet wear, avoiding loose rugs, safety additions to the home if needed, keeping the body in good shape with regular exercise especially walking, and limiting alcohol intake.  - Social history was reviewed  - Patient is independent with activities of daily living  - We reviewed active symptoms and discussed management  - We reviewed list of healthcare providers for this patient.  - We also reviewed PHQ 9    - Hemoglobin A1c; Future  - Hemoglobin; Future  - Lipid panel reflex to direct LDL Non-fasting; Future    Mild intermittent asthma without complication  ACT 20; advised to resume flovent and prn albuterol given worsening in her sx (not using any inhalers for months). Can update ACT in about 1-2 months  - fluticasone (FLOVENT HFA) 110 MCG/ACT inhaler; Inhale 1 puff into the lungs 2 times daily  - albuterol (PROAIR HFA/PROVENTIL HFA/VENTOLIN HFA) 108 (90 Base) MCG/ACT inhaler; Inhale 2 puffs into the lungs every 6 hours as needed for shortness of breath or wheezing    Chronic, continuous use of opioids  Vulvodynia  Lichen Sclerosus Et Atrophicus  See my note from 11/26/18 reviewing her notable hx/work up/alternative pain therapies attempted in the past.   Reviewed how patient has tolerated tramadol 100 mg daily.  She takes this as two 50 mg tablets each morning (and once per month or less: needs to repeat this dose for max of 6 tabs that day if traveling/prolonged sitting).    - Rx for tramadol 50mg tabs (2 per day); #60 per month. Last filled 3/19/2024  - CSA signed/updated 3/27/2024   - UDS normal 4/2022 with presence of  tramadol. Repeat today   - PMDP no concerns. Shows no fills for state of [place] but normal fills with [place]  ([facility])   - ok for q 6 mo visits; understands need for this (last was 10/2022 with me)    Recurrent major depressive disorder, in partial remission (H)  Well controlled. Continue Cymbalta for now (for the chronic pain/mood). Was able to wean off sertraline completely last year for med simplification                   Counseling  Appropriate preventive services were discussed with this patient, including applicable screening as appropriate for fall prevention, nutrition, physical activity, Tobacco-use cessation, weight loss and cognition.  Checklist reviewing preventive services available has been given to the patient.  Reviewed patient's diet, addressing concerns and/or questions.   She is at risk for psychosocial distress and has been provided with information to reduce risk.   The patient was provided with written information regarding signs of hearing loss.           Balaji Suresh is a 78 year old, presenting for the following:  Wellness Visit (Not fasting, has not had any lab work in awhile, asthma is getting worse, questions about when to use inhaler. On and off gets sweaty and dizzy.)          Health Care Directive  Patient does not have a Health Care Directive or Living Will: Discussed advance care planning with patient; however, patient declined at this time.    HPI    Chief Complaint   Patient presents with    Wellness Visit     Not fasting, has not had any lab work in awhile, asthma is getting worse, questions about when to use inhaler. On and off gets sweaty and dizzy.       Had to cancel the appointment with me due to her 's surgery and then they were in [place] from Nov until now.     Chronic pain: on tramadol; [place] PMDP reviewed; no concerns; last filled 3/19/24   #60 tabs. With her recent [place] travel she tolerated it well.     Asthma: seems to be worsening  lately; gets easily out of breath to get the phone and lungs feel tight. Hasn't been using the Flovent in       Mood: was able to wean off sertraline last year; no difference without it.Continues cymbalta 60mg daily for chronic pain/mood    She occasionally gets hot flush/shakiness when standing a few times a year. (Ex: walking at mall- she was dehydrated and hadn't eaten that much the day it happened). Sitting resolves the symptoms after about 5 min. She will start to monitor BP at home when this happens. Her BP here is 100/62 is normal.         3/27/2024   General Health   How would you rate your overall physical health? Good   Feel stress (tense, anxious, or unable to sleep) Only a little   (!) STRESS CONCERN      3/27/2024   Nutrition   Diet: Regular (no restrictions)         3/27/2024   Exercise   Days per week of moderate/strenous exercise 4 days   Average minutes spent exercising at this level 60 min         3/27/2024   Social Factors   Frequency of gathering with friends or relatives Twice a week   Worry food won't last until get money to buy more No   Food not last or not have enough money for food? No   Do you have housing?  Yes   Are you worried about losing your housing? No   Lack of transportation? No   Unable to get utilities (heat,electricity)? No         5/25/2023   Fall Risk   Fallen 2 or more times in the past year? No          3/27/2024   Activities of Daily Living- Home Safety   Needs help with the following daily activites None of the above   Safety concerns in the home None of the above         3/27/2024   Dental   Dentist two times every year? Yes         3/27/2024   Hearing Screening   Hearing concerns? (!) IT'S HARD TO FOLLOW A CONVERSATION IN A NOISY RESTAURANT OR CROWDED ROOM.         3/27/2024   Driving Risk Screening   Patient/family members have concerns about driving No         3/27/2024   General Alertness/Fatigue Screening   Have you been more tired than usual lately? (!) DECLINE          3/27/2024   Urinary Incontinence Screening   Bothered by leaking urine in past 6 months No         3/27/2024   TB Screening   Were you born outside of the [place]? No       Today's PHQ-9 Score:       3/27/2024     8:36 AM   PHQ-9 SCORE   PHQ-9 Total Score MyChart 0   PHQ-9 Total Score 0         3/27/2024   Substance Use   Alcohol more than 3/day or more than 7/wk No   Do you have a current opioid prescription? No   How severe/bad is pain from 1 to 10? 0/10 (No Pain)   Do you use any other substances recreationally? No    (!) ALCOHOL    (!) PRESCRIPTION DRUGS     Social History     Tobacco Use    Smoking status: Former    Smokeless tobacco: Never   Vaping Use    Vaping Use: Never used   Substance Use Topics    Alcohol use: No    Drug use: No     ASCVD Risk   The 10-year ASCVD risk score (Amrit STARKS, et al., 2019) is: 14.4%    Values used to calculate the score:      Age: 78 years      Sex: Female      Is Non- : No      Diabetic: No      Tobacco smoker: No      Systolic Blood Pressure: 100 mmHg      Is BP treated: No      HDL Cholesterol: 66 mg/dL      Total Cholesterol: 213 mg/dL      Reviewed and updated as needed this visit by Provider   Tobacco  Allergies  Meds  Problems  Med Hx  Surg Hx  Fam Hx              Current providers sharing in care for this patient include:  Patient Care Team:  Gogo Nam MD as PCP - General  Gogo Nam MD as Assigned PCP  Gogo Nam MD as Assigned Pain Medication Provider    The following health maintenance items are reviewed in Epic and correct as of today:  Health Maintenance   Topic Date Due    RSV VACCINE (Pregnancy & 60+) (1 - 1-dose 60+ series) Never done    ASTHMA ACTION PLAN  04/14/2022    DTAP/TDAP/TD IMMUNIZATION (2 - Td or Tdap) 10/12/2022    URINE DRUG SCREEN  04/20/2023    CONTROLLED SUBSTANCE AGREEMENT FOR CHRONIC PAIN MANAGEMENT  04/22/2023    INFLUENZA VACCINE (1) 09/01/2023     "COVID-19 Vaccine (6 - 2023-24 season) 09/01/2023    ANNUAL REVIEW OF HM ORDERS  05/25/2024    FALL RISK ASSESSMENT  05/25/2024    ASTHMA CONTROL TEST  09/27/2024    MEDICARE ANNUAL WELLNESS VISIT  03/27/2025    JOSÉ LUIS ASSESSMENT  03/27/2025    PHQ-9  03/27/2025    GLUCOSE  05/25/2026    LIPID  04/20/2027    ADVANCE CARE PLANNING  04/26/2027    DEXA  04/26/2036    Pneumococcal Vaccine: 65+ Years  Completed    ZOSTER IMMUNIZATION  Completed    DEPRESSION ACTION PLAN  Addressed    IPV IMMUNIZATION  Aged Out    HPV IMMUNIZATION  Aged Out    MENINGITIS IMMUNIZATION  Aged Out    RSV MONOCLONAL ANTIBODY  Aged Out    HEPATITIS C SCREENING  Discontinued    MAMMO SCREENING  Discontinued    COLORECTAL CANCER SCREENING  Discontinued    LUNG CANCER SCREENING  Discontinued            Objective    Exam  /62 (BP Location: Left arm, Patient Position: Sitting, Cuff Size: Adult Regular)   Pulse 60   Resp 18   Ht 1.626 m (5' 4\")   Wt 63.6 kg (140 lb 3 oz)   SpO2 97%   BMI 24.06 kg/m     Estimated body mass index is 24.06 kg/m  as calculated from the following:    Height as of this encounter: 1.626 m (5' 4\").    Weight as of this encounter: 63.6 kg (140 lb 3 oz).    Physical Exam  GENERAL: alert and no distress  NECK: no adenopathy, no asymmetry, masses, or scars  RESP: lungs clear to auscultation - no rales, rhonchi or wheezes  CV: regular rate and rhythm, normal S1 S2, no S3 or S4, no murmur, click or rub, no peripheral edema  ABDOMEN: soft, nontender, no hepatosplenomegaly, no masses and bowel sounds normal  MS: no gross musculoskeletal defects noted, no edema         3/27/2024   Mini Cog   Clock Draw Score 2 Normal   3 Item Recall 3 objects recalled   Mini Cog Total Score 5            Signed Electronically by: Gogo Nam MD    Answers submitted by the patient for this visit:  Patient Health Questionnaire (Submitted on 3/27/2024)  If you checked off any problems, how difficult have these problems made it for " you to do your work, take care of things at home, or get along with other people?: Not difficult at all  PHQ9 TOTAL SCORE: 0  JOSÉ LUIS-7 (Submitted on 3/27/2024)  JOSÉ LUIS 7 TOTAL SCORE: 0

## 2024-03-27 NOTE — LETTER
Opioid / Opioid Plus Controlled Substance Agreement    Tramadol 50mg tablets- takes 100mg daily, with PRN during travel 50mg q6hr as needed  [facility] [place] pharmacy- generally call to confirm due to pharmacy not in  through Epic; can access through remote login to [facility] and request [place]    This is an agreement between you and your provider about the safe and appropriate use of controlled substance/opioids prescribed by your care team. Controlled substances are medicines that can cause physical and mental dependence (abuse).    There are strict laws about having and using these medicines. We here at [facility] are committing to working with you in your efforts to get better. To support you in this work, we ll help you schedule regular office appointments for medicine refills. If we must cancel or change your appointment for any reason, we ll make sure you have enough medicine to last until your next appointment.     As a Provider, I will:  Listen carefully to your concerns and treat you with respect.   Recommend a treatment plan that I believe is in your best interest. This plan may involve therapies other than opioid pain medication.   Talk with you often about the possible benefits, and the risk of harm of any medicine that we prescribe for you.   Provide a plan on how to taper (discontinue or go off) using this medicine if the decision is made to stop its use.    As a Patient, I understand that opioid(s):   Are a controlled substance prescribed by my care team to help me function or work and manage my condition(s).   Are strong medicines and can cause serious side effects such as:  Drowsiness, which can seriously affect my driving ability  A lower breathing rate, enough to cause death  Harm to my thinking ability   Depression   Abuse of and addiction to this medicine  Need to be taken exactly as prescribed. Combining opioids with certain medicines or chemicals (such as illegal drugs, sedatives,  sleeping pills, and benzodiazepines) can be dangerous or even fatal. If I stop opioids suddenly, I may have severe withdrawal symptoms.  Do not work for all types of pain nor for all patients. If they re not helpful, I may be asked to stop them.        The risks, benefits and side effects of these medicine(s) were explained to me. I agree that:  I will take part in other treatments as advised by my care team. This may be psychiatry or counseling, physical therapy, behavioral therapy, group treatment or a referral to a specialist.     I will keep all my appointments. I understand that this is part of the monitoring of opioids. My care team may require an office visit for EVERY opioid/controlled substance refill. If I miss appointments or don t follow instructions, my care team may stop my medicine.    I will take my medicines as prescribed. I will not change the dose or schedule unless my care team tells me to. There will be no refills if I run out early.     I may be asked to come to the clinic and complete a urine drug test or complete a pill count at any time. If I don t give a urine sample or participate in a pill count, the care team may stop my medicine.    I will only receive prescriptions from this clinic for chronic pain. If I am treated by another provider for acute pain issues, I will tell them that I am taking opioid pain medication for chronic pain and that I have a treatment agreement with this provider. I will inform my [facility] care team within one business day if I am given a prescription for any pain medication by another healthcare provider. My [facility] care team can contact other providers and pharmacists about my use of any medicines.    It is up to me to make sure that I don t run out of my medicines on weekends or holidays. If my care team is willing to refill my opioid prescription without a visit, I must request refills only during office hours. Refills may take up to 3  business days to process. I will use one pharmacy to fill all my opioid and other controlled substance prescriptions. I will notify the clinic about any changes to my insurance or medication availability.    I am responsible for my prescriptions. If the medicine/prescription is lost, stolen or destroyed, it will not be replaced. I also agree not to share controlled substance medicines with anyone.    I am aware I should not use any illegal or recreational drugs. I agree not to drink alcohol unless my care team says I can.       If I enroll in the [place] Medical Cannabis program, I will tell my care team prior to my next refill.     I will tell my care team right away if I become pregnant, have a new medical problem treated outside of my regular clinic, or have a change in my medications.    I understand that this medicine can affect my thinking, judgment and reaction time. Alcohol and drugs affect the brain and body, which can affect the safety of my driving. Being under the influence of alcohol or drugs can affect my decision-making, behaviors, personal safety, and the safety of others. Driving while impaired (DWI) can occur if a person is driving, operating, or in physical control of a car, motorcycle, boat, snowmobile, ATV, motorbike, off-road vehicle, or any other motor vehicle ([place] Statute 169A.20). I understand the risk if I choose to drive or operate any vehicle or machinery.    I understand that if I do not follow any of the conditions above, my prescriptions or treatment may be stopped or changed.    I agree that my provider, clinic care team, and pharmacy may work with any city, state or federal law enforcement agency that investigates the misuse, sale, or other diversion of my controlled medicine. I will allow my provider to discuss my care with, or share a copy of, this agreement with any other treating provider, pharmacy or emergency room where I receive care.    I have read this agreement and have  asked questions about anything I did not understand.    _______________________________________________________  Patient Signature - Demetrice J Garcia _____________________                   Date     _______________________________________________________  Provider Signature - Gogo Halle Viv, MD   _____________________                   Date     _______________________________________________________  Witness Signature (required if provider not present while patient signing)   _____________________                   Date

## 2024-03-29 ENCOUNTER — TELEPHONE (OUTPATIENT)
Dept: FAMILY MEDICINE | Facility: CLINIC | Age: 79
End: 2024-03-29
Payer: COMMERCIAL

## 2024-03-29 DIAGNOSIS — J45.20 MILD INTERMITTENT ASTHMA WITHOUT COMPLICATION: Primary | ICD-10-CM

## 2024-03-29 NOTE — TELEPHONE ENCOUNTER
Prior Authorization Retail Medication Request    Medication/Dose: fluticasone (FLOVENT HFA) 110 MCG/ACT inhaler  Diagnosis and ICD code (if different than what is on RX):  see chart  New/renewal/insurance change PA/secondary ins. PA:  Previously Tried and Failed:  see chart  Rationale:  see chart    Insurance   Primary: Medicare  Insurance ID:  [id number]     Secondary (if applicable):[facility]  Insurance ID:  [id number]

## 2024-04-01 LAB — LABCORP INTERFACED MISCELLANEOUS TEST RESULT: NORMAL

## 2024-04-10 NOTE — TELEPHONE ENCOUNTER
Central Prior Authorization Team   Phone: [phone]    PA Initiation    Medication: fluticasone (FLOVENT HFA) 110 MCG/ACT inhaler  Insurance Company: [email or web address] ([facility]) - Phone [phone] Fax [phone]  Pharmacy Filling the Rx: [id number] - [place], [place] - [address] AT [facility] [address]  Filling Pharmacy Phone: [phone]  Filling Pharmacy Fax:    Start Date: 4/10/2024

## 2024-04-11 NOTE — TELEPHONE ENCOUNTER
PRIOR AUTHORIZATION DENIED    Medication: fluticasone (FLOVENT HFA) 110 MCG/ACT inhaler-PA DENIED     Denial Date: 4/11/2024    Denial Rational:           Appeal Information:

## 2024-04-15 NOTE — TELEPHONE ENCOUNTER
Please notify pt I changed asthma inhaler to Pulmicort since that is hopefully better covered by her insurance.

## 2024-04-25 DIAGNOSIS — F11.90 CHRONIC, CONTINUOUS USE OF OPIOIDS: ICD-10-CM

## 2024-04-25 DIAGNOSIS — N94.819 VULVODYNIA: ICD-10-CM

## 2024-04-26 RX ORDER — TRAMADOL HYDROCHLORIDE 50 MG/1
TABLET ORAL
Qty: 60 TABLET | Refills: 0 | Status: SHIPPED | OUTPATIENT
Start: 2024-04-26 | End: 2024-05-23

## 2024-05-08 DIAGNOSIS — J45.20 MILD INTERMITTENT ASTHMA WITHOUT COMPLICATION: ICD-10-CM

## 2024-05-08 RX ORDER — ALBUTEROL SULFATE 90 UG/1
2 AEROSOL, METERED RESPIRATORY (INHALATION) EVERY 6 HOURS PRN
Qty: 18 G | Refills: 1 | Status: SHIPPED | OUTPATIENT
Start: 2024-05-08

## 2024-05-08 NOTE — TELEPHONE ENCOUNTER
Prescription approved per [facility] Refill Protocol.  Sondra Rodriguez, RN  [facility] Triage Nurse

## 2024-05-14 ENCOUNTER — NURSE TRIAGE (OUTPATIENT)
Dept: NURSING | Facility: CLINIC | Age: 79
End: 2024-05-14

## 2024-05-14 ENCOUNTER — ANCILLARY PROCEDURE (OUTPATIENT)
Dept: GENERAL RADIOLOGY | Facility: CLINIC | Age: 79
End: 2024-05-14
Attending: PHYSICIAN ASSISTANT
Payer: COMMERCIAL

## 2024-05-14 ENCOUNTER — OFFICE VISIT (OUTPATIENT)
Dept: FAMILY MEDICINE | Facility: CLINIC | Age: 79
End: 2024-05-14
Payer: COMMERCIAL

## 2024-05-14 VITALS
HEART RATE: 67 BPM | SYSTOLIC BLOOD PRESSURE: 132 MMHG | DIASTOLIC BLOOD PRESSURE: 82 MMHG | BODY MASS INDEX: 23.52 KG/M2 | WEIGHT: 137 LBS

## 2024-05-14 DIAGNOSIS — R19.7 DIARRHEA, UNSPECIFIED TYPE: Primary | ICD-10-CM

## 2024-05-14 DIAGNOSIS — R19.7 DIARRHEA, UNSPECIFIED TYPE: ICD-10-CM

## 2024-05-14 LAB
ERYTHROCYTE [DISTWIDTH] IN BLOOD BY AUTOMATED COUNT: 12.8 % (ref 10–15)
HCT VFR BLD AUTO: 42.1 % (ref 35–47)
HGB BLD-MCNC: 13.9 G/DL (ref 11.7–15.7)
MCH RBC QN AUTO: 32.6 PG (ref 26.5–33)
MCHC RBC AUTO-ENTMCNC: 33 G/DL (ref 31.5–36.5)
MCV RBC AUTO: 99 FL (ref 78–100)
PLATELET # BLD AUTO: 270 10E3/UL (ref 150–450)
RBC # BLD AUTO: 4.27 10E6/UL (ref 3.8–5.2)
WBC # BLD AUTO: 5.9 10E3/UL (ref 4–11)

## 2024-05-14 PROCEDURE — 99214 OFFICE O/P EST MOD 30 MIN: CPT | Performed by: PHYSICIAN ASSISTANT

## 2024-05-14 PROCEDURE — 84443 ASSAY THYROID STIM HORMONE: CPT | Performed by: PHYSICIAN ASSISTANT

## 2024-05-14 PROCEDURE — 80053 COMPREHEN METABOLIC PANEL: CPT | Performed by: PHYSICIAN ASSISTANT

## 2024-05-14 PROCEDURE — 36415 COLL VENOUS BLD VENIPUNCTURE: CPT | Performed by: PHYSICIAN ASSISTANT

## 2024-05-14 PROCEDURE — 74019 RADEX ABDOMEN 2 VIEWS: CPT | Mod: TC | Performed by: RADIOLOGY

## 2024-05-14 PROCEDURE — 85027 COMPLETE CBC AUTOMATED: CPT | Performed by: PHYSICIAN ASSISTANT

## 2024-05-14 NOTE — PROGRESS NOTES
Assessment & Plan   1. Diarrhea, unspecified type    - TSH with free T4 reflex; Future  - Comprehensive metabolic panel (BMP + Alb, Alk Phos, ALT, AST, Total. Bili, TP); Future  - XR Abdomen 2 Views; Future  - CBC with platelets; Future  - TSH with free T4 reflex  - Comprehensive metabolic panel (BMP + Alb, Alk Phos, ALT, AST, Total. Bili, TP)  - CBC with platelets    Xr and lab work today.  Will call pt with results and plan            Subjective   Demetrice is a 78 year old, presenting for the following health issues:  Diarrhea (Few months and getting worse, has 4 episodes daily)        5/14/2024     2:40 PM   Additional Questions   Roomed by Quincy   Accompanied by self     History of Present Illness       Reason for visit:  Diahrea  Symptom onset:  More than a month  Symptom intensity:  Severe  Symptom progression:  Worsening  Had these symptoms before:  No    She eats 2-3 servings of fruits and vegetables daily.She consumes 2 sweetened beverage(s) daily.She exercises with enough effort to increase her heart rate 60 or more minutes per day.  She exercises with enough effort to increase her heart rate 5 days per week.   She is taking medications regularly.       Diarrhea x 4 months, getting worse over the past 2 months.  Denies blood in her stool.  Diarrhea occurs in the morning and then improves throughout the day.  Has about 4 episodes daily.  Denies travel, denies change in diet.  Denies abdominal pain, nausea or vomiting.  Feels stool has become more watery over the past 2 week.  Her sister passed away 2 weeks ago and notes increase in stress but no other changes.  Imodium helps with symptoms. Reports normal colonoscopies in the past.        Review of Systems  Constitutional, HEENT, cardiovascular, pulmonary, gi and gu systems are negative, except as otherwise noted.      Objective    /82 (BP Location: Left arm, Patient Position: Sitting, Cuff Size: Adult Regular)   Pulse 67   Wt 62.1 kg (137 lb)   PF  98 L/min   BMI 23.52 kg/m    Body mass index is 23.52 kg/m .  Physical Exam   GENERAL: alert and no distress  NECK: no adenopathy, no asymmetry, masses, or scars  RESP: lungs clear to auscultation - no rales, rhonchi or wheezes  CV: regular rate and rhythm, normal S1 S2, no peripheral edema  ABDOMEN: soft, nontender, no hepatosplenomegaly, no masses and bowel sounds normal  MS: no gross musculoskeletal defects noted, no edema            Signed Electronically by: Elif Fisher PA-C

## 2024-05-14 NOTE — TELEPHONE ENCOUNTER
"Nurse Triage SBAR    Is this a 2nd Level Triage? NO    Situation:  Patient calling reporting diarrhea x 2 months.    Background:   Reporting increased stress due to death of sister.    Assessment:    Patient reporting \"diarrhea\" x 2 months.  Passing 4-5 watery stools per day.  Difficulty sleeping.  Afebrile.  Taking fluids. Denies change in urine out put.    Protocol Recommended Disposition:   See today in clinic. Transferred to Central Scheduling.      Reason for Disposition   MODERATE diarrhea (e.g., 4-6 times / day more than normal) and present > 48 hours (2 days)    Additional Information   Negative: Shock suspected (e.g., cold/pale/clammy skin, too weak to stand, low BP, rapid pulse)   Negative: Difficult to awaken or acting confused (e.g., disoriented, slurred speech)   Negative: Sounds like a life-threatening emergency to the triager   Negative: SEVERE abdominal pain (e.g., excruciating) and present > 1 hour   Negative: SEVERE abdominal pain and age > 60 years   Negative: Bloody, black, or tarry bowel movements  (Exception: Chronic-unchanged black-grey bowel movements and is taking iron pills or Pepto-Bismol.)   Negative: SEVERE diarrhea (e.g., 7 or more times / day more than normal) and age > 60 years   Negative: Constant abdominal pain lasting > 2 hours   Negative: Drinking very little and dehydration suspected (e.g., no urine > 12 hours, very dry mouth, very lightheaded)   Negative: Patient sounds very sick or weak to the triager   Negative: SEVERE diarrhea (e.g., 7 or more times / day more than normal) and present > 24 hours (1 day)    Protocols used: Diarrhea-A-[place]    "

## 2024-05-15 LAB
ALBUMIN SERPL BCG-MCNC: 4.2 G/DL (ref 3.5–5.2)
ALP SERPL-CCNC: 61 U/L (ref 40–150)
ALT SERPL W P-5'-P-CCNC: 13 U/L (ref 0–50)
ANION GAP SERPL CALCULATED.3IONS-SCNC: 10 MMOL/L (ref 7–15)
AST SERPL W P-5'-P-CCNC: 18 U/L (ref 0–45)
BILIRUB SERPL-MCNC: 0.3 MG/DL
BUN SERPL-MCNC: 12.1 MG/DL (ref 8–23)
CALCIUM SERPL-MCNC: 9.5 MG/DL (ref 8.8–10.2)
CHLORIDE SERPL-SCNC: 101 MMOL/L (ref 98–107)
CREAT SERPL-MCNC: 0.83 MG/DL (ref 0.51–0.95)
DEPRECATED HCO3 PLAS-SCNC: 26 MMOL/L (ref 22–29)
EGFRCR SERPLBLD CKD-EPI 2021: 72 ML/MIN/1.73M2
GLUCOSE SERPL-MCNC: 92 MG/DL (ref 70–99)
POTASSIUM SERPL-SCNC: 4.8 MMOL/L (ref 3.4–5.3)
PROT SERPL-MCNC: 6.7 G/DL (ref 6.4–8.3)
SODIUM SERPL-SCNC: 137 MMOL/L (ref 135–145)
TSH SERPL DL<=0.005 MIU/L-ACNC: 3.81 UIU/ML (ref 0.3–4.2)

## 2024-05-16 ENCOUNTER — TELEPHONE (OUTPATIENT)
Dept: FAMILY MEDICINE | Facility: CLINIC | Age: 79
End: 2024-05-16
Payer: COMMERCIAL

## 2024-05-16 DIAGNOSIS — R19.7 DIARRHEA, UNSPECIFIED TYPE: Primary | ICD-10-CM

## 2024-05-16 PROBLEM — K52.831 COLLAGENOUS COLITIS: Status: ACTIVE | Noted: 2024-05-16

## 2024-05-16 RX ORDER — BUDESONIDE 3 MG/1
9 CAPSULE, COATED PELLETS ORAL EVERY MORNING
Qty: 90 CAPSULE | Refills: 1 | Status: CANCELLED | OUTPATIENT
Start: 2024-05-16 | End: 2024-07-15

## 2024-05-16 NOTE — TELEPHONE ENCOUNTER
General Call    Contacts         Type Contact Phone/Fax    05/16/2024 09:03 AM CDT Phone (Incoming) Demetrice Garcia (Self) [phone] (M)          Reason for Call:  Pt is still having diarrhea. Would like Elif Fisher to review results and get back to her please.    Could we send this information to you in [employer] or would you prefer to receive a phone call?:   Patient would prefer a phone call   Okay to leave a detailed message?: Yes at Cell number on file:    Telephone Information:   Mobile [phone]   Mobile [phone]

## 2024-05-16 NOTE — TELEPHONE ENCOUNTER
Called and spoke to Demetrice regarding labs and x-ray.  I did speak to radiology as well and they do not note excess stool but there are air-fluid levels suggestive CT for further imaging.  Will also order stool cultures.  Pt will stop into clinic to get stool collection materials and we will also schedule CT      Elif Fisher PA-C

## 2024-05-17 ENCOUNTER — TELEPHONE (OUTPATIENT)
Dept: FAMILY MEDICINE | Facility: CLINIC | Age: 79
End: 2024-05-17
Payer: COMMERCIAL

## 2024-05-17 ENCOUNTER — APPOINTMENT (OUTPATIENT)
Dept: LAB | Facility: CLINIC | Age: 79
End: 2024-05-17
Payer: COMMERCIAL

## 2024-05-17 DIAGNOSIS — K52.831 COLLAGENOUS COLITIS: Primary | ICD-10-CM

## 2024-05-17 LAB

## 2024-05-17 PROCEDURE — 87493 C DIFF AMPLIFIED PROBE: CPT | Mod: 59 | Performed by: FAMILY MEDICINE

## 2024-05-17 PROCEDURE — 87507 IADNA-DNA/RNA PROBE TQ 12-25: CPT | Performed by: FAMILY MEDICINE

## 2024-05-17 RX ORDER — BUDESONIDE 3 MG/1
9 CAPSULE, COATED PELLETS ORAL EVERY MORNING
Qty: 90 CAPSULE | Refills: 1 | Status: SHIPPED | OUTPATIENT
Start: 2024-05-17

## 2024-05-17 NOTE — TELEPHONE ENCOUNTER
05/17/54  Pt called to ask what pharmacy med was sent to. Relayed to pt that it was the [facility] on [address].   [place]lyn

## 2024-05-17 NOTE — TELEPHONE ENCOUNTER
Patient was seen for diarrhea and reports her colonoscopies have been normal no prior history of this type of diarrhea.    However Per chart review- had colonoscopy 2014 that showed collagenous colitis, saw GI 2017 for diarrhea and symptoms were due to collagenous colitis again.     Suspect this is due to the same collagenous colitis as she has watery diarrhea for the past 2 months again we will send in budesonide for treatment.  Recommend follow-up with GI as well    Voice mail left for patient, will send medical message as well.     Elif Fisher PA-C

## 2024-05-21 ENCOUNTER — TELEPHONE (OUTPATIENT)
Dept: FAMILY MEDICINE | Facility: CLINIC | Age: 79
End: 2024-05-21
Payer: COMMERCIAL

## 2024-05-21 DIAGNOSIS — K52.831 COLLAGENOUS COLITIS: ICD-10-CM

## 2024-05-21 DIAGNOSIS — R19.7 DIARRHEA, UNSPECIFIED TYPE: Primary | ICD-10-CM

## 2024-05-21 NOTE — TELEPHONE ENCOUNTER
General Call    Contacts         Type Contact Phone/Fax    05/21/2024 03:41 PM CDT Phone (Incoming) Demetrice Garcia (Self) [phone] (M)          Reason for Call:  [facility] from [facility] - Pt is allergic to IV contrast - there is an order in pt's chart for a CT with contrast. Wondering if the provider wanted to put in a referral without contrast or pre-medicated?  Please advise and call pt back.    Could we send this information to you in [facility] or would you prefer to receive a phone call?:   Patient would prefer a phone call   Okay to leave a detailed message?: No at Cell number on file:    Telephone Information:   Mobile [phone]   Mobile [phone]

## 2024-05-21 NOTE — TELEPHONE ENCOUNTER
Reviewed Phillip's notes and can start without contrast for the CT; will change order    Please help pt schedule follow up with me if she hasn't been able to get in with GI

## 2024-05-22 NOTE — TELEPHONE ENCOUNTER
5-22-24  Called pt informed her new order for Ct was ordered w/o contrast & pt is scheduled w/ Dr Nam 5-23 for GI  Anshul

## 2024-05-23 ENCOUNTER — OFFICE VISIT (OUTPATIENT)
Dept: FAMILY MEDICINE | Facility: CLINIC | Age: 79
End: 2024-05-23
Payer: COMMERCIAL

## 2024-05-23 VITALS
DIASTOLIC BLOOD PRESSURE: 62 MMHG | WEIGHT: 134.2 LBS | HEART RATE: 73 BPM | OXYGEN SATURATION: 98 % | SYSTOLIC BLOOD PRESSURE: 112 MMHG | BODY MASS INDEX: 23.04 KG/M2

## 2024-05-23 DIAGNOSIS — N94.819 VULVODYNIA: ICD-10-CM

## 2024-05-23 DIAGNOSIS — K52.831 COLLAGENOUS COLITIS: Primary | ICD-10-CM

## 2024-05-23 DIAGNOSIS — F32.A DEPRESSION, UNSPECIFIED DEPRESSION TYPE: ICD-10-CM

## 2024-05-23 DIAGNOSIS — J45.20 MILD INTERMITTENT ASTHMA WITHOUT COMPLICATION: ICD-10-CM

## 2024-05-23 DIAGNOSIS — F11.90 CHRONIC, CONTINUOUS USE OF OPIOIDS: ICD-10-CM

## 2024-05-23 PROCEDURE — 99214 OFFICE O/P EST MOD 30 MIN: CPT | Performed by: FAMILY MEDICINE

## 2024-05-23 PROCEDURE — G2211 COMPLEX E/M VISIT ADD ON: HCPCS | Performed by: FAMILY MEDICINE

## 2024-05-23 RX ORDER — TRAMADOL HYDROCHLORIDE 50 MG/1
TABLET ORAL
Qty: 60 TABLET | Refills: 0 | Status: SHIPPED | OUTPATIENT
Start: 2024-05-23 | End: 2024-07-08

## 2024-05-23 RX ORDER — DULOXETIN HYDROCHLORIDE 60 MG/1
60 CAPSULE, DELAYED RELEASE ORAL DAILY
Qty: 90 CAPSULE | Refills: 1 | Status: SHIPPED | OUTPATIENT
Start: 2024-05-23 | End: 2024-07-08

## 2024-05-23 NOTE — PROGRESS NOTES
Assessment & Plan     Collagenous colitis  Today we reviewed carefully the note from 2017 with Dr. Casas of [facility] in which she was evaluated for similar watery diarrhea and currently mild 4 pound weight loss.  She has had a reassuring BMP, CBC and TSH on 5/14.  I offered some inflammatory labs today however she declines additional blood work at this time.  An abdominal x-ray on 5/14 was reviewed and also normal.  She is scheduled for CT abdomen coming up next week however I offered that she could cancel this for now as it appears she has had 1 back in 2017 when she had similar symptoms and it was unrevealing and she denies any abdominal pain or fevers at this time or other red flag factors.  She understands she would best be served by a repeat colonoscopy for further evaluation to the extent of her symptoms however at this point she would like to discuss first with GI.  Referral was placed.    She will continue the budesonide 9 mg daily for 12 weeks and we will consider tapering off after that or sooner as symptoms heal.   - Adult GI  Referral - Consult Only; Future  - Erythrocyte sedimentation rate auto; Future  - CRP, inflammation; Future    Chronic, continuous use of opioids  Vulvodynia  Stable, due for refill; has chronic follow up scheduled for Sept.  was reviewed, no concerns.  She is apprehensive to do another colonoscopy because the colon prep and even her current diarrhea issues do cause exacerbation in her vulvodynia symptoms but she understands the importance and would proceed if indicated by GI  - traMADol (ULTRAM) 50 MG tablet; TAKE 2 TABLETS BY MOUTH EVERY DAY, MAY TAKE UP TO 6 TABLETS ON TRAVEL DAYS AS 2 TABLETS BY MOUTH EVERY 8 HOURS    Depression, unspecified depression type  Stable, doing well; off sertraline for 9 months; reviewed the duloxetine may be a contributing factor for possible colitis as above but she prefers to hold off for now on changing this Rx until talking to  GI  - DULoxetine (CYMBALTA) 60 MG capsule; Take 1 capsule (60 mg) by mouth daily    Mild intermittent asthma without complication  Doing well; we did review I already changed her fluticasone to pulmicort back on 4/15; she isn't sure if she has started it yet. (Due to [place] coverage needing med change)    {  Follow-up in sept as scheduled  The longitudinal plan of care for the diagnosis(es)/condition(s) as documented were addressed during this visit. Due to the added complexity in care, I will continue to support Demetrice in the subsequent management and with ongoing continuity of care.      Subjective   Demetrice is a 78 year old, presenting for the following health issues:  Gastrointestinal Problem (Has been having some diarrhea and was seeing SHELBY Verde- Started taking budesonide capsules this week.)        5/23/2024     9:24 AM   Additional Questions   Roomed by Benita BARRETO LPN     HPI     Diarrhea off and on x 4 months (didn't think anything of it at our 3/27 visit)- worsening in the past 2 months however.  Has noticed some improvement in her diarrhea with starting budesonide capsules - this was recommended by covering provider who saw her recently on 5/14 for diarrhea and did an XR abdomen and CMP, CBC, TSH. These were all normal.     Was waking in the middle of night with watery diarrhea.    Denies blood in her stool.  Diarrhea occurs in the morning and then improves throughout the day.  Has about 4 episodes daily.  Denies travel, denies change in diet.  Denies abdominal pain, nausea or vomiting.  Her sister passed away early May and notes increase in stress but no other changes.  Imodium helps with symptoms. Reports normal colonoscopies in the past aside from hx collagenous colitis.     colonoscopy in 2014 that showed collagenous colitis, saw GI 2017 for diarrhea and symptoms were due to collagenous colitis again.        She was able to wean off the sertraline 9 months ago  Continues on cymbalta 60mg                   Objective    Wt 60.9 kg (134 lb 3.2 oz)   BMI 23.04 kg/m    Body mass index is 23.04 kg/m .  Physical Exam   General: Alert, no acute distress.   HEENT: normocephalic conjunctivae are clear. EOMI  Neck: supple   Lungs: Normal effort  Heart: regular rate  Abdomen: soft   Skin: clear without rash or lesions  Neuro: alert, oriented  Psych: mood good, affect appropriate, good eye contact, insight and judgment intact, normal speech pattern.            EXAM: XR ABDOMEN 2 VIEWS  LOCATION: [facility]  DATE: 5/14/2024    INDICATION: Diarrhea, unspecified type  COMPARISON: CT 9/11/2017      IMPRESSION: Negative abdomen. Bowel gas pattern is normal. Nothing for obstruction or free air. No evidence for renal stones. Degenerative scoliosis in the spine.     Signed Electronically by: Gogo Nam MD

## 2024-05-23 NOTE — PATIENT INSTRUCTIONS
If your stomach is not settling with food first then the steroid pill, you can add OTC omeprazole 20mg once daily to protect the stomach lining    Ok to wait on the CT abdomen or colonoscopy for now    I will place a GI referral       ---------  You were given in April an Rx for Fluticasone (Flovent) inhaler which wasn't covered. I sent instead budesonide (Pulmicort flexhaler). That should be covered.    ------------    You can call the radiology department at [phone] to CANCEL  your imaging test that was ordered.

## 2024-07-08 DIAGNOSIS — F32.A DEPRESSION, UNSPECIFIED DEPRESSION TYPE: ICD-10-CM

## 2024-07-08 DIAGNOSIS — N94.819 VULVODYNIA: ICD-10-CM

## 2024-07-08 DIAGNOSIS — F11.90 CHRONIC, CONTINUOUS USE OF OPIOIDS: ICD-10-CM

## 2024-07-09 NOTE — TELEPHONE ENCOUNTER
Staff: please call pharmacy and confirm last  date for tramadol ans [place] PMDP not easily available currently for me on this encounter for some reason

## 2024-07-11 RX ORDER — TRAMADOL HYDROCHLORIDE 50 MG/1
100 TABLET ORAL EVERY 8 HOURS PRN
Qty: 60 TABLET | Refills: 0 | Status: SHIPPED | OUTPATIENT
Start: 2024-07-11 | End: 2024-08-12

## 2024-07-11 RX ORDER — DULOXETIN HYDROCHLORIDE 60 MG/1
60 CAPSULE, DELAYED RELEASE ORAL DAILY
Qty: 90 CAPSULE | Refills: 1 | Status: SHIPPED | OUTPATIENT
Start: 2024-07-11 | End: 2024-09-26

## 2024-07-17 ENCOUNTER — TRANSFERRED RECORDS (OUTPATIENT)
Dept: HEALTH INFORMATION MANAGEMENT | Facility: CLINIC | Age: 79
End: 2024-07-17
Payer: COMMERCIAL

## 2024-08-12 ENCOUNTER — TELEPHONE (OUTPATIENT)
Dept: FAMILY MEDICINE | Facility: CLINIC | Age: 79
End: 2024-08-12
Payer: COMMERCIAL

## 2024-08-12 DIAGNOSIS — N94.819 VULVODYNIA: ICD-10-CM

## 2024-08-12 DIAGNOSIS — F11.90 CHRONIC, CONTINUOUS USE OF OPIOIDS: ICD-10-CM

## 2024-08-12 RX ORDER — TRAMADOL HYDROCHLORIDE 50 MG/1
100 TABLET ORAL EVERY 8 HOURS PRN
Qty: 60 TABLET | Refills: 0 | Status: SHIPPED | OUTPATIENT
Start: 2024-08-12 | End: 2024-09-19

## 2024-08-12 NOTE — TELEPHONE ENCOUNTER
Medication Question or Refill        What medication are you calling about (include dose and sig)?: Tramadol Take 2 tablets (100 mg) by mouth every 8 hours as needed for severe pain TAKE 2 TABLETS BY MOUTH EVERY DAY, MAY TAKE UP TO 6 TABLETS ON TRAVEL DAYS AS 2 TABLETS BY MOUTH EVERY 8 HOURS - Oral     Preferred Pharmacy:   [id number] - [place], [place] - [address] [facility] [address]  [address]  [place] [address]  Phone: [phone] Fax: [phone]      Controlled Substance Agreement on file:   CSA -- Patient Level:     [Media Unavailable] Controlled Substance Agreement - Opioid - Scan on 3/27/2024 11:21 AM   [Media Unavailable] Controlled Substance Agreement - Opioid - Scan on 4/14/2021   [Media Unavailable] Controlled Substance Agreement - Non - Opioid - Scan on 11/27/2019: NON-OPIOID CONTROLLED SUBSTANCE AGREEMENT   [Media Unavailable] Controlled Substance Agreement - Non - Opioid - Scan on 11/25/2020   [Media Unavailable] Controlled Substance Agreement - Opioid - Scan on 11/29/2018       Who prescribed the medication?: Dr Nam    Do you need a refill? Yes    When did you use the medication last? today    Patient offered an appointment? Yes: 9/26/24    Do you have any questions or concerns?  No      Could we send this information to you in [facility] or would you prefer to receive a phone call?:   Patient would prefer a phone call   Okay to leave a detailed message?: Yes at Cell number on file:    Telephone Information:   Mobile [phone]   Mobile Not on file.

## 2024-09-19 DIAGNOSIS — F11.90 CHRONIC, CONTINUOUS USE OF OPIOIDS: ICD-10-CM

## 2024-09-19 DIAGNOSIS — N94.819 VULVODYNIA: ICD-10-CM

## 2024-09-19 RX ORDER — TRAMADOL HYDROCHLORIDE 50 MG/1
100 TABLET ORAL EVERY 8 HOURS PRN
Qty: 60 TABLET | Refills: 0 | Status: SHIPPED | OUTPATIENT
Start: 2024-09-19

## 2024-09-19 NOTE — TELEPHONE ENCOUNTER
Medication Question or Refill    Contacts       Contact Date/Time Type Contact Phone/Fax    09/19/2024 08:21 AM CDT Phone (Incoming) Demetrice Garcia (Self) [phone] (M)            What medication are you calling about (include dose and sig)?:     traMADol (ULTRAM) 50 MG tablet       Preferred Pharmacy:   [id number] - [place], [place] - [address] [facility] [address]  [address]  [place] [address]  Phone: [phone] Fax: [phone]      Controlled Substance Agreement on file:   CSA -- Patient Level:     [Media Unavailable] Controlled Substance Agreement - Opioid - Scan on 3/27/2024 11:21 AM   [Media Unavailable] Controlled Substance Agreement - Opioid - Scan on 4/14/2021   [Media Unavailable] Controlled Substance Agreement - Non - Opioid - Scan on 11/27/2019: NON-OPIOID CONTROLLED SUBSTANCE AGREEMENT   [Media Unavailable] Controlled Substance Agreement - Non - Opioid - Scan on 11/25/2020   [Media Unavailable] Controlled Substance Agreement - Opioid - Scan on 11/29/2018       Who prescribed the medication?: PCP    Do you need a refill? Yes    When did you use the medication last? 9.18.24    Patient offered an appointment? No    Do you have any questions or concerns?  No      Could we send this information to you in [facility] or would you prefer to receive a phone call?:   Patient would prefer a phone call   Okay to leave a detailed message?: Yes at Cell number on file:    Telephone Information:   Mobile [phone]   Mobile Not on file.

## 2024-09-26 ENCOUNTER — OFFICE VISIT (OUTPATIENT)
Dept: FAMILY MEDICINE | Facility: CLINIC | Age: 79
End: 2024-09-26
Attending: FAMILY MEDICINE
Payer: COMMERCIAL

## 2024-09-26 VITALS
HEIGHT: 64 IN | BODY MASS INDEX: 23.05 KG/M2 | OXYGEN SATURATION: 96 % | WEIGHT: 135 LBS | RESPIRATION RATE: 14 BRPM | DIASTOLIC BLOOD PRESSURE: 85 MMHG | HEART RATE: 62 BPM | SYSTOLIC BLOOD PRESSURE: 121 MMHG

## 2024-09-26 DIAGNOSIS — N94.819 VULVODYNIA: ICD-10-CM

## 2024-09-26 DIAGNOSIS — F51.04 PSYCHOPHYSIOLOGICAL INSOMNIA: Primary | ICD-10-CM

## 2024-09-26 DIAGNOSIS — F11.90 CHRONIC, CONTINUOUS USE OF OPIOIDS: ICD-10-CM

## 2024-09-26 DIAGNOSIS — F32.A DEPRESSION, UNSPECIFIED DEPRESSION TYPE: ICD-10-CM

## 2024-09-26 DIAGNOSIS — J45.20 MILD INTERMITTENT ASTHMA WITHOUT COMPLICATION: ICD-10-CM

## 2024-09-26 DIAGNOSIS — K52.831 COLLAGENOUS COLITIS: ICD-10-CM

## 2024-09-26 PROBLEM — R19.7 DIARRHEA: Status: ACTIVE | Noted: 2024-09-26

## 2024-09-26 PROCEDURE — 90471 IMMUNIZATION ADMIN: CPT | Performed by: FAMILY MEDICINE

## 2024-09-26 PROCEDURE — 90662 IIV NO PRSV INCREASED AG IM: CPT | Performed by: FAMILY MEDICINE

## 2024-09-26 PROCEDURE — 99214 OFFICE O/P EST MOD 30 MIN: CPT | Mod: 25 | Performed by: FAMILY MEDICINE

## 2024-09-26 PROCEDURE — 91320 SARSCV2 VAC 30MCG TRS-SUC IM: CPT | Performed by: FAMILY MEDICINE

## 2024-09-26 PROCEDURE — 90480 ADMN SARSCOV2 VAC 1/ONLY CMP: CPT | Performed by: FAMILY MEDICINE

## 2024-09-26 RX ORDER — DULOXETIN HYDROCHLORIDE 60 MG/1
60 CAPSULE, DELAYED RELEASE ORAL DAILY
Qty: 90 CAPSULE | Refills: 1 | Status: SHIPPED | OUTPATIENT
Start: 2024-09-26

## 2024-09-26 RX ORDER — TRAZODONE HYDROCHLORIDE 50 MG/1
50 TABLET, FILM COATED ORAL AT BEDTIME
Qty: 90 TABLET | Refills: 3 | Status: SHIPPED | OUTPATIENT
Start: 2024-09-26

## 2024-09-26 ASSESSMENT — ASTHMA QUESTIONNAIRES
QUESTION_1 LAST FOUR WEEKS HOW MUCH OF THE TIME DID YOUR ASTHMA KEEP YOU FROM GETTING AS MUCH DONE AT WORK, SCHOOL OR AT HOME: NONE OF THE TIME
QUESTION_5 LAST FOUR WEEKS HOW WOULD YOU RATE YOUR ASTHMA CONTROL: WELL CONTROLLED
ACT_TOTALSCORE: 21
QUESTION_4 LAST FOUR WEEKS HOW OFTEN HAVE YOU USED YOUR RESCUE INHALER OR NEBULIZER MEDICATION (SUCH AS ALBUTEROL): ONCE A WEEK OR LESS
QUESTION_2 LAST FOUR WEEKS HOW OFTEN HAVE YOU HAD SHORTNESS OF BREATH: THREE TO SIX TIMES A WEEK
ACT_TOTALSCORE: 21
QUESTION_3 LAST FOUR WEEKS HOW OFTEN DID YOUR ASTHMA SYMPTOMS (WHEEZING, COUGHING, SHORTNESS OF BREATH, CHEST TIGHTNESS OR PAIN) WAKE YOU UP AT NIGHT OR EARLIER THAN USUAL IN THE MORNING: NOT AT ALL

## 2024-09-26 ASSESSMENT — PATIENT HEALTH QUESTIONNAIRE - PHQ9: SUM OF ALL RESPONSES TO PHQ QUESTIONS 1-9: 3

## 2024-09-26 NOTE — PROGRESS NOTES
Assessment & Plan     Psychophysiological insomnia  Reviewed some nonpharmacologic ways to be at age sleep as well as medication adjustments.  I did encourage her to reduce the melatonin dose in fact as she is at a very high dose of 10 mg and generally sleep medicine providers have recommended on the lower doses to help better with sleep.  If that does not work she can trial a course of low dose trazodone which has been discussed with her in the past but not yet something that she has tried.  Side effects were reviewed again today.  We did discuss the risk for serotonin syndrome as she is on low-dose tramadol and Cymbalta.  She understands symptoms and signs and will present if any concerns arise.  - traZODone (DESYREL) 50 MG tablet; Take 1 tablet (50 mg) by mouth at bedtime.    Chronic, continuous use of opioids  Vulvodynia  Stable, not yet due for refill;  was reviewed, no concerns.  - Continues traMADol (ULTRAM) 50 MG tablet; TAKE 2 TABLETS BY MOUTH EVERY DAY, MAY TAKE UP TO 6 TABLETS ON TRAVEL DAYS AS 2 TABLETS BY MOUTH EVERY 8 HOURS  - CSA signed/updated 3/27/2024  - UDS normal 3/2024 with presence of tramadol  - PMDP no concerns; she does get these filled at [place] pharmacy (our team has done periodic calls to confirm that they are filled; last sent 9/19 and she indicated having used only about #5 tabs so far on this rx)    Collagenous colitis  Now followed again by GI. Hx of this in 2014; recent colonoscopy done within the past month ([facility] sent as I can only see the [facility] consult note in July, not the scope). Advised her of the game plan to taper off the budesonide per the July note and notify them if worsening in symptoms.      Depression, unspecified depression type  Stable, doing well; duloxetine may be a contributing factor for possible colitis as above but she prefers to hold off for now on changing this Rx   - DULoxetine (CYMBALTA) 60 MG capsule; Take 1 capsule (60 mg) by mouth daily     Mild  intermittent asthma without complication  Doing well       Follow-up 3-6 months    The longitudinal plan of care for the diagnosis(es)/condition(s) as documented were addressed during this visit. Due to the added complexity in care, I will continue to support Demetrice in the subsequent management and with ongoing continuity of care.        Subjective   Demetrice is a 79 year old, presenting for the following health issues:  RECHECK        9/26/2024     8:55 AM   Additional Questions   Roomed by Faviola MUNROE MA     History of Present Illness       Reason for visit:  Checkup    She eats 2-3 servings of fruits and vegetables daily.She consumes 1 sweetened beverage(s) daily.She exercises with enough effort to increase her heart rate 60 or more minutes per day.  She exercises with enough effort to increase her heart rate 7 days per week.   She is taking medications regularly.       She was treated in May with budesonide for collagenous colitis flare (dx in 2014) and responded really well to that.  She was seen with the GI clinic in July to review that and she is now tapered off of it. Felt it was stress/grief induced (no med changes/exposures)  She did have a colonoscopy 3 weeks ago - they removed a polyp   She is taking 2 capsules budesonide a day for now. She didn't realize she should have been tapering (we talked about that July [place] note)    Chronic pain: stable, on tramadol 2 tabs up to 6 in a day for travel- fills at [place] pharmacy   PEG score today is 0  PHQ9 score is 3    Asthma: using the pulmicort as needed (more when she is doing outside/yard work)    She has been having trouble sleeping recently. Ruminating more; hard to turn off the thoughts  Tried CBD gummies but didn't like how she felt  Taking 10mg melatonin at bedtime- we reviewed goal to reduce dose                        Objective    /85 (BP Location: Left arm, Patient Position: Sitting, Cuff Size: Adult Regular)   Pulse 62   Resp 14   Ht 1.626 m  "(5' 4\")   Wt 61.2 kg (135 lb)   SpO2 96%   BMI 23.17 kg/m    Body mass index is 23.17 kg/m .  Physical Exam               Signed Electronically by: Gogo Nam MD    "

## 2024-11-03 ENCOUNTER — MYC REFILL (OUTPATIENT)
Dept: FAMILY MEDICINE | Facility: CLINIC | Age: 79
End: 2024-11-03
Payer: COMMERCIAL

## 2024-11-03 DIAGNOSIS — F11.90 CHRONIC, CONTINUOUS USE OF OPIOIDS: ICD-10-CM

## 2024-11-03 DIAGNOSIS — N94.819 VULVODYNIA: ICD-10-CM

## 2024-11-05 RX ORDER — TRAMADOL HYDROCHLORIDE 50 MG/1
100 TABLET ORAL EVERY 8 HOURS PRN
Qty: 60 TABLET | Refills: 0 | Status: SHIPPED | OUTPATIENT
Start: 2024-11-05

## 2024-12-16 ENCOUNTER — MYC REFILL (OUTPATIENT)
Dept: FAMILY MEDICINE | Facility: CLINIC | Age: 79
End: 2024-12-16
Payer: COMMERCIAL

## 2024-12-16 DIAGNOSIS — F11.90 CHRONIC, CONTINUOUS USE OF OPIOIDS: ICD-10-CM

## 2024-12-16 DIAGNOSIS — N94.819 VULVODYNIA: ICD-10-CM

## 2024-12-16 RX ORDER — TRAMADOL HYDROCHLORIDE 50 MG/1
100 TABLET ORAL EVERY 8 HOURS PRN
Qty: 60 TABLET | Refills: 0 | Status: SHIPPED | OUTPATIENT
Start: 2024-12-16

## 2024-12-17 NOTE — TELEPHONE ENCOUNTER
[place] PMDP expected - no fills as she gets these filled in [place] and we periodically check that PMDP as well

## 2024-12-27 ENCOUNTER — ANCILLARY ORDERS (OUTPATIENT)
Dept: FAMILY MEDICINE | Facility: CLINIC | Age: 79
End: 2024-12-27

## 2024-12-27 DIAGNOSIS — Z12.31 VISIT FOR SCREENING MAMMOGRAM: Primary | ICD-10-CM

## 2024-12-30 ENCOUNTER — HOSPITAL ENCOUNTER (OUTPATIENT)
Dept: MAMMOGRAPHY | Facility: CLINIC | Age: 79
Discharge: HOME | End: 2024-12-30
Attending: FAMILY MEDICINE | Admitting: FAMILY MEDICINE
Payer: COMMERCIAL

## 2024-12-30 DIAGNOSIS — Z12.31 VISIT FOR SCREENING MAMMOGRAM: ICD-10-CM

## 2024-12-30 PROCEDURE — 77067 SCR MAMMO BI INCL CAD: CPT

## 2024-12-30 PROCEDURE — 77063 BREAST TOMOSYNTHESIS BI: CPT

## 2024-12-31 ENCOUNTER — ANCILLARY ORDERS (OUTPATIENT)
Dept: MAMMOGRAPHY | Facility: CLINIC | Age: 79
End: 2024-12-31

## 2024-12-31 DIAGNOSIS — R92.8 ABNORMAL MAMMOGRAM: Primary | ICD-10-CM

## 2025-01-07 ENCOUNTER — HOSPITAL ENCOUNTER (OUTPATIENT)
Dept: MAMMOGRAPHY | Facility: CLINIC | Age: 80
Discharge: HOME | End: 2025-01-07
Attending: FAMILY MEDICINE
Payer: COMMERCIAL

## 2025-01-07 DIAGNOSIS — R92.8 ABNORMAL MAMMOGRAM: ICD-10-CM

## 2025-01-07 PROCEDURE — 76642 ULTRASOUND BREAST LIMITED: CPT | Mod: RT

## 2025-01-07 PROCEDURE — 77065 DX MAMMO INCL CAD UNI: CPT | Mod: RT

## 2025-01-07 PROCEDURE — 77061 BREAST TOMOSYNTHESIS UNI: CPT | Mod: RT

## 2025-01-20 ENCOUNTER — MYC REFILL (OUTPATIENT)
Dept: FAMILY MEDICINE | Facility: CLINIC | Age: 80
End: 2025-01-20
Payer: COMMERCIAL

## 2025-01-20 DIAGNOSIS — N94.819 VULVODYNIA: ICD-10-CM

## 2025-01-20 DIAGNOSIS — F11.90 CHRONIC, CONTINUOUS USE OF OPIOIDS: ICD-10-CM

## 2025-01-22 RX ORDER — TRAMADOL HYDROCHLORIDE 50 MG/1
100 TABLET ORAL EVERY 8 HOURS PRN
Qty: 60 TABLET | Refills: 0 | Status: SHIPPED | OUTPATIENT
Start: 2025-01-22

## 2025-01-22 NOTE — TELEPHONE ENCOUNTER
Staff to please confirm with pharmacy in [place] that she gets this filled (as I anticipate has been the case)- our PMDP in [place] doesn't show easily and hard to log in for [place] now with epic change

## 2025-01-23 NOTE — TELEPHONE ENCOUNTER
Writer communicated with pharmacy. Tramadol was filled for patient and is currently awaiting pickup.    Medina Couch RN

## 2025-02-26 DIAGNOSIS — F11.90 CHRONIC, CONTINUOUS USE OF OPIOIDS: ICD-10-CM

## 2025-02-26 DIAGNOSIS — N94.819 VULVODYNIA: ICD-10-CM

## 2025-02-27 RX ORDER — TRAMADOL HYDROCHLORIDE 50 MG/1
100 TABLET ORAL EVERY 8 HOURS PRN
Qty: 60 TABLET | Refills: 0 | Status: SHIPPED | OUTPATIENT
Start: 2025-02-27

## 2025-03-26 ENCOUNTER — OFFICE VISIT (OUTPATIENT)
Dept: FAMILY MEDICINE | Facility: CLINIC | Age: 80
End: 2025-03-26
Attending: FAMILY MEDICINE
Payer: COMMERCIAL

## 2025-03-26 VITALS
HEART RATE: 72 BPM | DIASTOLIC BLOOD PRESSURE: 72 MMHG | RESPIRATION RATE: 14 BRPM | HEIGHT: 64 IN | OXYGEN SATURATION: 97 % | BODY MASS INDEX: 23.93 KG/M2 | WEIGHT: 140.2 LBS | SYSTOLIC BLOOD PRESSURE: 124 MMHG

## 2025-03-26 DIAGNOSIS — N94.819 VULVODYNIA: ICD-10-CM

## 2025-03-26 DIAGNOSIS — Z51.81 ENCOUNTER FOR THERAPEUTIC DRUG LEVEL MONITORING: ICD-10-CM

## 2025-03-26 DIAGNOSIS — L94.0 CIRCUMSCRIBED SCLERODERMA: ICD-10-CM

## 2025-03-26 DIAGNOSIS — K52.831 COLLAGENOUS COLITIS: ICD-10-CM

## 2025-03-26 DIAGNOSIS — F32.A DEPRESSION, UNSPECIFIED DEPRESSION TYPE: ICD-10-CM

## 2025-03-26 DIAGNOSIS — F11.90 CHRONIC, CONTINUOUS USE OF OPIOIDS: ICD-10-CM

## 2025-03-26 DIAGNOSIS — Z00.00 MEDICARE ANNUAL WELLNESS VISIT, SUBSEQUENT: Primary | ICD-10-CM

## 2025-03-26 DIAGNOSIS — J45.20 MILD INTERMITTENT ASTHMA WITHOUT COMPLICATION: ICD-10-CM

## 2025-03-26 DIAGNOSIS — Z79.899 CONTROLLED SUBSTANCE AGREEMENT SIGNED: ICD-10-CM

## 2025-03-26 DIAGNOSIS — F51.04 PSYCHOPHYSIOLOGICAL INSOMNIA: ICD-10-CM

## 2025-03-26 PROBLEM — D12.5 BENIGN NEOPLASM OF SIGMOID COLON: Status: ACTIVE | Noted: 2024-08-27

## 2025-03-26 LAB
AMPHETAMINES UR QL SCN: NORMAL
BARBITURATES UR QL SCN: NORMAL
BENZODIAZ UR QL SCN: NORMAL
BZE UR QL SCN: NORMAL
CANNABINOIDS UR QL SCN: NORMAL
CHOLEST SERPL-MCNC: 202 MG/DL
CRP SERPL-MCNC: <3 MG/L
ERYTHROCYTE [SEDIMENTATION RATE] IN BLOOD BY WESTERGREN METHOD: 3 MM/HR (ref 0–30)
EST. AVERAGE GLUCOSE BLD GHB EST-MCNC: 103 MG/DL
FASTING STATUS PATIENT QL REPORTED: ABNORMAL
FENTANYL UR QL: NORMAL
HBA1C MFR BLD: 5.2 % (ref 0–5.6)
HDLC SERPL-MCNC: 66 MG/DL
HGB BLD-MCNC: 15.5 G/DL (ref 11.7–15.7)
LDLC SERPL CALC-MCNC: 122 MG/DL
NONHDLC SERPL-MCNC: 136 MG/DL
OPIATES UR QL SCN: NORMAL
PCP QUAL URINE (ROCHE): NORMAL
TRIGL SERPL-MCNC: 70 MG/DL

## 2025-03-26 RX ORDER — TRAZODONE HYDROCHLORIDE 50 MG/1
50 TABLET ORAL AT BEDTIME
Qty: 90 TABLET | Refills: 3 | Status: SHIPPED | OUTPATIENT
Start: 2025-03-26

## 2025-03-26 RX ORDER — TRAMADOL HYDROCHLORIDE 50 MG/1
100 TABLET ORAL DAILY
Qty: 60 TABLET | Refills: 5 | Status: SHIPPED | OUTPATIENT
Start: 2025-03-26

## 2025-03-26 RX ORDER — DULOXETIN HYDROCHLORIDE 60 MG/1
60 CAPSULE, DELAYED RELEASE ORAL DAILY
Qty: 90 CAPSULE | Refills: 1 | Status: SHIPPED | OUTPATIENT
Start: 2025-03-26

## 2025-03-26 RX ORDER — DULOXETIN HYDROCHLORIDE 60 MG/1
60 CAPSULE, DELAYED RELEASE ORAL DAILY
Qty: 90 CAPSULE | Refills: 1 | OUTPATIENT
Start: 2025-03-26

## 2025-03-26 SDOH — HEALTH STABILITY: PHYSICAL HEALTH: ON AVERAGE, HOW MANY MINUTES DO YOU ENGAGE IN EXERCISE AT THIS LEVEL?: 30 MIN

## 2025-03-26 SDOH — HEALTH STABILITY: PHYSICAL HEALTH: ON AVERAGE, HOW MANY DAYS PER WEEK DO YOU ENGAGE IN MODERATE TO STRENUOUS EXERCISE (LIKE A BRISK WALK)?: 6 DAYS

## 2025-03-26 ASSESSMENT — ASTHMA QUESTIONNAIRES
QUESTION_1 LAST FOUR WEEKS HOW MUCH OF THE TIME DID YOUR ASTHMA KEEP YOU FROM GETTING AS MUCH DONE AT WORK, SCHOOL OR AT HOME: A LITTLE OF THE TIME
QUESTION_5 LAST FOUR WEEKS HOW WOULD YOU RATE YOUR ASTHMA CONTROL: WELL CONTROLLED
QUESTION_4 LAST FOUR WEEKS HOW OFTEN HAVE YOU USED YOUR RESCUE INHALER OR NEBULIZER MEDICATION (SUCH AS ALBUTEROL): ONCE A WEEK OR LESS
ACT_TOTALSCORE: 21
QUESTION_2 LAST FOUR WEEKS HOW OFTEN HAVE YOU HAD SHORTNESS OF BREATH: ONCE OR TWICE A WEEK
QUESTION_3 LAST FOUR WEEKS HOW OFTEN DID YOUR ASTHMA SYMPTOMS (WHEEZING, COUGHING, SHORTNESS OF BREATH, CHEST TIGHTNESS OR PAIN) WAKE YOU UP AT NIGHT OR EARLIER THAN USUAL IN THE MORNING: NOT AT ALL

## 2025-03-26 ASSESSMENT — ANXIETY QUESTIONNAIRES
1. FEELING NERVOUS, ANXIOUS, OR ON EDGE: NOT AT ALL
3. WORRYING TOO MUCH ABOUT DIFFERENT THINGS: NOT AT ALL
GAD7 TOTAL SCORE: 2
6. BECOMING EASILY ANNOYED OR IRRITABLE: NOT AT ALL
GAD7 TOTAL SCORE: 2
IF YOU CHECKED OFF ANY PROBLEMS ON THIS QUESTIONNAIRE, HOW DIFFICULT HAVE THESE PROBLEMS MADE IT FOR YOU TO DO YOUR WORK, TAKE CARE OF THINGS AT HOME, OR GET ALONG WITH OTHER PEOPLE: NOT DIFFICULT AT ALL
5. BEING SO RESTLESS THAT IT IS HARD TO SIT STILL: NOT AT ALL
2. NOT BEING ABLE TO STOP OR CONTROL WORRYING: SEVERAL DAYS
7. FEELING AFRAID AS IF SOMETHING AWFUL MIGHT HAPPEN: SEVERAL DAYS

## 2025-03-26 ASSESSMENT — PATIENT HEALTH QUESTIONNAIRE - PHQ9
SUM OF ALL RESPONSES TO PHQ QUESTIONS 1-9: 2
SUM OF ALL RESPONSES TO PHQ QUESTIONS 1-9: 2
10. IF YOU CHECKED OFF ANY PROBLEMS, HOW DIFFICULT HAVE THESE PROBLEMS MADE IT FOR YOU TO DO YOUR WORK, TAKE CARE OF THINGS AT HOME, OR GET ALONG WITH OTHER PEOPLE: NOT DIFFICULT AT ALL
5. POOR APPETITE OR OVEREATING: NOT AT ALL

## 2025-03-26 ASSESSMENT — SOCIAL DETERMINANTS OF HEALTH (SDOH): HOW OFTEN DO YOU GET TOGETHER WITH FRIENDS OR RELATIVES?: TWICE A WEEK

## 2025-03-26 NOTE — PROGRESS NOTES
Preventive Care Visit  [facility]  Gogo Nam MD, Family Medicine  Mar 26, 2025      Assessment & Plan     Medicare annual wellness visit, subsequent  Overall doing well. Encouraged healthy lifestyle as doing. Annual labs today. Will follow up with results.     - declined vision screen    - Home safety information was reviewed if pertinent for falls prevention: regular checkups with vision and hearing, mindful medication use heydi with OTCs, using any assisted walking devices, adequate shoes and feet wear, avoiding loose rugs, safety additions to the home if needed, keeping the body in good shape with regular exercise especially walking, and limiting alcohol intake.  - Social history was reviewed  - Patient is independent with activities of daily living  - We reviewed active symptoms and discussed management  - We reviewed list of healthcare providers for this patient.  - We also reviewed PHQ 9    - Urine Drug Screen  - Hemoglobin A1c  - Hemoglobin  - Lipid panel reflex to direct LDL Non-fasting    Chronic, continuous use of opioids  Controlled. Patient using tramadol 2 tablets (100 mg) once daily in AM for vulvodynia. No recent increase in use. Pain is stable. Controlled substance agreement signed.   - PMDP reviewed; also had RN call her [place] pharmacy since I can't view on [place] PMDP; confirmed fills #60 on 12/16, 1/24, and 2/28. No concerns  - traMADol (ULTRAM) 50 MG tablet; Take 2 tablets (100 mg) by mouth daily. MAY TAKE UP TO 6 TABLETS ON TRAVEL DAYS AS 2 TABLETS BY MOUTH EVERY 8 HOURS  with #5 refills   - Urine Drug Screen  - ok for 3-6 month follow ups given stability    Vulvodynia s/t lichen sclerosis  Controlled on current tramadol regimen; also notes slight improvement. Continue tramadol as described below. May take 6 tablets on travel days as needed. Last travel day was tolerated well without increasing medication dose.  - traMADol (ULTRAM) 50 MG tablet; Take 2 tablets  (100 mg) by mouth daily. MAY TAKE UP TO 6 TABLETS ON TRAVEL DAYS AS 2 TABLETS BY MOUTH EVERY 8 HOURS    Mild intermittent asthma without complication  Controlled. Continue budesonide inhaler and albuterol inhaler as needed. No refills needed at this time.    Psychophysiological insomnia  Controlled.  - traZODone (DESYREL) 50 MG tablet; Take 1 tablet (50 mg) by mouth at bedtime.    Depression, unspecified depression type  Controlled. Continue duloxetine 60 mg once daily.  - DULoxetine (CYMBALTA) 60 MG capsule; Take 1 capsule (60 mg) by mouth daily.    Encounter for therapeutic drug level monitoring.   - Urine Drug Screen    Controlled substance agreement signed 11/25/19- Tramadol 50mg tabs- takes 100mg daily w/ PRN during travel 50mg q6 hr as needed, MELECIO Duval, f/u q6 months  Updated and signed 3/26/2025.    Collagenous colitis  Controlled. Labs today. Will follow up with results.   - Erythrocyte sedimentation rate auto  - CRP, inflammation        Follow-up 6 months     The longitudinal plan of care for the diagnosis(es)/condition(s) as documented were addressed during this visit. Due to the added complexity in care, I will continue to support Demetrice in the subsequent management and with ongoing continuity of care.      Counseling  Appropriate preventive services were addressed with this patient via screening, questionnaire, or discussion as appropriate for fall prevention, nutrition, physical activity, Tobacco-use cessation, social engagement, weight loss and cognition.  Checklist reviewing preventive services available has been given to the patient.  Reviewed patient's diet, addressing concerns and/or questions.   The patient was provided with written information regarding signs of hearing loss.   I have reviewed Opioid Use Disorder and Substance Use Disorder risk factors and made any needed referrals.           Balaji Suresh is a 79 year old, presenting for the following:  Annual Visit (Non  fasting)        3/26/2025    10:13 AM   Additional Questions   Roomed by Faviola MUNROE MA           HPI     No specific concerns today.  Diet: well-balanced  Exercise: treadmill daily for 30mins, outdoor work (20acres) and house work.   Asthma: controlled    Vulvodynia: stable with slight improvement.Taking tramadol 100mg in AM and duloxetine 60 mg HS,     Has been able to wear more underwear ; didn't need extra tramadol on her recent travel to [place]- taking 100mg in AM    Depression: Stable, no concerns    Colitis: Stable, no concerns    61 years !    Wt Readings from Last 3 Encounters:   03/26/25 63.6 kg (140 lb 3.2 oz)   09/26/24 61.2 kg (135 lb)   05/23/24 60.9 kg (134 lb 3.2 oz)     Social History     Social History Narrative    Lives with her - CKD4, CHF.  Her son had an unexpected death 10/8/2019. Was at [facility] for vulvodynia and on chronic pain med- tramadol. 1 son living in [place].  2 grand kids in their upper 20s.  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   Gogo Nam MD                 Advance Care Planning  Patient does not have a Health Care Directive: Discussed advance care planning with patient; information given to patient to review.      3/26/2025   General Health   How would you rate your overall physical health? Good   Feel stress (tense, anxious, or unable to sleep) Only a little   (!) STRESS CONCERN      3/26/2025   Nutrition   Diet: Regular (no restrictions)         3/26/2025   Exercise   Days per week of moderate/strenous exercise 6 days   Average minutes spent exercising at this level 30 min         3/26/2025   Social Factors   Frequency of gathering with friends or relatives Twice a week   Worry food won't last until get money to buy more No   Food not last or not have enough money for food? No   Do you have housing? (Housing is defined as stable permanent housing and does not include staying ouside in a car, in a tent, in an abandoned building, in an overnight shelter, or couch-surfing.)  Yes   Are you worried about losing your housing? No   Lack of transportation? No   Unable to get utilities (heat,electricity)? No         3/26/2025   Fall Risk   Fallen 2 or more times in the past year? No   Trouble with walking or balance? No          3/26/2025   Activities of Daily Living- Home Safety   Needs help with the following daily activites None of the above   Safety concerns in the home None of the above         3/26/2025   Dental   Dentist two times every year? Yes         3/26/2025   Hearing Screening   Hearing concerns? (!) TROUBLE UNDERSTANDING SOFT OR WHISPERED SPEECH.         3/26/2025   Driving Risk Screening   Patient/family members have concerns about driving No         3/26/2025   General Alertness/Fatigue Screening   Have you been more tired than usual lately? No         3/26/2025   Urinary Incontinence Screening   Bothered by leaking urine in past 6 months No           3/27/2024   TB Screening   Were you born outside of the [place]? No         Today's PHQ-9 Score:       3/26/2025    10:04 AM   PHQ-9 SCORE   PHQ-9 Total Score MyChart 2 (Minimal depression)   PHQ-9 Total Score 2        Patient-reported         3/26/2025   Substance Use   Alcohol more than 3/day or more than 7/wk No   Do you have a current opioid prescription? (!) YES   How severe/bad is pain from 1 to 10? 4/10   Do you use any other substances recreationally? No         3/26/2025     1:32 PM   OPIOID RISK TOOL TOTAL SCORE   Total Score 0     Low Risk (0-3)  Moderate Risk (4-7)  High Risk (>8)    GAD7 score: 2        3/27/2024     8:43 AM 9/26/2024     9:00 AM 3/26/2025    10:08 AM   ACT Total Scores   ACT TOTAL SCORE (Goal Greater than or Equal to 20) 20 21 21    In the past 12 months, how many times did you visit the emergency room for your asthma without being admitted to the hospital? 0 0 0   In the past 12 months, how many times were you hospitalized overnight because of your asthma? 0 0 0       Patient-reported       Social  History     Tobacco Use    Smoking status: Former     Passive exposure: Never    Smokeless tobacco: Never   Vaping Use    Vaping status: Never Used   Substance Use Topics    Alcohol use: No    Drug use: No           12/30/2024   LAST FHS-7 RESULTS   1st degree relative breast or ovarian cancer No   Any relative bilateral breast cancer No   Any male have breast cancer No   Any ONE woman have BOTH breast AND ovarian cancer No   Any woman with breast cancer before 50yrs No   2 or more relatives with breast AND/OR ovarian cancer No   2 or more relatives with breast AND/OR bowel cancer No            ASCVD Risk   The 10-year ASCVD risk score (Amrit STARKS, et al., 2019) is: 23.5%    Values used to calculate the score:      Age: 79 years      Sex: Female      Is Non- : No      Diabetic: No      Tobacco smoker: No      Systolic Blood Pressure: 124 mmHg      Is BP treated: No      HDL Cholesterol: 66 mg/dL      Total Cholesterol: 204 mg/dL            Reviewed and updated as needed this visit by Provider   Tobacco  Allergies  Meds  Problems  Med Hx  Surg Hx  Fam Hx              Current providers sharing in care for this patient include:  Patient Care Team:  Gogo Nam MD as PCP - General  Gogo Nam MD as Assigned PCP  Gogo Nam MD as Assigned Pain Medication Provider    The following health maintenance items are reviewed in Epic and correct as of today:  Health Maintenance   Topic Date Due    RSV VACCINE (1 - 1-dose 75+ series) Never done    ASTHMA ACTION PLAN  04/14/2022    DTAP/TDAP/TD IMMUNIZATION (2 - Td or Tdap) 10/12/2022    COVID-19 Vaccine (7 - 2024-25 season) 03/26/2025    MEDICARE ANNUAL WELLNESS VISIT  03/27/2025    JOSÉ LUIS ASSESSMENT  03/27/2025    URINE DRUG SCREEN  03/27/2025    CONTROLLED SUBSTANCE AGREEMENT FOR CHRONIC PAIN MANAGEMENT  03/27/2025    ANNUAL REVIEW OF HM ORDERS  09/26/2025    ASTHMA CONTROL TEST  09/26/2025    FALL  "RISK ASSESSMENT  03/26/2026    PHQ-9  03/26/2026    DIABETES SCREENING  05/14/2027    LIPID  03/27/2029    ADVANCE CARE PLANNING  03/27/2029    DEXA  04/26/2036    INFLUENZA VACCINE  Completed    Pneumococcal Vaccine: 50+ Years  Completed    ZOSTER IMMUNIZATION  Completed    DEPRESSION ACTION PLAN  Addressed    HPV IMMUNIZATION  Aged Out    MENINGITIS IMMUNIZATION  Aged Out    HEPATITIS C SCREENING  Discontinued    MAMMO SCREENING  Discontinued    COLORECTAL CANCER SCREENING  Discontinued    LUNG CANCER SCREENING  Discontinued         Review of Systems  Constitutional, HEENT, cardiovascular, pulmonary, gi and gu systems are negative, except as otherwise noted.     Objective    Exam  /72 (BP Location: Left arm, Patient Position: Sitting, Cuff Size: Adult Regular)   Pulse 72   Resp 14   Ht 1.626 m (5' 4\")   Wt 63.6 kg (140 lb 3.2 oz)   SpO2 97%   BMI 24.07 kg/m     Estimated body mass index is 24.07 kg/m  as calculated from the following:    Height as of this encounter: 1.626 m (5' 4\").    Weight as of this encounter: 63.6 kg (140 lb 3.2 oz).    Physical Exam  GENERAL: alert and no distress  EYES: Eyes grossly normal to inspection, PERRL and conjunctivae and sclerae normal  HENT: ear canals and TM's normal, nose and mouth without ulcers or lesions  NECK: no adenopathy, no asymmetry, masses, or scars  RESP: lungs clear to auscultation - no rales, rhonchi or wheezes  CV: regular rate and rhythm, normal S1 S2, no S3 or S4, no murmur, click or rub, no peripheral edema  ABDOMEN: soft, nontender, no hepatosplenomegaly, no masses and bowel sounds normal  MS: no gross musculoskeletal defects noted, no edema  SKIN: no suspicious lesions or rashes  NEURO: Normal strength and tone, mentation intact and speech normal  PSYCH: mentation appears normal, affect normal/bright        3/26/2025   Mini Cog   Clock Draw Score 2 Normal   3 Item Recall 3 objects recalled   Mini Cog Total Score 5              Signed " Electronically by: Gogo Nam MD    Answers submitted by the patient for this visit:  Patient Health Questionnaire (Submitted on 3/26/2025)  If you checked off any problems, how difficult have these problems made it for you to do your work, take care of things at home, or get along with other people?: Not difficult at all  PHQ9 TOTAL SCORE: 2

## 2025-03-26 NOTE — LETTER
Opioid / Opioid Plus Controlled Substance Agreement  Tramadol 50mg   Visits every 3-6 months (evisit/video/physicals)    Take 2 tablets (100 mg) by mouth every 8 hours as needed for severe pain. TAKE 2 TABLETS BY MOUTH EVERY DAY, MAY TAKE UP TO 6 TABLETS ON TRAVEL DAYS AS 2 TABLETS BY MOUTH EVERY 8 HOURS       This is an agreement between you and your provider about the safe and appropriate use of controlled substance/opioids prescribed by your care team. Controlled substances are medicines that can cause physical and mental dependence (abuse).    There are strict laws about having and using these medicines. We here at [facility] are committing to working with you in your efforts to get better. To support you in this work, we ll help you schedule regular office appointments for medicine refills. If we must cancel or change your appointment for any reason, we ll make sure you have enough medicine to last until your next appointment.     As a Provider, I will:  Listen carefully to your concerns and treat you with respect.   Recommend a treatment plan that I believe is in your best interest. This plan may involve therapies other than opioid pain medication.   Talk with you often about the possible benefits, and the risk of harm of any medicine that we prescribe for you.   Provide a plan on how to taper (discontinue or go off) using this medicine if the decision is made to stop its use.    As a Patient, I understand that opioid(s):   Are a controlled substance prescribed by my care team to help me function or work and manage my condition(s).   Are strong medicines and can cause serious side effects such as:  Drowsiness, which can seriously affect my driving ability  A lower breathing rate, enough to cause death  Harm to my thinking ability   Depression   Abuse of and addiction to this medicine  Need to be taken exactly as prescribed. Combining opioids with certain medicines or chemicals (such as illegal drugs,  sedatives, sleeping pills, and benzodiazepines) can be dangerous or even fatal. If I stop opioids suddenly, I may have severe withdrawal symptoms.  Do not work for all types of pain nor for all patients. If they re not helpful, I may be asked to stop them.        The risks, benefits and side effects of these medicine(s) were explained to me. I agree that:  I will take part in other treatments as advised by my care team. This may be psychiatry or counseling, physical therapy, behavioral therapy, group treatment or a referral to a specialist.     I will keep all my appointments. I understand that this is part of the monitoring of opioids. My care team may require an office visit for EVERY opioid/controlled substance refill. If I miss appointments or don t follow instructions, my care team may stop my medicine.    I will take my medicines as prescribed. I will not change the dose or schedule unless my care team tells me to. There will be no refills if I run out early.     I may be asked to come to the clinic and complete a urine drug test or complete a pill count at any time. If I don t give a urine sample or participate in a pill count, the care team may stop my medicine.    I will only receive prescriptions from this clinic for chronic pain. If I am treated by another provider for acute pain issues, I will tell them that I am taking opioid pain medication for chronic pain and that I have a treatment agreement with this provider. I will inform my [facility] care team within one business day if I am given a prescription for any pain medication by another healthcare provider. My [facility] care team can contact other providers and pharmacists about my use of any medicines.    It is up to me to make sure that I don t run out of my medicines on weekends or holidays. If my care team is willing to refill my opioid prescription without a visit, I must request refills only during office hours. Refills may take  up to 3 business days to process. I will use one pharmacy to fill all my opioid and other controlled substance prescriptions. I will notify the clinic about any changes to my insurance or medication availability.    I am responsible for my prescriptions. If the medicine/prescription is lost, stolen or destroyed, it will not be replaced. I also agree not to share controlled substance medicines with anyone.    I am aware I should not use any illegal or recreational drugs. I agree not to drink alcohol unless my care team says I can.       If I enroll in the [place] [facility] Cannabis program, I will tell my care team prior to my next refill.     I will tell my care team right away if I become pregnant, have a new medical problem treated outside of my regular clinic, or have a change in my medications.    I understand that this medicine can affect my thinking, judgment and reaction time. Alcohol and drugs affect the brain and body, which can affect the safety of my driving. Being under the influence of alcohol or drugs can affect my decision-making, behaviors, personal safety, and the safety of others. Driving while impaired (DWI) can occur if a person is driving, operating, or in physical control of a car, motorcycle, boat, snowmobile, ATV, motorbike, off-road vehicle, or any other motor vehicle ([place] Statute 169A.20). I understand the risk if I choose to drive or operate any vehicle or machinery.    I understand that if I do not follow any of the conditions above, my prescriptions or treatment may be stopped or changed.    I agree that my provider, clinic care team, and pharmacy may work with any city, state or federal law enforcement agency that investigates the misuse, sale, or other diversion of my controlled medicine. I will allow my provider to discuss my care with, or share a copy of, this agreement with any other treating provider, pharmacy or emergency room where I receive care.    I have read this agreement and  have asked questions about anything I did not understand.    _______________________________________________________  Patient Signature - Demetrice J Garcia _____________________                   Date     _______________________________________________________  Provider Signature - Gogo Halle Viv, MD   _____________________                   Date     _______________________________________________________  Witness Signature (required if provider not present while patient signing)   _____________________                   Date

## 2025-04-09 ENCOUNTER — MYC MEDICAL ADVICE (OUTPATIENT)
Dept: FAMILY MEDICINE | Facility: CLINIC | Age: 80
End: 2025-04-09
Payer: COMMERCIAL

## 2025-05-06 ENCOUNTER — MYC REFILL (OUTPATIENT)
Dept: FAMILY MEDICINE | Facility: CLINIC | Age: 80
End: 2025-05-06
Payer: COMMERCIAL

## 2025-05-06 DIAGNOSIS — F11.90 CHRONIC, CONTINUOUS USE OF OPIOIDS: ICD-10-CM

## 2025-05-06 DIAGNOSIS — N94.819 VULVODYNIA: ICD-10-CM

## 2025-05-06 RX ORDER — TRAMADOL HYDROCHLORIDE 50 MG/1
100 TABLET ORAL DAILY
Qty: 60 TABLET | Refills: 0 | Status: SHIPPED | OUTPATIENT
Start: 2025-05-06

## 2025-06-16 ENCOUNTER — MYC REFILL (OUTPATIENT)
Dept: FAMILY MEDICINE | Facility: CLINIC | Age: 80
End: 2025-06-16
Payer: COMMERCIAL

## 2025-06-16 DIAGNOSIS — N94.819 VULVODYNIA: ICD-10-CM

## 2025-06-16 DIAGNOSIS — F11.90 CHRONIC, CONTINUOUS USE OF OPIOIDS: ICD-10-CM

## 2025-06-16 RX ORDER — TRAMADOL HYDROCHLORIDE 50 MG/1
100 TABLET ORAL DAILY
Qty: 60 TABLET | Refills: 0 | Status: SHIPPED | OUTPATIENT
Start: 2025-06-16

## 2025-07-14 ENCOUNTER — MYC REFILL (OUTPATIENT)
Dept: FAMILY MEDICINE | Facility: CLINIC | Age: 80
End: 2025-07-14
Payer: COMMERCIAL

## 2025-07-14 DIAGNOSIS — F11.90 CHRONIC, CONTINUOUS USE OF OPIOIDS: ICD-10-CM

## 2025-07-14 DIAGNOSIS — K52.831 COLLAGENOUS COLITIS: ICD-10-CM

## 2025-07-14 DIAGNOSIS — N94.819 VULVODYNIA: ICD-10-CM

## 2025-07-14 RX ORDER — TRAMADOL HYDROCHLORIDE 50 MG/1
100 TABLET ORAL DAILY
Qty: 60 TABLET | Refills: 0 | Status: SHIPPED | OUTPATIENT
Start: 2025-07-14

## 2025-07-14 RX ORDER — BUDESONIDE 3 MG/1
9 CAPSULE, COATED PELLETS ORAL EVERY MORNING
Qty: 90 CAPSULE | Refills: 1 | Status: SHIPPED | OUTPATIENT
Start: 2025-07-14

## 2025-07-15 NOTE — TELEPHONE ENCOUNTER
Contacts       Contact Date/Time Type Contact Phone/Fax    07/14/2025 09:28 AM CDT Web (Incoming) Demetrice Garcia (Self)           Attempted to reach patient to: Schedule an appointment    When patient returns call, please take this action: Assist with scheduling    Reason for the visit: Chronic Disease Management/Medication/Follow-up    When to schedule: October    Additional comments/info: 6 month Med check    If unable to schedule: Add a note to the telephone encounter noting the barrier and route back to primary care team pool

## 2025-08-19 ENCOUNTER — MYC REFILL (OUTPATIENT)
Dept: FAMILY MEDICINE | Facility: CLINIC | Age: 80
End: 2025-08-19
Payer: COMMERCIAL

## 2025-08-19 DIAGNOSIS — N94.819 VULVODYNIA: ICD-10-CM

## 2025-08-19 DIAGNOSIS — F11.90 CHRONIC, CONTINUOUS USE OF OPIOIDS: ICD-10-CM

## 2025-08-19 RX ORDER — TRAMADOL HYDROCHLORIDE 50 MG/1
100 TABLET ORAL DAILY
Qty: 60 TABLET | Refills: 0 | Status: SHIPPED | OUTPATIENT
Start: 2025-08-19

## 2025-09-04 ENCOUNTER — OFFICE VISIT (OUTPATIENT)
Dept: FAMILY MEDICINE | Facility: CLINIC | Age: 80
End: 2025-09-04
Payer: COMMERCIAL

## 2025-09-04 VITALS
WEIGHT: 141.3 LBS | SYSTOLIC BLOOD PRESSURE: 125 MMHG | OXYGEN SATURATION: 96 % | RESPIRATION RATE: 14 BRPM | HEIGHT: 64 IN | HEART RATE: 94 BPM | DIASTOLIC BLOOD PRESSURE: 86 MMHG | BODY MASS INDEX: 24.12 KG/M2

## 2025-09-04 DIAGNOSIS — N94.819 VULVODYNIA: ICD-10-CM

## 2025-09-04 DIAGNOSIS — F11.90 CHRONIC, CONTINUOUS USE OF OPIOIDS: ICD-10-CM

## 2025-09-04 RX ORDER — TRAMADOL HYDROCHLORIDE 50 MG/1
100 TABLET ORAL DAILY
Qty: 60 TABLET | Refills: 5 | Status: SHIPPED | OUTPATIENT
Start: 2025-09-04

## 2025-09-04 RX ORDER — MELOXICAM 15 MG/1
15 TABLET ORAL DAILY
COMMUNITY
Start: 2025-06-03

## 2025-09-04 ASSESSMENT — ANXIETY QUESTIONNAIRES
7. FEELING AFRAID AS IF SOMETHING AWFUL MIGHT HAPPEN: NOT AT ALL
GAD7 TOTAL SCORE: 0
5. BEING SO RESTLESS THAT IT IS HARD TO SIT STILL: NOT AT ALL
2. NOT BEING ABLE TO STOP OR CONTROL WORRYING: NOT AT ALL
GAD7 TOTAL SCORE: 0
6. BECOMING EASILY ANNOYED OR IRRITABLE: NOT AT ALL
1. FEELING NERVOUS, ANXIOUS, OR ON EDGE: NOT AT ALL
3. WORRYING TOO MUCH ABOUT DIFFERENT THINGS: NOT AT ALL

## 2025-09-04 ASSESSMENT — PATIENT HEALTH QUESTIONNAIRE - PHQ9
SUM OF ALL RESPONSES TO PHQ QUESTIONS 1-9: 6
5. POOR APPETITE OR OVEREATING: NOT AT ALL